# Patient Record
Sex: FEMALE | Race: WHITE | NOT HISPANIC OR LATINO | Employment: FULL TIME | URBAN - NONMETROPOLITAN AREA
[De-identification: names, ages, dates, MRNs, and addresses within clinical notes are randomized per-mention and may not be internally consistent; named-entity substitution may affect disease eponyms.]

---

## 2017-05-13 ENCOUNTER — APPOINTMENT (OUTPATIENT)
Dept: GENERAL RADIOLOGY | Facility: HOSPITAL | Age: 42
End: 2017-05-13

## 2017-05-13 ENCOUNTER — HOSPITAL ENCOUNTER (EMERGENCY)
Facility: HOSPITAL | Age: 42
Discharge: HOME | End: 2017-05-13
Attending: EMERGENCY MEDICINE | Admitting: EMERGENCY MEDICINE

## 2017-05-13 VITALS
HEIGHT: 66 IN | HEART RATE: 88 BPM | SYSTOLIC BLOOD PRESSURE: 129 MMHG | BODY MASS INDEX: 43.39 KG/M2 | RESPIRATION RATE: 16 BRPM | TEMPERATURE: 98.1 F | OXYGEN SATURATION: 99 % | DIASTOLIC BLOOD PRESSURE: 99 MMHG | WEIGHT: 270 LBS

## 2017-05-13 DIAGNOSIS — S16.1XXA CERVICAL STRAIN, INITIAL ENCOUNTER: Primary | ICD-10-CM

## 2017-05-13 DIAGNOSIS — S39.012A LUMBAR STRAIN, INITIAL ENCOUNTER: ICD-10-CM
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
## 2017-05-13 LAB — B-HCG UR QL: NEGATIVE

## 2017-05-13 PROCEDURE — 96372 THER/PROPH/DIAG INJ SC/IM: CPT

## 2017-05-13 PROCEDURE — 72050 X-RAY EXAM NECK SPINE 4/5VWS: CPT | Performed by: RADIOLOGY

## 2017-05-13 PROCEDURE — 72110 X-RAY EXAM L-2 SPINE 4/>VWS: CPT

## 2017-05-13 PROCEDURE — 99283 EMERGENCY DEPT VISIT LOW MDM: CPT

## 2017-05-13 PROCEDURE — 72110 X-RAY EXAM L-2 SPINE 4/>VWS: CPT | Performed by: RADIOLOGY

## 2017-05-13 PROCEDURE — 25010000002 HYDROMORPHONE PER 4 MG: Performed by: EMERGENCY MEDICINE

## 2017-05-13 PROCEDURE — 72050 X-RAY EXAM NECK SPINE 4/5VWS: CPT

## 2017-05-13 PROCEDURE — 81025 URINE PREGNANCY TEST: CPT | Performed by: PHYSICIAN ASSISTANT

## 2017-05-13 PROCEDURE — 25010000002 METHYLPREDNISOLONE PER 125 MG: Performed by: EMERGENCY MEDICINE

## 2017-05-13 RX ORDER — HYDROCODONE BITARTRATE AND ACETAMINOPHEN 7.5; 325 MG/1; MG/1
1 TABLET ORAL ONCE
Status: COMPLETED | OUTPATIENT
Start: 2017-05-13 | End: 2017-05-13

## 2017-05-13 RX ORDER — CYCLOBENZAPRINE HCL 10 MG
10 TABLET ORAL 3 TIMES DAILY PRN
Qty: 20 TABLET | Refills: 0 | OUTPATIENT
Start: 2017-05-13 | End: 2022-09-01

## 2017-05-13 RX ORDER — CYCLOBENZAPRINE HCL 10 MG
10 TABLET ORAL ONCE
Status: COMPLETED | OUTPATIENT
Start: 2017-05-13 | End: 2017-05-13

## 2017-05-13 RX ORDER — METHYLPREDNISOLONE SODIUM SUCCINATE 125 MG/2ML
125 INJECTION, POWDER, LYOPHILIZED, FOR SOLUTION INTRAMUSCULAR; INTRAVENOUS ONCE
Status: COMPLETED | OUTPATIENT
Start: 2017-05-13 | End: 2017-05-13

## 2017-05-13 RX ORDER — ONDANSETRON 4 MG/1
4 TABLET, ORALLY DISINTEGRATING ORAL ONCE
Status: COMPLETED | OUTPATIENT
Start: 2017-05-13 | End: 2017-05-13

## 2017-05-13 RX ADMIN — CYCLOBENZAPRINE HYDROCHLORIDE 10 MG: 10 TABLET, FILM COATED ORAL at 10:42

## 2017-05-13 RX ADMIN — HYDROCODONE BITARTRATE AND ACETAMINOPHEN 1 TABLET: 7.5; 325 TABLET ORAL at 10:42

## 2017-05-13 RX ADMIN — HYDROMORPHONE HYDROCHLORIDE 1 MG: 1 INJECTION, SOLUTION INTRAMUSCULAR; INTRAVENOUS; SUBCUTANEOUS at 12:07

## 2017-05-13 RX ADMIN — METHYLPREDNISOLONE SODIUM SUCCINATE 125 MG: 125 INJECTION, POWDER, FOR SOLUTION INTRAMUSCULAR; INTRAVENOUS at 10:41

## 2017-05-13 RX ADMIN — ONDANSETRON 4 MG: 4 TABLET, ORALLY DISINTEGRATING ORAL at 12:07

## 2017-06-19 ENCOUNTER — APPOINTMENT (OUTPATIENT)
Dept: GENERAL RADIOLOGY | Facility: HOSPITAL | Age: 42
End: 2017-06-19

## 2017-06-19 ENCOUNTER — HOSPITAL ENCOUNTER (EMERGENCY)
Facility: HOSPITAL | Age: 42
Discharge: HOME | End: 2017-06-19
Attending: EMERGENCY MEDICINE | Admitting: EMERGENCY MEDICINE

## 2017-06-19 VITALS
TEMPERATURE: 98.9 F | RESPIRATION RATE: 16 BRPM | DIASTOLIC BLOOD PRESSURE: 79 MMHG | OXYGEN SATURATION: 96 % | BODY MASS INDEX: 41.78 KG/M2 | SYSTOLIC BLOOD PRESSURE: 139 MMHG | HEIGHT: 66 IN | HEART RATE: 95 BPM | WEIGHT: 260 LBS

## 2017-06-19 DIAGNOSIS — S50.01XA CONTUSION OF RIGHT ELBOW, INITIAL ENCOUNTER: Primary | ICD-10-CM

## 2017-06-19 DIAGNOSIS — S30.1XXA HIP POINTER, INITIAL ENCOUNTER: ICD-10-CM

## 2017-06-19 LAB
B-HCG UR QL: NEGATIVE
BACTERIA UR QL AUTO: ABNORMAL /HPF
BILIRUB UR QL STRIP: NEGATIVE
CLARITY UR: ABNORMAL
COLOR UR: YELLOW
GLUCOSE UR STRIP-MCNC: NEGATIVE MG/DL
HGB UR QL STRIP.AUTO: ABNORMAL
HYALINE CASTS UR QL AUTO: ABNORMAL /LPF
KETONES UR QL STRIP: NEGATIVE
LEUKOCYTE ESTERASE UR QL STRIP.AUTO: NEGATIVE
NITRITE UR QL STRIP: NEGATIVE
PH UR STRIP.AUTO: 8 [PH] (ref 5–8)
PROT UR QL STRIP: ABNORMAL
RBC # UR: ABNORMAL /HPF
REF LAB TEST METHOD: ABNORMAL
SP GR UR STRIP: 1.01 (ref 1–1.03)
SQUAMOUS #/AREA URNS HPF: ABNORMAL /HPF
UROBILINOGEN UR QL STRIP: ABNORMAL
WBC UR QL AUTO: ABNORMAL /HPF

## 2017-06-19 PROCEDURE — 25010000002 KETOROLAC TROMETHAMINE PER 15 MG: Performed by: PHYSICIAN ASSISTANT

## 2017-06-19 PROCEDURE — 73502 X-RAY EXAM HIP UNI 2-3 VIEWS: CPT | Performed by: RADIOLOGY

## 2017-06-19 PROCEDURE — 81025 URINE PREGNANCY TEST: CPT | Performed by: EMERGENCY MEDICINE

## 2017-06-19 PROCEDURE — 96372 THER/PROPH/DIAG INJ SC/IM: CPT

## 2017-06-19 PROCEDURE — 73502 X-RAY EXAM HIP UNI 2-3 VIEWS: CPT

## 2017-06-19 PROCEDURE — 73080 X-RAY EXAM OF ELBOW: CPT | Performed by: RADIOLOGY

## 2017-06-19 PROCEDURE — 81001 URINALYSIS AUTO W/SCOPE: CPT | Performed by: EMERGENCY MEDICINE

## 2017-06-19 PROCEDURE — 99284 EMERGENCY DEPT VISIT MOD MDM: CPT

## 2017-06-19 PROCEDURE — 73080 X-RAY EXAM OF ELBOW: CPT

## 2017-06-19 RX ORDER — HYDROCODONE BITARTRATE AND ACETAMINOPHEN 7.5; 325 MG/1; MG/1
1 TABLET ORAL ONCE
Status: COMPLETED | OUTPATIENT
Start: 2017-06-19 | End: 2017-06-19

## 2017-06-19 RX ORDER — IBUPROFEN 800 MG/1
800 TABLET ORAL EVERY 8 HOURS PRN
Qty: 30 TABLET | Refills: 0 | Status: SHIPPED | OUTPATIENT
Start: 2017-06-19 | End: 2017-06-19 | Stop reason: HOSPADM

## 2017-06-19 RX ORDER — KETOROLAC TROMETHAMINE 30 MG/ML
60 INJECTION, SOLUTION INTRAMUSCULAR; INTRAVENOUS ONCE
Status: COMPLETED | OUTPATIENT
Start: 2017-06-19 | End: 2017-06-19

## 2017-06-19 RX ORDER — LISINOPRIL 2.5 MG/1
2.5 TABLET ORAL DAILY
COMMUNITY

## 2017-06-19 RX ADMIN — HYDROCODONE BITARTRATE AND ACETAMINOPHEN 1 TABLET: 7.5; 325 TABLET ORAL at 19:01

## 2017-06-19 RX ADMIN — KETOROLAC TROMETHAMINE 60 MG: 60 INJECTION, SOLUTION INTRAMUSCULAR at 20:30

## 2017-06-20 NOTE — ED PROVIDER NOTES
Subjective   Patient is a 41 y.o. female presenting with upper extremity pain and lower extremity pain.   History provided by:  Patient  Upper Extremity Issue   Location:  Elbow  Elbow location:  R elbow  Injury: yes    Time since incident:  1 hour  Mechanism of injury: fall    Fall:     Fall occurred:  Down stairs    Impact surface:  Hard floor    Entrapped after fall: no    Pain details:     Quality:  Aching and throbbing    Radiates to:  Does not radiate    Severity:  Moderate    Onset quality:  Sudden    Timing:  Constant    Progression:  Worsening  Dislocation: no    Relieved by:  Nothing  Ineffective treatments:  None tried  Associated symptoms: decreased range of motion and swelling    Associated symptoms: no fever    Lower Extremity Issue   Location:  Hip  Time since incident:  1 hour  Injury: yes    Mechanism of injury: fall    Fall:     Fall occurred:  Down stairs    Impact surface:  Hard floor    Entrapped after fall: no    Hip location:  R hip  Pain details:     Quality:  Aching and throbbing    Severity:  Moderate    Onset quality:  Sudden    Timing:  Constant    Progression:  Worsening  Relieved by:  Nothing  Ineffective treatments:  None tried  Associated symptoms: decreased ROM and swelling    Associated symptoms: no fever        Review of Systems   Constitutional: Negative.  Negative for fever.   HENT: Negative.    Respiratory: Negative.    Cardiovascular: Negative.  Negative for chest pain.   Gastrointestinal: Negative.  Negative for abdominal pain.   Endocrine: Negative.    Genitourinary: Negative.  Negative for dysuria.   Skin: Negative.    Neurological: Negative.    Psychiatric/Behavioral: Negative.    All other systems reviewed and are negative.      Past Medical History:   Diagnosis Date   • Hypertension        Allergies   Allergen Reactions   • Morphine And Related        History reviewed. No pertinent surgical history.    Family History   Problem Relation Age of Onset   • Family history  unknown: Yes       Social History     Social History   • Marital status: Single     Spouse name: N/A   • Number of children: N/A   • Years of education: N/A     Social History Main Topics   • Smoking status: Never Smoker   • Smokeless tobacco: None   • Alcohol use No   • Drug use: No   • Sexual activity: Defer     Other Topics Concern   • None     Social History Narrative   • None           Objective   Physical Exam   Constitutional: She is oriented to person, place, and time. She appears well-developed and well-nourished. No distress.   HENT:   Head: Normocephalic and atraumatic.   Nose: Nose normal.   Eyes: Conjunctivae and EOM are normal. Pupils are equal, round, and reactive to light.   Neck: Normal range of motion. Neck supple. No JVD present. No tracheal deviation present.   Cardiovascular: Normal rate, regular rhythm and normal heart sounds.    No murmur heard.  Pulmonary/Chest: Effort normal and breath sounds normal. No respiratory distress. She has no wheezes.   Abdominal: Soft. Bowel sounds are normal. There is no tenderness.   Musculoskeletal: She exhibits edema and tenderness. She exhibits no deformity.   Not able to fully extend right upper extremity.  Swelling noted.  Abrasions noted to RUE.  Tenderness to palpation of lateral right hip.    Neurological: She is alert and oriented to person, place, and time. No cranial nerve deficit.   Skin: Skin is warm and dry. No rash noted. She is not diaphoretic. No erythema. No pallor.   Abrasions noted to RUE.   Psychiatric: She has a normal mood and affect. Her behavior is normal. Thought content normal.   Nursing note and vitals reviewed.      Procedures         ED Course  ED Course   Comment By Time   XR elbow reviewed by Dr. Juárez:  Negative for acute bony abnormality.  Significant hematoma noted. TATUM Cooper 06/19 2050   XR right hip reviewed by Dr. Juárez:   No apparent bony abnormality. TATUM Cooper 06/19 2051                  [facility]  Number  of Diagnoses or Management Options  Contusion of right elbow, initial encounter: new and requires workup  Hip pointer, initial encounter: new and requires workup     Amount and/or Complexity of Data Reviewed  Clinical lab tests: ordered and reviewed  Tests in the radiology section of CPT®: ordered and reviewed    Risk of Complications, Morbidity, and/or Mortality  Presenting problems: low  Diagnostic procedures: low  Management options: low    Patient Progress  Patient progress: stable      Final diagnoses:   Contusion of right elbow, initial encounter   Hip pointer, initial encounter            TATUM Cooper  06/19/17 [id number]

## 2022-02-16 ENCOUNTER — APPOINTMENT (OUTPATIENT)
Dept: MAMMOGRAPHY | Facility: HOSPITAL | Age: 47
End: 2022-02-16

## 2022-08-30 ENCOUNTER — HOSPITAL ENCOUNTER (OUTPATIENT)
Dept: ULTRASOUND IMAGING | Facility: HOSPITAL | Age: 47
Discharge: HOME | End: 2022-08-30

## 2022-08-30 ENCOUNTER — HOSPITAL ENCOUNTER (OUTPATIENT)
Dept: MAMMOGRAPHY | Facility: HOSPITAL | Age: 47
Discharge: HOME | End: 2022-08-30

## 2022-08-30 DIAGNOSIS — N63.0 BREAST NODULE: ICD-10-CM

## 2022-08-30 PROCEDURE — 76642 ULTRASOUND BREAST LIMITED: CPT

## 2022-08-30 PROCEDURE — 77066 DX MAMMO INCL CAD BI: CPT

## 2022-08-30 PROCEDURE — G0279 TOMOSYNTHESIS, MAMMO: HCPCS

## 2022-08-30 PROCEDURE — 76642 ULTRASOUND BREAST LIMITED: CPT | Performed by: RADIOLOGY

## 2022-08-30 PROCEDURE — 77062 BREAST TOMOSYNTHESIS BI: CPT | Performed by: RADIOLOGY

## 2022-08-30 PROCEDURE — 77066 DX MAMMO INCL CAD BI: CPT | Performed by: RADIOLOGY

## 2022-09-07 ENCOUNTER — APPOINTMENT (OUTPATIENT)
Dept: ULTRASOUND IMAGING | Facility: HOSPITAL | Age: 47
End: 2022-09-07

## 2022-09-07 ENCOUNTER — APPOINTMENT (OUTPATIENT)
Dept: MAMMOGRAPHY | Facility: HOSPITAL | Age: 47
End: 2022-09-07

## 2022-09-15 ENCOUNTER — APPOINTMENT (OUTPATIENT)
Dept: MAMMOGRAPHY | Facility: HOSPITAL | Age: 47
End: 2022-09-15

## 2022-12-20 ENCOUNTER — HOSPITAL ENCOUNTER (OUTPATIENT)
Dept: ULTRASOUND IMAGING | Facility: HOSPITAL | Age: 47
Discharge: HOME | End: 2022-12-20
Admitting: RADIOLOGY

## 2022-12-20 DIAGNOSIS — R92.8 ABNORMAL MAMMOGRAM: ICD-10-CM

## 2022-12-20 PROCEDURE — 76642 ULTRASOUND BREAST LIMITED: CPT

## 2022-12-20 PROCEDURE — 76642 ULTRASOUND BREAST LIMITED: CPT | Performed by: RADIOLOGY

## 2024-10-17 ENCOUNTER — OFFICE VISIT (OUTPATIENT)
Dept: FAMILY MEDICINE CLINIC | Age: 49
End: 2024-10-17
Payer: COMMERCIAL

## 2024-10-17 VITALS
RESPIRATION RATE: 18 BRPM | HEART RATE: 83 BPM | WEIGHT: 281 LBS | HEIGHT: 66 IN | DIASTOLIC BLOOD PRESSURE: 84 MMHG | SYSTOLIC BLOOD PRESSURE: 150 MMHG | BODY MASS INDEX: 45.16 KG/M2

## 2024-10-17 DIAGNOSIS — E03.8 OTHER SPECIFIED HYPOTHYROIDISM: ICD-10-CM

## 2024-10-17 DIAGNOSIS — E66.01 MORBID OBESITY WITH BODY MASS INDEX (BMI) OF 45.0 TO 49.9 IN ADULT: Primary | ICD-10-CM

## 2024-10-17 DIAGNOSIS — I10 PRIMARY HYPERTENSION: ICD-10-CM

## 2024-10-17 DIAGNOSIS — R53.83 FATIGUE, UNSPECIFIED TYPE: ICD-10-CM

## 2024-10-17 DIAGNOSIS — E55.9 VITAMIN D DEFICIENCY: ICD-10-CM

## 2024-10-17 PROCEDURE — 84480 ASSAY TRIIODOTHYRONINE (T3): CPT | Performed by: NURSE PRACTITIONER

## 2024-10-17 PROCEDURE — 84439 ASSAY OF FREE THYROXINE: CPT | Performed by: NURSE PRACTITIONER

## 2024-10-17 PROCEDURE — 83036 HEMOGLOBIN GLYCOSYLATED A1C: CPT | Performed by: NURSE PRACTITIONER

## 2024-10-17 PROCEDURE — 80050 GENERAL HEALTH PANEL: CPT | Performed by: NURSE PRACTITIONER

## 2024-10-17 PROCEDURE — 99204 OFFICE O/P NEW MOD 45 MIN: CPT | Performed by: NURSE PRACTITIONER

## 2024-10-17 PROCEDURE — 86140 C-REACTIVE PROTEIN: CPT | Performed by: NURSE PRACTITIONER

## 2024-10-17 PROCEDURE — 82306 VITAMIN D 25 HYDROXY: CPT | Performed by: NURSE PRACTITIONER

## 2024-10-17 RX ORDER — CELECOXIB 100 MG/1
100 CAPSULE ORAL DAILY
COMMUNITY

## 2024-10-17 RX ORDER — ERGOCALCIFEROL 1.25 MG/1
50000 CAPSULE, LIQUID FILLED ORAL WEEKLY
COMMUNITY

## 2024-10-17 RX ORDER — VALSARTAN 160 MG/1
160 TABLET ORAL DAILY
COMMUNITY

## 2024-10-17 RX ORDER — LEVOTHYROXINE SODIUM 50 UG/1
50 TABLET ORAL DAILY
COMMUNITY

## 2024-10-17 RX ORDER — METHOTREXATE 2.5 MG/1
2.5 TABLET ORAL WEEKLY
COMMUNITY

## 2024-10-17 NOTE — PROGRESS NOTES
Chief Complaint  Obesity    Subjective            Obesity  Associated symptoms include arthralgias.     Barbara Phillips is a 49 y.o. female who presents to [facility] today with interest in the weight loss program with a BMI of Body mass index is 45.35 kg/m².. Patient is a candidate for this program due to BMI of Obese Class III extreme obesity: > or equal to 40kg/m2. Patient denies a personal history of pancreatitis. Patient denies a personal history of gastroparesis. Patient also denies a personal history of gallbladder disease.  Patient denies personal and  family history of medullary thyroid cancer and multiple endocrine neoplasia syndrome Type II. Previous diet and exercise plans attempted include Weight Watchers and also taking Adipex. Pt states she lost only a few pounds. She does have a history of weight gain over the past 20 years of 50-80 lbs. She states that she has difficulty adding exercise to her daily routine due to joint pain and recently started on Methorexate for rheumatoid arthritis. Pt did try Ozempic for approximately 6 months and lost 30 lbs before it was no longer covered. She states that she did tolerate the medication well during that time. Patient states that she has since regained the weight that she lost.     Review of Systems   Constitutional:         Weight gain   HENT: Negative.     Eyes: Negative.    Respiratory: Negative.     Cardiovascular: Negative.    Gastrointestinal: Negative.    Endocrine: Negative.    Genitourinary: Negative.    Musculoskeletal:  Positive for arthralgias.   Skin: Negative.    Allergic/Immunologic: Negative.    Neurological: Negative.    Hematological: Negative.    Psychiatric/Behavioral: Negative.          Past Medical History:   Diagnosis Date    Hypertension     Hypothyroidism     Rheumatoid arthritis      Past Surgical History:   Procedure Laterality Date    APPENDECTOMY       SECTION      CHOLECYSTECTOMY       "TONSILLECTOMY        Family History   Problem Relation Age of Onset    Breast cancer Neg Hx       Social History     Socioeconomic History    Marital status: Single   Substance and Sexual Activity    Alcohol use: No    Sexual activity: Yes      Allergies   Allergen Reactions    Morphine And Codeine       Current Outpatient Medications on File Prior to Visit   Medication Sig Dispense Refill    celecoxib (CeleBREX) 100 MG capsule Take 1 capsule by mouth Daily.      levothyroxine (SYNTHROID, LEVOTHROID) 50 MCG tablet Take 1 tablet by mouth Daily.      methotrexate 2.5 MG tablet Take 1 tablet by mouth 1 (One) Time Per Week.      valsartan (DIOVAN) 160 MG tablet Take 1 tablet by mouth Daily.      vitamin D (ERGOCALCIFEROL) 1.25 MG (23918 UT) capsule capsule Take 1 capsule by mouth 1 (One) Time Per Week.      [DISCONTINUED] lisinopril (PRINIVIL,ZESTRIL) 2.5 MG tablet Take 2.5 mg by mouth Daily. (Patient not taking: Reported on 10/17/2024)       No current facility-administered medications on file prior to visit.        The following portions of the patient's history were reviewed and updated as appropriate: allergies, current medications, past family history, past social history, past medical history, past surgical history and problem list.     Objective   Vital Signs:  Vitals:    10/17/24 1326   BP: 150/84   Pulse: 83   Resp: 18   Weight: 127 kg (281 lb)   Height: 167.6 cm (66\")      Estimated body mass index is 45.35 kg/m² as calculated from the following:    Height as of this encounter: 167.6 cm (66\").    Weight as of this encounter: 127 kg (281 lb).       Class 3 Severe Obesity (BMI >=40). Obesity-related health conditions include the following: hypertension, dyslipidemias, and osteoarthritis. Obesity is newly identified. BMI is is above average; BMI management plan is completed. We discussed portion control, increasing exercise, and pharmacologic options including Wegovy and Zepbound .      Physical " Exam  Constitutional:       Appearance: She is obese.   HENT:      Head: Normocephalic.   Eyes:      Pupils: Pupils are equal, round, and reactive to light.   Cardiovascular:      Rate and Rhythm: Normal rate and regular rhythm.   Pulmonary:      Effort: Pulmonary effort is normal.      Breath sounds: Normal breath sounds.   Abdominal:      Palpations: Abdomen is soft.   Musculoskeletal:         General: Normal range of motion.      Cervical back: Normal range of motion.   Neurological:      Mental Status: She is alert.   Psychiatric:         Mood and Affect: Mood normal.          No visits with results within 3 Month(s) from this visit.   Latest known visit with results is:   Admission on 06/19/2017, Discharged on 06/19/2017   Component Date Value Ref Range Status    Color, UA 06/19/2017 Yellow  Yellow, Straw Final    Appearance, UA 06/19/2017 Turbid (A)  Clear Final    pH, UA 06/19/2017 8.0  5.0 - 8.0 Final    Specific Gravity, UA 06/19/2017 1.014  1.005 - 1.030 Final    Glucose, UA 06/19/2017 Negative  Negative Final    Ketones, UA 06/19/2017 Negative  Negative Final    Bilirubin, UA 06/19/2017 Negative  Negative Final    Blood, UA 06/19/2017 Trace (A)  Negative Final    Protein, UA 06/19/2017 100 mg/dL (2+) (A)  Negative Final    Leuk Esterase, UA 06/19/2017 Negative  Negative Final    Nitrite, UA 06/19/2017 Negative  Negative Final    Urobilinogen, UA 06/19/2017 0.2 E.U./dL  0.2 - 1.0 E.U./dL Final    HCG, Urine QL 06/19/2017 Negative  Negative Final    RBC, UA 06/19/2017 3-5 (A)  None Seen /HPF Final    WBC, UA 06/19/2017 0-2 (A)  None Seen /HPF Final    Bacteria, UA 06/19/2017 None Seen  None Seen /HPF Final    Squamous Epithelial Cells, UA 06/19/2017 0-2  None Seen, 0-2 /HPF Final    Hyaline Casts, UA 06/19/2017 None Seen  None Seen /LPF Final    Methodology 06/19/2017 Automated Microscopy   Final        Result Review :           Assessment and Plan     Diagnoses and all orders for this visit:    1. Morbid  obesity with body mass index (BMI) of 45.0 to 49.9 in adult (Primary)  -     CBC (No Diff); Future  -     Comprehensive Metabolic Panel; Future  -     C-reactive Protein  -     Hemoglobin A1c  -     T3  -     T4, Free  -     TSH  -     Vitamin D,25-Hydroxy; Future    2. Fatigue, unspecified type  -     CBC (No Diff); Future  -     T3  -     T4, Free  -     TSH    3. Vitamin D deficiency  -     Vitamin D,25-Hydroxy; Future    4. Primary hypertension  -     CBC (No Diff); Future  -     Comprehensive Metabolic Panel; Future  -     C-reactive Protein  -     T3  -     T4, Free  -     TSH    5. Other specified hypothyroidism  -     T3  -     T4, Free  -     TSH           ICD-10-CM ICD-9-CM   1. Morbid obesity with body mass index (BMI) of 45.0 to 49.9 in adult  E66.01 278.01    Z68.42 V85.42   2. Fatigue, unspecified type  R53.83 780.79   3. Vitamin D deficiency  E55.9 268.9   4. Primary hypertension  I10 401.9   5. Other specified hypothyroidism  E03.8 244.8                Patient Education:     Patient is a candidate for the program with a BMI of Body mass index is 45.35 kg/m².. Obesity related health conditions include: BMI is above average. BMI management plan is completed. We reviewed portion control, increasing exercise and pharmacologic options. Details regarding the process of the program discussed with patient. Patient voiced understanding. Outpatient labs ordered for baseline  Patient had no further questions or concerns.     I explained that obesity and an elevated BMI can be a disease of body weight dysregulation influenced by factors including environment, genetics and hormones and intiated the discussion of semaglutides in appetite dysregulation and metabolic adaptation. We discussed possible side effects and complications of GLP1s including but not limited to nausea, vomiting, abdominal pain, bloating, constipation, diarrhea, slowed digestion, heartburn and gallstones or gallbladder disease. Reviewed that  side effects are mild to moderate and dose dependent and typically subside in 4-6 weeks. Will await lab results and if appropriate, refer to [facility] pharmacists.    Health Plan to Include:    Eat small portions  Stop eating before full   Avoid fried/greasy foods  Stay well hydrated        Follow Up   Return in about 3 months (around 1/17/2025) for Recheck.  Patient was given instructions and counseling regarding her condition or for health maintenance advice. Please see specific information pulled into the AVS if appropriate.         This document has been electronically signed by MINDI Cain  October 17, 2024 14:12 EDT

## 2024-10-18 LAB
25(OH)D3 SERPL-MCNC: 18.3 NG/ML (ref 30–100)
ALBUMIN SERPL-MCNC: 4.4 G/DL (ref 3.5–5.2)
ALBUMIN/GLOB SERPL: 1.2 G/DL
ALP SERPL-CCNC: 100 U/L (ref 39–117)
ALT SERPL W P-5'-P-CCNC: 5 U/L (ref 1–33)
ANION GAP SERPL CALCULATED.3IONS-SCNC: 12.2 MMOL/L (ref 5–15)
AST SERPL-CCNC: 14 U/L (ref 1–32)
BILIRUB SERPL-MCNC: 0.3 MG/DL (ref 0–1.2)
BUN SERPL-MCNC: 10 MG/DL (ref 6–20)
BUN/CREAT SERPL: 15.9 (ref 7–25)
CALCIUM SPEC-SCNC: 9.4 MG/DL (ref 8.6–10.5)
CHLORIDE SERPL-SCNC: 104 MMOL/L (ref 98–107)
CO2 SERPL-SCNC: 23.8 MMOL/L (ref 22–29)
CREAT SERPL-MCNC: 0.63 MG/DL (ref 0.57–1)
CRP SERPL-MCNC: 1.72 MG/DL (ref 0–0.5)
DEPRECATED RDW RBC AUTO: 45.9 FL (ref 37–54)
EGFRCR SERPLBLD CKD-EPI 2021: 108.9 ML/MIN/1.73
ERYTHROCYTE [DISTWIDTH] IN BLOOD BY AUTOMATED COUNT: 14.2 % (ref 12.3–15.4)
GLOBULIN UR ELPH-MCNC: 3.6 GM/DL
GLUCOSE SERPL-MCNC: 97 MG/DL (ref 65–99)
HBA1C MFR BLD: 6 % (ref 4.8–5.6)
HCT VFR BLD AUTO: 38.9 % (ref 34–46.6)
HGB BLD-MCNC: 12.8 G/DL (ref 12–15.9)
MCH RBC QN AUTO: 29.5 PG (ref 26.6–33)
MCHC RBC AUTO-ENTMCNC: 32.9 G/DL (ref 31.5–35.7)
MCV RBC AUTO: 89.6 FL (ref 79–97)
PLATELET # BLD AUTO: 475 10*3/MM3 (ref 140–450)
PMV BLD AUTO: 9.4 FL (ref 6–12)
POTASSIUM SERPL-SCNC: 3.8 MMOL/L (ref 3.5–5.2)
PROT SERPL-MCNC: 8 G/DL (ref 6–8.5)
RBC # BLD AUTO: 4.34 10*6/MM3 (ref 3.77–5.28)
SODIUM SERPL-SCNC: 140 MMOL/L (ref 136–145)
T3 SERPL-MCNC: 122 NG/DL (ref 80–200)
T4 FREE SERPL-MCNC: 0.87 NG/DL (ref 0.92–1.68)
TSH SERPL DL<=0.05 MIU/L-ACNC: 6.37 UIU/ML (ref 0.27–4.2)
WBC NRBC COR # BLD AUTO: 10.85 10*3/MM3 (ref 3.4–10.8)

## 2024-10-22 ENCOUNTER — TELEPHONE (OUTPATIENT)
Dept: FAMILY MEDICINE CLINIC | Age: 49
End: 2024-10-22
Payer: COMMERCIAL

## 2024-10-22 DIAGNOSIS — E66.01 MORBID OBESITY WITH BODY MASS INDEX (BMI) OF 45.0 TO 49.9 IN ADULT: Primary | ICD-10-CM

## 2024-10-22 DIAGNOSIS — E55.9 VITAMIN D DEFICIENCY: ICD-10-CM

## 2024-10-22 DIAGNOSIS — R73.03 PREDIABETES: ICD-10-CM

## 2024-10-22 DIAGNOSIS — E03.8 OTHER SPECIFIED HYPOTHYROIDISM: Primary | ICD-10-CM

## 2024-10-22 RX ORDER — LEVOTHYROXINE SODIUM 75 UG/1
75 TABLET ORAL
Qty: 30 TABLET | Refills: 2 | Status: SHIPPED | OUTPATIENT
Start: 2024-10-22 | End: 2024-11-21

## 2024-10-22 NOTE — TELEPHONE ENCOUNTER
Called patient and reviewed lab results. Pt will take Vitamin D OTC and aware that we will increase Levothyroxine to 75mcg q am. Order sent to [facility]. Will do follow up labs in 3 months if not repeated by PCP prior. Referral sent to [facility] for weight mgmt as well.

## 2024-10-29 ENCOUNTER — TELEPHONE (OUTPATIENT)
Dept: PHARMACY | Facility: HOSPITAL | Age: 49
End: 2024-10-29

## 2024-10-29 NOTE — TELEPHONE ENCOUNTER
Referral received for weight management. Patient must enroll with [facility] weight  before she can start Wegovy. I gave patient the weight  number to register and she is aware she will need to call us back to schedule appointment.

## 2025-06-09 ENCOUNTER — HOSPITAL ENCOUNTER (INPATIENT)
Facility: HOSPITAL | Age: 50
LOS: 2 days | Discharge: HOME | DRG: 065 | End: 2025-06-13
Attending: EMERGENCY MEDICINE | Admitting: STUDENT IN AN ORGANIZED HEALTH CARE EDUCATION/TRAINING PROGRAM
Payer: COMMERCIAL

## 2025-06-09 ENCOUNTER — APPOINTMENT (OUTPATIENT)
Dept: MRI IMAGING | Facility: HOSPITAL | Age: 50
DRG: 065 | End: 2025-06-09
Payer: COMMERCIAL

## 2025-06-09 ENCOUNTER — APPOINTMENT (OUTPATIENT)
Dept: GENERAL RADIOLOGY | Facility: HOSPITAL | Age: 50
DRG: 065 | End: 2025-06-09
Payer: COMMERCIAL

## 2025-06-09 ENCOUNTER — APPOINTMENT (OUTPATIENT)
Dept: CT IMAGING | Facility: HOSPITAL | Age: 50
DRG: 065 | End: 2025-06-09
Payer: COMMERCIAL

## 2025-06-09 DIAGNOSIS — I16.0 HYPERTENSIVE URGENCY: Primary | ICD-10-CM

## 2025-06-09 DIAGNOSIS — R44.3 HALLUCINATION: ICD-10-CM

## 2025-06-09 DIAGNOSIS — R25.1 TREMORS OF NERVOUS SYSTEM: ICD-10-CM

## 2025-06-09 DIAGNOSIS — R11.2 NAUSEA AND VOMITING, UNSPECIFIED VOMITING TYPE: ICD-10-CM

## 2025-06-09 PROBLEM — I10 HTN (HYPERTENSION): Status: ACTIVE | Noted: 2025-06-09

## 2025-06-09 PROBLEM — I16.9 HYPERTENSIVE CRISIS: Status: ACTIVE | Noted: 2025-06-09

## 2025-06-09 PROBLEM — M06.9 RHEUMATOID ARTHRITIS: Status: ACTIVE | Noted: 2025-06-09

## 2025-06-09 LAB
ALBUMIN SERPL-MCNC: 4.7 G/DL (ref 3.5–5.2)
ALBUMIN/GLOB SERPL: 1.2 G/DL
ALP SERPL-CCNC: 112 U/L (ref 39–117)
ALT SERPL W P-5'-P-CCNC: 11 U/L (ref 1–33)
AMPHET+METHAMPHET UR QL: NEGATIVE
AMPHETAMINES UR QL: NEGATIVE
ANION GAP SERPL CALCULATED.3IONS-SCNC: 20 MMOL/L (ref 5–15)
AST SERPL-CCNC: 23 U/L (ref 1–32)
B-HCG UR QL: NEGATIVE
BACTERIA UR QL AUTO: NORMAL /HPF
BARBITURATES UR QL SCN: NEGATIVE
BASOPHILS # BLD AUTO: 0.05 10*3/MM3 (ref 0–0.2)
BASOPHILS NFR BLD AUTO: 0.5 % (ref 0–1.5)
BENZODIAZ UR QL SCN: NEGATIVE
BILIRUB SERPL-MCNC: 0.4 MG/DL (ref 0–1.2)
BILIRUB UR QL STRIP: NEGATIVE
BUN SERPL-MCNC: 13.6 MG/DL (ref 6–20)
BUN/CREAT SERPL: 14 (ref 7–25)
BUPRENORPHINE SERPL-MCNC: NEGATIVE NG/ML
CA-I SERPL ISE-MCNC: 1.11 MMOL/L (ref 1.15–1.3)
CALCIUM SPEC-SCNC: 10 MG/DL (ref 8.6–10.5)
CANNABINOIDS SERPL QL: NEGATIVE
CHLORIDE SERPL-SCNC: 103 MMOL/L (ref 98–107)
CK SERPL-CCNC: 123 U/L (ref 20–180)
CLARITY UR: CLEAR
CO2 SERPL-SCNC: 18 MMOL/L (ref 22–29)
COCAINE UR QL: NEGATIVE
COLOR UR: YELLOW
CREAT SERPL-MCNC: 0.97 MG/DL (ref 0.57–1)
CRP SERPL-MCNC: 1.5 MG/DL (ref 0–0.5)
D-LACTATE SERPL-SCNC: 1.8 MMOL/L (ref 0.5–2)
D-LACTATE SERPL-SCNC: 3.2 MMOL/L (ref 0.5–2)
DEPRECATED RDW RBC AUTO: 39.8 FL (ref 37–54)
EGFRCR SERPLBLD CKD-EPI 2021: 71.8 ML/MIN/1.73
EOSINOPHIL # BLD AUTO: 0.27 10*3/MM3 (ref 0–0.4)
EOSINOPHIL NFR BLD AUTO: 2.6 % (ref 0.3–6.2)
ERYTHROCYTE [DISTWIDTH] IN BLOOD BY AUTOMATED COUNT: 13 % (ref 12.3–15.4)
ERYTHROCYTE [SEDIMENTATION RATE] IN BLOOD: 53 MM/HR (ref 0–20)
FENTANYL UR-MCNC: NEGATIVE NG/ML
GEN 5 1HR TROPONIN T REFLEX: 25 NG/L
GLOBULIN UR ELPH-MCNC: 3.8 GM/DL
GLUCOSE SERPL-MCNC: 153 MG/DL (ref 65–99)
GLUCOSE UR STRIP-MCNC: NEGATIVE MG/DL
HCG INTACT+B SERPL-ACNC: 1.37 MIU/ML
HCT VFR BLD AUTO: 41.1 % (ref 34–46.6)
HGB BLD-MCNC: 14.2 G/DL (ref 12–15.9)
HGB UR QL STRIP.AUTO: NEGATIVE
HOLD SPECIMEN: NORMAL
HYALINE CASTS UR QL AUTO: NORMAL /LPF
IMM GRANULOCYTES # BLD AUTO: 0.03 10*3/MM3 (ref 0–0.05)
IMM GRANULOCYTES NFR BLD AUTO: 0.3 % (ref 0–0.5)
KETONES UR QL STRIP: NEGATIVE
LEUKOCYTE ESTERASE UR QL STRIP.AUTO: NEGATIVE
LIPASE SERPL-CCNC: 13 U/L (ref 13–60)
LYMPHOCYTES # BLD AUTO: 2.13 10*3/MM3 (ref 0.7–3.1)
LYMPHOCYTES NFR BLD AUTO: 20.5 % (ref 19.6–45.3)
MAGNESIUM SERPL-MCNC: 1.4 MG/DL (ref 1.6–2.6)
MCH RBC QN AUTO: 29.6 PG (ref 26.6–33)
MCHC RBC AUTO-ENTMCNC: 34.5 G/DL (ref 31.5–35.7)
MCV RBC AUTO: 85.6 FL (ref 79–97)
METHADONE UR QL SCN: NEGATIVE
MONOCYTES # BLD AUTO: 0.71 10*3/MM3 (ref 0.1–0.9)
MONOCYTES NFR BLD AUTO: 6.8 % (ref 5–12)
NEUTROPHILS NFR BLD AUTO: 69.3 % (ref 42.7–76)
NEUTROPHILS NFR BLD AUTO: 7.21 10*3/MM3 (ref 1.7–7)
NITRITE UR QL STRIP: NEGATIVE
NRBC BLD AUTO-RTO: 0 /100 WBC (ref 0–0.2)
NT-PROBNP SERPL-MCNC: 1104 PG/ML (ref 0–450)
OPIATES UR QL: NEGATIVE
OXYCODONE UR QL SCN: NEGATIVE
PCP UR QL SCN: NEGATIVE
PH UR STRIP.AUTO: 5.5 [PH] (ref 5–8)
PLATELET # BLD AUTO: 497 10*3/MM3 (ref 140–450)
PMV BLD AUTO: 8.8 FL (ref 6–12)
POTASSIUM SERPL-SCNC: 4.5 MMOL/L (ref 3.5–5.2)
PROCALCITONIN SERPL-MCNC: 0.08 NG/ML (ref 0–0.25)
PROT SERPL-MCNC: 8.5 G/DL (ref 6–8.5)
PROT UR QL STRIP: ABNORMAL
RBC # BLD AUTO: 4.8 10*6/MM3 (ref 3.77–5.28)
RBC # UR STRIP: NORMAL /HPF
REF LAB TEST METHOD: NORMAL
SODIUM SERPL-SCNC: 141 MMOL/L (ref 136–145)
SP GR UR STRIP: 1.01 (ref 1–1.03)
SQUAMOUS #/AREA URNS HPF: NORMAL /HPF
T4 FREE SERPL-MCNC: 0.94 NG/DL (ref 0.92–1.68)
TRICYCLICS UR QL SCN: NEGATIVE
TROPONIN T % DELTA: -14
TROPONIN T NUMERIC DELTA: -4 NG/L
TROPONIN T SERPL HS-MCNC: 29 NG/L
TSH SERPL DL<=0.05 MIU/L-ACNC: 9.89 UIU/ML (ref 0.27–4.2)
UROBILINOGEN UR QL STRIP: ABNORMAL
WBC # UR STRIP: NORMAL /HPF
WBC NRBC COR # BLD AUTO: 10.4 10*3/MM3 (ref 3.4–10.8)
WHOLE BLOOD HOLD COAG: NORMAL
WHOLE BLOOD HOLD SPECIMEN: NORMAL

## 2025-06-09 PROCEDURE — 25010000002 PROMETHAZINE PER 50 MG: Performed by: INTERNAL MEDICINE

## 2025-06-09 PROCEDURE — 83880 ASSAY OF NATRIURETIC PEPTIDE: CPT | Performed by: NURSE PRACTITIONER

## 2025-06-09 PROCEDURE — 83735 ASSAY OF MAGNESIUM: CPT | Performed by: NURSE PRACTITIONER

## 2025-06-09 PROCEDURE — 85652 RBC SED RATE AUTOMATED: CPT | Performed by: INTERNAL MEDICINE

## 2025-06-09 PROCEDURE — 87040 BLOOD CULTURE FOR BACTERIA: CPT | Performed by: EMERGENCY MEDICINE

## 2025-06-09 PROCEDURE — 81025 URINE PREGNANCY TEST: CPT | Performed by: EMERGENCY MEDICINE

## 2025-06-09 PROCEDURE — 99285 EMERGENCY DEPT VISIT HI MDM: CPT

## 2025-06-09 PROCEDURE — G0378 HOSPITAL OBSERVATION PER HR: HCPCS

## 2025-06-09 PROCEDURE — 82550 ASSAY OF CK (CPK): CPT | Performed by: NURSE PRACTITIONER

## 2025-06-09 PROCEDURE — 99222 1ST HOSP IP/OBS MODERATE 55: CPT | Performed by: INTERNAL MEDICINE

## 2025-06-09 PROCEDURE — 25010000002 NICARDIPINE 2.5 MG/ML SOLUTION 10 ML VIAL: Performed by: NURSE PRACTITIONER

## 2025-06-09 PROCEDURE — 36415 COLL VENOUS BLD VENIPUNCTURE: CPT

## 2025-06-09 PROCEDURE — 70450 CT HEAD/BRAIN W/O DYE: CPT

## 2025-06-09 PROCEDURE — 25010000002 ONDANSETRON PER 1 MG: Performed by: INTERNAL MEDICINE

## 2025-06-09 PROCEDURE — 25010000002 PROCHLORPERAZINE 10 MG/2ML SOLUTION: Performed by: INTERNAL MEDICINE

## 2025-06-09 PROCEDURE — 93005 ELECTROCARDIOGRAM TRACING: CPT | Performed by: NURSE PRACTITIONER

## 2025-06-09 PROCEDURE — 25510000001 IOPAMIDOL PER 1 ML: Performed by: EMERGENCY MEDICINE

## 2025-06-09 PROCEDURE — 82330 ASSAY OF CALCIUM: CPT | Performed by: NURSE PRACTITIONER

## 2025-06-09 PROCEDURE — 83690 ASSAY OF LIPASE: CPT | Performed by: EMERGENCY MEDICINE

## 2025-06-09 PROCEDURE — 25010000002 HYDROMORPHONE PER 4 MG: Performed by: INTERNAL MEDICINE

## 2025-06-09 PROCEDURE — 80050 GENERAL HEALTH PANEL: CPT | Performed by: EMERGENCY MEDICINE

## 2025-06-09 PROCEDURE — 83605 ASSAY OF LACTIC ACID: CPT | Performed by: EMERGENCY MEDICINE

## 2025-06-09 PROCEDURE — 84484 ASSAY OF TROPONIN QUANT: CPT | Performed by: NURSE PRACTITIONER

## 2025-06-09 PROCEDURE — 84439 ASSAY OF FREE THYROXINE: CPT | Performed by: NURSE PRACTITIONER

## 2025-06-09 PROCEDURE — 25010000002 MAGNESIUM SULFATE 2 GM/50ML SOLUTION: Performed by: NURSE PRACTITIONER

## 2025-06-09 PROCEDURE — 80307 DRUG TEST PRSMV CHEM ANLYZR: CPT | Performed by: INTERNAL MEDICINE

## 2025-06-09 PROCEDURE — 25010000002 ONDANSETRON PER 1 MG: Performed by: NURSE PRACTITIONER

## 2025-06-09 PROCEDURE — 81001 URINALYSIS AUTO W/SCOPE: CPT | Performed by: EMERGENCY MEDICINE

## 2025-06-09 PROCEDURE — 74177 CT ABD & PELVIS W/CONTRAST: CPT

## 2025-06-09 PROCEDURE — 25810000003 SEPSIS FLUID NS 0.9 % SOLUTION: Performed by: EMERGENCY MEDICINE

## 2025-06-09 PROCEDURE — 25810000003 SODIUM CHLORIDE 0.9 % SOLUTION: Performed by: INTERNAL MEDICINE

## 2025-06-09 PROCEDURE — 25810000003 SODIUM CHLORIDE 0.9 % SOLUTION: Performed by: NURSE PRACTITIONER

## 2025-06-09 PROCEDURE — 84702 CHORIONIC GONADOTROPIN TEST: CPT | Performed by: EMERGENCY MEDICINE

## 2025-06-09 PROCEDURE — 25810000003 SODIUM CHLORIDE 0.9 % SOLUTION 250 ML FLEX CONT: Performed by: NURSE PRACTITIONER

## 2025-06-09 PROCEDURE — 71275 CT ANGIOGRAPHY CHEST: CPT

## 2025-06-09 PROCEDURE — 86140 C-REACTIVE PROTEIN: CPT | Performed by: INTERNAL MEDICINE

## 2025-06-09 PROCEDURE — 84145 PROCALCITONIN (PCT): CPT | Performed by: EMERGENCY MEDICINE

## 2025-06-09 PROCEDURE — 25010000002 PROMETHAZINE PER 50 MG: Performed by: NURSE PRACTITIONER

## 2025-06-09 PROCEDURE — 70551 MRI BRAIN STEM W/O DYE: CPT

## 2025-06-09 PROCEDURE — 71045 X-RAY EXAM CHEST 1 VIEW: CPT

## 2025-06-09 RX ORDER — ONDANSETRON 2 MG/ML
4 INJECTION INTRAMUSCULAR; INTRAVENOUS EVERY 6 HOURS PRN
Status: DISCONTINUED | OUTPATIENT
Start: 2025-06-09 | End: 2025-06-11

## 2025-06-09 RX ORDER — IOPAMIDOL 755 MG/ML
100 INJECTION, SOLUTION INTRAVASCULAR
Status: COMPLETED | OUTPATIENT
Start: 2025-06-09 | End: 2025-06-09

## 2025-06-09 RX ORDER — ACETAMINOPHEN 500 MG
1000 TABLET ORAL ONCE
Status: COMPLETED | OUTPATIENT
Start: 2025-06-09 | End: 2025-06-09

## 2025-06-09 RX ORDER — SODIUM CHLORIDE 0.9 % (FLUSH) 0.9 %
10 SYRINGE (ML) INJECTION AS NEEDED
Status: DISCONTINUED | OUTPATIENT
Start: 2025-06-09 | End: 2025-06-13 | Stop reason: HOSPADM

## 2025-06-09 RX ORDER — VALSARTAN 160 MG/1
160 TABLET ORAL
Status: DISCONTINUED | OUTPATIENT
Start: 2025-06-09 | End: 2025-06-09

## 2025-06-09 RX ORDER — VALSARTAN 160 MG/1
160 TABLET ORAL DAILY
Status: DISCONTINUED | OUTPATIENT
Start: 2025-06-10 | End: 2025-06-11

## 2025-06-09 RX ORDER — LEVOTHYROXINE SODIUM 25 UG/1
75 TABLET ORAL
Status: DISCONTINUED | OUTPATIENT
Start: 2025-06-10 | End: 2025-06-13 | Stop reason: HOSPADM

## 2025-06-09 RX ORDER — SODIUM CHLORIDE 9 MG/ML
10 INJECTION, SOLUTION INTRAMUSCULAR; INTRAVENOUS; SUBCUTANEOUS AS NEEDED
Status: DISCONTINUED | OUTPATIENT
Start: 2025-06-09 | End: 2025-06-13 | Stop reason: HOSPADM

## 2025-06-09 RX ORDER — TEMAZEPAM 15 MG/1
15 CAPSULE ORAL NIGHTLY PRN
Status: DISCONTINUED | OUTPATIENT
Start: 2025-06-09 | End: 2025-06-13 | Stop reason: HOSPADM

## 2025-06-09 RX ORDER — PROCHLORPERAZINE EDISYLATE 5 MG/ML
5 INJECTION INTRAMUSCULAR; INTRAVENOUS EVERY 6 HOURS PRN
Status: DISCONTINUED | OUTPATIENT
Start: 2025-06-09 | End: 2025-06-13 | Stop reason: HOSPADM

## 2025-06-09 RX ORDER — ONDANSETRON 2 MG/ML
4 INJECTION INTRAMUSCULAR; INTRAVENOUS ONCE
Status: COMPLETED | OUTPATIENT
Start: 2025-06-09 | End: 2025-06-09

## 2025-06-09 RX ORDER — BISACODYL 5 MG/1
5 TABLET, DELAYED RELEASE ORAL DAILY PRN
Status: DISCONTINUED | OUTPATIENT
Start: 2025-06-09 | End: 2025-06-13 | Stop reason: HOSPADM

## 2025-06-09 RX ORDER — LEVOTHYROXINE SODIUM 75 UG/1
75 TABLET ORAL ONCE
Status: COMPLETED | OUTPATIENT
Start: 2025-06-09 | End: 2025-06-09

## 2025-06-09 RX ORDER — ACETAMINOPHEN 325 MG/1
650 TABLET ORAL EVERY 4 HOURS PRN
Status: DISCONTINUED | OUTPATIENT
Start: 2025-06-09 | End: 2025-06-11

## 2025-06-09 RX ORDER — ACETAMINOPHEN 650 MG/1
650 SUPPOSITORY RECTAL EVERY 4 HOURS PRN
Status: DISCONTINUED | OUTPATIENT
Start: 2025-06-09 | End: 2025-06-11

## 2025-06-09 RX ORDER — SODIUM CHLORIDE 0.9 % (FLUSH) 0.9 %
10 SYRINGE (ML) INJECTION AS NEEDED
Status: DISCONTINUED | OUTPATIENT
Start: 2025-06-09 | End: 2025-06-09

## 2025-06-09 RX ORDER — ACETAMINOPHEN 160 MG/5ML
650 SOLUTION ORAL EVERY 4 HOURS PRN
Status: DISCONTINUED | OUTPATIENT
Start: 2025-06-09 | End: 2025-06-11

## 2025-06-09 RX ORDER — AMOXICILLIN 250 MG
2 CAPSULE ORAL 2 TIMES DAILY PRN
Status: DISCONTINUED | OUTPATIENT
Start: 2025-06-09 | End: 2025-06-13 | Stop reason: HOSPADM

## 2025-06-09 RX ORDER — POLYETHYLENE GLYCOL 3350 17 G/17G
17 POWDER, FOR SOLUTION ORAL DAILY PRN
Status: DISCONTINUED | OUTPATIENT
Start: 2025-06-09 | End: 2025-06-13 | Stop reason: HOSPADM

## 2025-06-09 RX ORDER — SODIUM CHLORIDE 9 MG/ML
40 INJECTION, SOLUTION INTRAVENOUS AS NEEDED
Status: DISCONTINUED | OUTPATIENT
Start: 2025-06-09 | End: 2025-06-13 | Stop reason: HOSPADM

## 2025-06-09 RX ORDER — HYDROMORPHONE HYDROCHLORIDE 1 MG/ML
0.5 INJECTION, SOLUTION INTRAMUSCULAR; INTRAVENOUS; SUBCUTANEOUS ONCE
Status: COMPLETED | OUTPATIENT
Start: 2025-06-09 | End: 2025-06-09

## 2025-06-09 RX ORDER — SODIUM CHLORIDE 9 MG/ML
100 INJECTION, SOLUTION INTRAVENOUS CONTINUOUS
Status: DISCONTINUED | OUTPATIENT
Start: 2025-06-09 | End: 2025-06-11

## 2025-06-09 RX ORDER — BISACODYL 10 MG
10 SUPPOSITORY, RECTAL RECTAL DAILY PRN
Status: DISCONTINUED | OUTPATIENT
Start: 2025-06-09 | End: 2025-06-11

## 2025-06-09 RX ORDER — MAGNESIUM SULFATE HEPTAHYDRATE 40 MG/ML
2 INJECTION, SOLUTION INTRAVENOUS ONCE
Status: COMPLETED | OUTPATIENT
Start: 2025-06-09 | End: 2025-06-09

## 2025-06-09 RX ORDER — SODIUM CHLORIDE 0.9 % (FLUSH) 0.9 %
10 SYRINGE (ML) INJECTION EVERY 12 HOURS SCHEDULED
Status: DISCONTINUED | OUTPATIENT
Start: 2025-06-09 | End: 2025-06-13 | Stop reason: HOSPADM

## 2025-06-09 RX ORDER — LABETALOL HYDROCHLORIDE 5 MG/ML
20 INJECTION, SOLUTION INTRAVENOUS ONCE
Status: COMPLETED | OUTPATIENT
Start: 2025-06-09 | End: 2025-06-09

## 2025-06-09 RX ADMIN — ONDANSETRON 4 MG: 2 INJECTION INTRAMUSCULAR; INTRAVENOUS at 08:21

## 2025-06-09 RX ADMIN — PROMETHAZINE HYDROCHLORIDE 12.5 MG: 25 INJECTION, SOLUTION INTRAMUSCULAR; INTRAVENOUS at 13:06

## 2025-06-09 RX ADMIN — ACETAMINOPHEN 650 MG: 325 TABLET ORAL at 21:45

## 2025-06-09 RX ADMIN — NICARDIPINE HYDROCHLORIDE 5 MG/HR: 25 INJECTION, SOLUTION INTRAVENOUS at 12:44

## 2025-06-09 RX ADMIN — VALSARTAN 160 MG: 160 TABLET, FILM COATED ORAL at 11:34

## 2025-06-09 RX ADMIN — MAGNESIUM SULFATE HEPTAHYDRATE 2 G: 40 INJECTION, SOLUTION INTRAVENOUS at 09:10

## 2025-06-09 RX ADMIN — PROCHLORPERAZINE EDISYLATE 5 MG: 5 INJECTION INTRAMUSCULAR; INTRAVENOUS at 15:05

## 2025-06-09 RX ADMIN — Medication 20 MG: at 08:21

## 2025-06-09 RX ADMIN — NICARDIPINE HYDROCHLORIDE 10 MG/HR: 25 INJECTION, SOLUTION INTRAVENOUS at 21:59

## 2025-06-09 RX ADMIN — HYDROMORPHONE HYDROCHLORIDE 0.5 MG: 1 INJECTION, SOLUTION INTRAMUSCULAR; INTRAVENOUS; SUBCUTANEOUS at 15:05

## 2025-06-09 RX ADMIN — Medication 10 ML: at 21:46

## 2025-06-09 RX ADMIN — ONDANSETRON 4 MG: 2 INJECTION INTRAMUSCULAR; INTRAVENOUS at 19:47

## 2025-06-09 RX ADMIN — SODIUM CHLORIDE 1000 ML: 9 INJECTION, SOLUTION INTRAVENOUS at 07:50

## 2025-06-09 RX ADMIN — LEVOTHYROXINE SODIUM 75 MCG: 0.07 TABLET ORAL at 13:03

## 2025-06-09 RX ADMIN — Medication 5 MG: at 21:45

## 2025-06-09 RX ADMIN — NICARDIPINE HYDROCHLORIDE 5 MG/HR: 25 INJECTION, SOLUTION INTRAVENOUS at 18:52

## 2025-06-09 RX ADMIN — NICARDIPINE HYDROCHLORIDE 10 MG/HR: 25 INJECTION, SOLUTION INTRAVENOUS at 16:32

## 2025-06-09 RX ADMIN — ACETAMINOPHEN 1000 MG: 500 TABLET ORAL at 11:34

## 2025-06-09 RX ADMIN — SODIUM CHLORIDE 100 ML/HR: 9 INJECTION, SOLUTION INTRAVENOUS at 15:08

## 2025-06-09 RX ADMIN — IOPAMIDOL 85 ML: 755 INJECTION, SOLUTION INTRAVENOUS at 11:10

## 2025-06-09 RX ADMIN — SODIUM CHLORIDE 2604 ML: 9 INJECTION, SOLUTION INTRAVENOUS at 08:24

## 2025-06-09 RX ADMIN — PROMETHAZINE HYDROCHLORIDE 12.5 MG: 25 INJECTION, SOLUTION INTRAMUSCULAR; INTRAVENOUS at 15:23

## 2025-06-09 NOTE — ED NOTES
Barbara Phillips    Nursing Report ED to Floor:  Mental status: A&O x 4   Ambulatory status: up with assist x 1  Oxygen Therapy:  room air   Cardiac Rhythm: sinus tachycardia  Admitted from: ED  Safety Concerns:  none   Precautions: none   Social Issues: none  ED Room #:  22    ED Nurse Phone Extension - [phone] or may call [phone].      HPI:   Chief Complaint   Patient presents with    Vomiting       Past Medical History:  Past Medical History:   Diagnosis Date    Hypertension     Hypothyroidism     Rheumatoid arthritis         Past Surgical History:  Past Surgical History:   Procedure Laterality Date    APPENDECTOMY       SECTION      CHOLECYSTECTOMY      TONSILLECTOMY          Admitting Doctor:   Tutu Gay DO    Consulting Provider(s):  Consults       No orders found from 2025 to 6/10/2025.             Admitting Diagnosis:   There were no encounter diagnoses.    Most Recent Vitals:   Vitals:    25 1000 25 1100 25 1200 25 1230   BP: (!) 183/92  (!) 215/106    BP Location:       Patient Position:       Pulse: 90 92 96 95   Resp:       Temp:       TempSrc:       SpO2: 95% 96% 96% 97%   Weight:       Height:           Active LDAs/IV Access:   Lines, Drains & Airways       Active LDAs       Name Placement date Placement time Site Days    Peripheral IV 25 0750 20 G Posterior;Right Hand 25  0750  Hand  less than 1    Peripheral IV 25 1043 20 G Right Antecubital 25  1043  Antecubital  less than 1                    Labs (abnormal labs have a star):   Labs Reviewed   COMPREHENSIVE METABOLIC PANEL - Abnormal; Notable for the following components:       Result Value    Glucose 153 (*)     CO2 18.0 (*)     Anion Gap 20.0 (*)     All other components within normal limits    Narrative:     GFR Categories in Chronic Kidney Disease (CKD)              GFR Category          GFR (mL/min/1.73)    Interpretation  G1                    90 or greater        Normal or high  (1)  G2                    60-89                Mild decrease (1)  G3a                   45-59                Mild to moderate decrease  G3b                   30-44                Moderate to severe decrease  G4                    15-29                Severe decrease  G5                    14 or less           Kidney failure    (1)In the absence of evidence of kidney disease, neither GFR category G1 or G2 fulfill the criteria for CKD.    eGFR calculation 2021 CKD-EPI creatinine equation, which does not include race as a factor   URINALYSIS W/ MICROSCOPIC IF INDICATED (NO CULTURE) - Abnormal; Notable for the following components:    Protein, UA 30 mg/dL (1+) (*)     All other components within normal limits   CBC WITH AUTO DIFFERENTIAL - Abnormal; Notable for the following components:    Platelets 497 (*)     Neutrophils, Absolute 7.21 (*)     All other components within normal limits   TSH RFX ON ABNORMAL TO FREE T4 - Abnormal; Notable for the following components:    TSH 9.890 (*)     All other components within normal limits   BNP (IN-HOUSE) - Abnormal; Notable for the following components:    proBNP 1,104.0 (*)     All other components within normal limits    Narrative:     This assay is used as an aid in the diagnosis of individuals suspected of having heart failure. It can be used as an aid in the diagnosis of acute decompensated heart failure (ADHF) in patients presenting with signs and symptoms of ADHF to the emergency department (ED). In addition, NT-proBNP of <300 pg/mL indicates ADHF is not likely.    Age Range Result Interpretation  NT-proBNP Concentration (pg/mL:      <50             Positive            >450                   Gray                 300-450                    Negative             <300    50-75           Positive            >900                  Gray                300-900                  Negative            <300      >75             Positive            >1800                  Merritt                 300-1800                  Negative            <300   CALCIUM, IONIZED - Abnormal; Notable for the following components:    Ionized Calcium 1.11 (*)     All other components within normal limits   TROPONIN - Abnormal; Notable for the following components:    HS Troponin T 29 (*)     All other components within normal limits    Narrative:     High Sensitive Troponin T Reference Range:  <14.0 ng/L- Negative Female for AMI  <22.0 ng/L- Negative Male for AMI  >=14 - Abnormal Female indicating possible myocardial injury.  >=22 - Abnormal Male indicating possible myocardial injury.   Clinicians would have to utilize clinical acumen, EKG, Troponin, and serial changes to determine if it is an Acute Myocardial Infarction or myocardial injury due to an underlying chronic condition.        MAGNESIUM - Abnormal; Notable for the following components:    Magnesium 1.4 (*)     All other components within normal limits   LACTIC ACID, PLASMA - Abnormal; Notable for the following components:    Lactate 3.2 (*)     All other components within normal limits   HIGH SENSITIVITIY TROPONIN T 1HR - Abnormal; Notable for the following components:    HS Troponin T 25 (*)     All other components within normal limits    Narrative:     High Sensitive Troponin T Reference Range:  <14.0 ng/L- Negative Female for AMI  <22.0 ng/L- Negative Male for AMI  >=14 - Abnormal Female indicating possible myocardial injury.  >=22 - Abnormal Male indicating possible myocardial injury.   Clinicians would have to utilize clinical acumen, EKG, Troponin, and serial changes to determine if it is an Acute Myocardial Infarction or myocardial injury due to an underlying chronic condition.        LIPASE - Normal   CK - Normal   PROCALCITONIN - Normal    Narrative:     As a Marker for Sepsis (Non-Neonates):    1. <0.5 ng/mL represents a low risk of severe sepsis and/or septic shock.  2. >2 ng/mL represents a high risk of severe sepsis and/or septic shock.    As a  "Marker for Lower Respiratory Tract Infections that require antibiotic therapy:    PCT on Admission    Antibiotic Therapy       6-12 Hrs later    >0.5                Strongly Recommended  >0.25 - <0.5        Recommended   0.1 - 0.25          Discouraged              Remeasure/reassess PCT  <0.1                Strongly Discouraged     Remeasure/reassess PCT    As 28 day mortality risk marker: \"Change in Procalcitonin Result\" (>80% or <=80%) if Day 0 (or Day 1) and Day 4 values are available. Refer to http://www.[email or web address] [email or web address]pct[place]cal[email or web address]    Change in PCT <=80%  A decrease of PCT levels below or equal to 80% defines a positive change in PCT test result representing a higher risk for 28-day all-cause mortality of patients diagnosed with severe sepsis for septic shock.    Change in PCT >80%  A decrease of PCT levels of more than 80% defines a negative change in PCT result representing a lower risk for 28-day all-cause mortality of patients diagnosed with severe sepsis or septic shock.      T4, FREE - Normal   PREGNANCY, URINE - Normal   LACTIC ACID, REFLEX - Normal   BLOOD CULTURE    Narrative:     Aerobic Bottle Only    Less than seven (7) mL's of blood was collected.  Insufficient quantity may yield false negative results.   BLOOD CULTURE   RAINBOW DRAW    Narrative:     The following orders were created for panel order [place] Draw.  Procedure                               Abnormality         Status                     ---------                               -----------         ------                     Green Top (Gel)[[id number]]                                  Final result               Lavender Top[[id number]]                                     Final result               Gold Top - [facility][[id number]]                                   Final result               M[place]itt Top[[id number]]                                         Final result               Light Blue Top[[id number]]                                   Final result   "               Please view results for these tests on the individual orders.   HCG, QUANTITATIVE, PREGNANCY    Narrative:     HCG Ranges by Gestational Age    Females - non-pregnant premenopausal   </= 1mIU/mL HCG  Females - postmenopausal               </= 7mIU/mL HCG    3 Weeks         5.8 -    71.2 mIU/mL  4 Weeks         9.5 -     750 mIU/mL  5 Weeks         217 -   7,138 mIU/mL  6 Weeks         158 -  31,795 mIU/mL  7 Weeks       3,697 - 163,563 mIU/mL  8 Weeks      32,065 - 149,571 mIU/mL  9 Weeks      63,803 - 151,410 mIU/mL  10 Weeks     46,509 - 186,977 mIU/mL  12 Weeks     27,832 - 210,612 mIU/mL  14 Weeks     13,950 -  62,530 mIU/mL  15 Weeks     12,039 -  70,971 mIU/mL  16 Weeks      9,040 -  56,451 mIU/mL  17 Weeks      8,175 -  55,868 mIU/mL  18 Weeks      8,099 -  58,176 mIU/mL   URINALYSIS, MICROSCOPIC ONLY   CBC AND DIFFERENTIAL    Narrative:     The following orders were created for panel order CBC & Differential.  Procedure                               Abnormality         Status                     ---------                               -----------         ------                     CBC Auto Differential[[id number]]        Abnormal            Final result                 Please view results for these tests on the individual orders.   GREEN TOP   LAVENDER TOP   GOLD TOP - SST   GRAY TOP   LIGHT BLUE TOP       Meds Given in ED:   Medications   Sodium Chloride (PF) 0.9 % 10 mL (has no administration in time range)   sodium chloride 0.9 % flush 10 mL (has no administration in time range)   valsartan (DIOVAN) tablet 160 mg (160 mg Oral Given 6/9/25 1134)   promethazine (PHENERGAN) 12.5 mg in sodium chloride 0.9 % 50 mL (has no administration in time range)   levothyroxine (SYNTHROID, LEVOTHROID) tablet 75 mcg (has no administration in time range)   niCARdipine (CARDENE) 25mg in 250mL NS infusion (5 mg/hr Intravenous New Bag 6/9/25 1244)   ondansetron (ZOFRAN) injection 4 mg (4 mg Intravenous Given  6/9/25 0821)   labetalol (NORMODYNE,TRANDATE) injection 20 mg (20 mg Intravenous Given 6/9/25 0821)   sepsis fluid NS 0.9 % bolus 2,604 mL (2,604 mL Intravenous New Bag 6/9/25 0824)   magnesium sulfate 2g/50 mL (PREMIX) infusion (0 g Intravenous Stopped 6/9/25 1133)   acetaminophen (TYLENOL) tablet 1,000 mg (1,000 mg Oral Given 6/9/25 1134)   iopamidol (ISOVUE-370) 76 % injection 100 mL (85 mL Intravenous Given 6/9/25 1110)     niCARdipine, 5-15 mg/hr, Last Rate: 5 mg/hr (06/09/25 1244)         Last NIH score:                                                          Dysphagia screening results:  Patient Factors Component (Dysphagia:Stroke or Rule-out)  Best Eye Response: 4-->(E4) spontaneous (06/09/25 0753)  Best Motor Response: 6-->(M6) obeys commands (06/09/25 0753)  Best Verbal Response: 5-->(V5) oriented (06/09/25 0753)  [place] Coma Scale Score: 15 (06/09/25 0753)     [place] Coma Scale:  No data recorded     CIWA:        Restraint Type:            Isolation Status:  No active isolations

## 2025-06-09 NOTE — H&P
[facility] Medicine Services  HISTORY AND PHYSICAL    Patient Name: Barbara Phillips  : 1975  MRN: [id number]  Primary Care Physician: Matilda Almendarez MD  Date of admission: 2025      Subjective   Subjective     Chief Complaint:  Nausea, vomiting    HPI:  Barbara Phillips is a 49 y.o. female w/ HTN, hypothyroidism, rheumatoid/psoriatic arthritis on MTX, morbid obesity, who is planned for TKA in the near future and last took her MTX 25 but held 25 dose in preparation for surgery. Approx 4 days ago she has developed visual hallucinations, seeing people that aren't there, or sometimes spiders on her hands. She states the the [beige] curtain on the wall has a bunch of different colors for her. Last night she acutely developed intractable N/V and has been up all night with the same. Today she has a severe headache. On arrival her BP was 237/139 mmHg, the ED started cardene gtt and requested admission for BP control.      Personal History     Past Medical History:   Diagnosis Date    Hypertension     Hypothyroidism     Rheumatoid arthritis            Past Surgical History:   Procedure Laterality Date    APPENDECTOMY       SECTION      CHOLECYSTECTOMY      TONSILLECTOMY         Family History: family history is not on file.     Social History:  reports that she does not drink alcohol.  Social History     Social History Narrative    Not on file       Medications:  Available home medication information reviewed.  celecoxib, levothyroxine, methotrexate, valsartan, and vitamin D    Allergies   Allergen Reactions    Morphine And Codeine        Objective   Objective     Vital Signs:   Temp:  [98.4 °F (36.9 °C)] 98.4 °F (36.9 °C)  Heart Rate:  [] 116  Resp:  [22] 22  BP: (183-237)/() 198/97       Physical Exam   Constitutional: Awake, alert, uncomfortable appearing female sitting up on ED stretcher  Respiratory: Clear to auscultation bilaterally, respiratory  effort normal   Cardiovascular: Regular tachycardia, palpable radial pulse  Gastrointestinal: Positive bowel sounds, soft, nontender, nondistended  Psychiatric: Appropriate affect, cooperative  Neurologic: Speech clear and fluent, moving all extremities spontaneously, no facial droop    Result Review:  I have personally reviewed the results from the time of this admission to 6/9/2025 14:01 EDT and agree with these findings:  [x]  Laboratory list / accordion  []  Microbiology  [x]  Radiology  []  EKG/Telemetry   []  Cardiology/Vascular   []  Pathology  []  Old records  []  Other:  Most notable findings include: Reviewed labs and imaging      LAB RESULTS:      Lab 06/09/25  1044 06/09/25  0747   WBC  --  10.40   HEMOGLOBIN  --  14.2   HEMATOCRIT  --  41.1   PLATELETS  --  497*   NEUTROS ABS  --  7.21*   IMMATURE GRANS (ABS)  --  0.03   LYMPHS ABS  --  2.13   MONOS ABS  --  0.71   EOS ABS  --  0.27   MCV  --  85.6   PROCALCITONIN  --  0.08   LACTATE 1.8 3.2*         Lab 06/09/25  0747   SODIUM 141   POTASSIUM 4.5   CHLORIDE 103   CO2 18.0*   ANION GAP 20.0*   BUN 13.6   CREATININE 0.97   EGFR 71.8   GLUCOSE 153*   CALCIUM 10.0   IONIZED CALCIUM 1.11*   MAGNESIUM 1.4*   TSH 9.890*         Lab 06/09/25  0747   TOTAL PROTEIN 8.5   ALBUMIN 4.7   GLOBULIN 3.8   ALT (SGPT) 11   AST (SGOT) 23   BILIRUBIN 0.4   ALK PHOS 112   LIPASE 13         Lab 06/09/25  0917 06/09/25  0747   PROBNP  --  1,104.0*   HSTROP T 25* 29*                 UA          6/9/2025    07:38   Urinalysis   Squamous Epithelial Cells, UA 0-2    Specific Gravity, UA 1.013    Ketones, UA Negative    Blood, UA Negative    Leukocytes, UA Negative    Nitrite, UA Negative    RBC, UA 0-2    WBC, UA 0-2    Bacteria, UA None Seen        Microbiology Results (last 10 days)       Procedure Component Value - Date/Time    Blood Culture - Blood, Hand, Left [[id number]] Collected: 06/09/25 0900    Lab Status: Preliminary result Specimen: Blood from Hand, Left Updated:  06/09/25 1000    Narrative:      Aerobic Bottle Only    Less than seven (7) mL's of blood was collected.  Insufficient quantity may yield false negative results.            CT Angiogram Chest  Result Date: 6/9/2025  CT ANGIOGRAM CHEST, CT ABDOMEN PELVIS W CONTRAST Date of Exam: 6/9/2025 11:00 AM EDT Indication: Hallucinations, tachycardia, ill. Comparison: None available. Technique: CTA of the chest was performed after the uneventful intravenous administration of 85 mL Isovue-370. Reconstructed coronal and sagittal images were also obtained. In addition, a 3-D volume rendered image was created for interpretation. Automated exposure control and iterative reconstruction methods were used. FINDINGS: Thoracic inlet: Unremarkable. Pulmonary arteries: Suboptimal contrast bolus timing limit evaluation of the small peripheral pulmonary arteries. No large central pulmonary embolism is seen. Great vessels: The thoracic aorta and proximal arch vessels appear unremarkable. Mediastinum/Ivy: No pathologically enlarged mediastinal lymph nodes are seen. The esophagus is unremarkable. Lung parenchyma: The lungs appear adequately aerated. No acute consolidation is seen. No suspicious pulmonary nodules are seen. Trachea and airways: The trachea and central airways appear unremarkable. Pleural space: No significant pleural effusion or pneumothorax is seen. Heart and pericardium: The heart and pericardium appear unremarkable. Liver: The liver is prominent in size and decreased in attenuation. No focal liver lesion is seen. No biliary dilation is seen. Gallbladder: Surgically absent. Pancreas: Unremarkable. Spleen: Unremarkable. Adrenal glands: Unremarkable. Genitourinary tract: Peripelvic cysts noted within the left kidney. Kidneys are otherwise unremarkable. No hydronephrosis is seen. The visualized portions of the ureters and urinary bladder appear unremarkable. 5 cm mass associated with the right uterus,  likely a fibroid.  Gastrointestinal tract: A few scattered colonic diverticula are seen. The hollow viscera appear otherwise unremarkable. There is no evidence of bowel obstruction. Appendix: The appendix is not identified. 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 No free air or free fluid is identified. No pathologically enlarged lymph nodes are seen. The abdominal aorta and IVC appear unremarkable. Bones and soft tissues: No acute or suspicious osseous or soft tissue lesion is identified.     Impression: 1.Suboptimal contrast bolus timing limits evaluation of the small peripheral pulmonary arteries. No large central pulmonary embolism is seen. 2.No acute abnormality is identified within the chest, abdomen, or pelvis. 3.Enlarged, fatty liver. 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 5.Probable 5 cm fibroid within the right uterus. 6.Additional findings as detailed above. Electronically Signed: Dilshad North MD  6/9/2025 11:36 AM EDT  Workstation ID: [id number]    CT Abdomen Pelvis With Contrast  Result Date: 6/9/2025  CT ANGIOGRAM CHEST, CT ABDOMEN PELVIS W CONTRAST Date of Exam: 6/9/2025 11:00 AM EDT Indication: Hallucinations, tachycardia, ill. Comparison: None available. Technique: CTA of the chest was performed after the uneventful intravenous administration of 85 mL Isovue-370. Reconstructed coronal and sagittal images were also obtained. In addition, a 3-D volume rendered image was created for interpretation. Automated exposure control and iterative reconstruction methods were used. FINDINGS: Thoracic inlet: Unremarkable. Pulmonary arteries: Suboptimal contrast bolus timing limit evaluation of the small peripheral pulmonary arteries. No large central pulmonary embolism is seen. Great vessels: The thoracic aorta and proximal arch vessels appear unremarkable. Mediastinum/Ivy: No  pathologically enlarged mediastinal lymph nodes are seen. The esophagus is unremarkable. Lung parenchyma: The lungs appear adequately aerated. No acute consolidation is seen. No suspicious pulmonary nodules are seen. Trachea and airways: The trachea and central airways appear unremarkable. Pleural space: No significant pleural effusion or pneumothorax is seen. Heart and pericardium: The heart and pericardium appear unremarkable. Liver: The liver is prominent in size and decreased in attenuation. No focal liver lesion is seen. No biliary dilation is seen. Gallbladder: Surgically absent. Pancreas: Unremarkable. Spleen: Unremarkable. Adrenal glands: Unremarkable. Genitourinary tract: Peripelvic cysts noted within the left kidney. Kidneys are otherwise unremarkable. No hydronephrosis is seen. The visualized portions of the ureters and urinary bladder appear unremarkable. 5 cm mass associated with the right uterus,  likely a fibroid. Gastrointestinal tract: A few scattered colonic diverticula are seen. The hollow viscera appear otherwise unremarkable. There is no evidence of bowel obstruction. Appendix: The appendix is not identified. 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 No free air or free fluid is identified. No pathologically enlarged lymph nodes are seen. The abdominal aorta and IVC appear unremarkable. Bones and soft tissues: No acute or suspicious osseous or soft tissue lesion is identified.     Impression: 1.Suboptimal contrast bolus timing limits evaluation of the small peripheral pulmonary arteries. No large central pulmonary embolism is seen. 2.No acute abnormality is identified within the chest, abdomen, or pelvis. 3.Enlarged, fatty liver. 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 5.Probable 5 cm fibroid within the right uterus. 6.Additional findings as  detailed above. Electronically Signed: Dilshad North MD  6/9/2025 11:36 AM EDT  Workstation ID: [id number]    XR Chest 1 View  Result Date: 6/9/2025  XR CHEST 1 VW Date of Exam: 6/9/2025 9:19 AM EDT Indication: elevated BNP Comparison: None available. Findings: Cardiomediastinal silhouette is unremarkable.  No airspace disease, pneumothorax, nor pleural effusion. No acute osseous abnormality identified.     Impression: Impression: No acute cardiopulmonary process. Electronically Signed: Dale Combs MD  6/9/2025 9:41 AM EDT  Workstation ID: [id number]    CT Head Without Contrast  Result Date: 6/9/2025  CT HEAD WO CONTRAST Date of Exam: 6/9/2025 8:40 AM EDT Indication: ams. Comparison: None available. Technique: Axial CT images were obtained of the head without contrast administration.  Automated exposure control and iterative construction methods were used. Findings: No acute intracranial hemorrhage or extra-axial collection is identified. The ventricles appear normal in caliber, with no evidence of mass effect or midline shift. The basal cisterns appear patent. The gray-white differentiation appears preserved. The calvarium appear intact. There is moderate frothy paranasal sinus mucosal disease. The mastoid air cells are well-aerated.     Impression: Impression: 1.No acute intracranial process identified. 2.Moderate frothy paranasal sinus mucosal disease. Correlate for acute sinusitis. Electronically Signed: Sohail Reyes MD  6/9/2025 9:06 AM EDT  Workstation ID: [id number]          Assessment & Plan   Assessment & Plan       Intractable vomiting with nausea    HTN (hypertension)    Rheumatoid arthritis    Hypertensive crisis    Summary: This is a 49-year-old female w/ HTN, hypothyroid, rheumatoid/psoriatic arthritis, morbid obesity, holding her MTX for planned TKA and developed visual hallucinations ~4 days pta, then the night before arrival developed intractable N/V followed by a headache, on arrival BP was  237/139 mmHg, , her Mag was 1.4, CT head was w/o acute process, CT a/p was w/o acute process and noted 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    Assessment/Plan    Intractable N/V  Headache  Visual hallucinations  Hypertensive crisis  - preliminary initial labs/imaging w/o evidence for infectious process  - cont cardene gtt, cont home losartan  - check inflammatory markers (CRP/ESR)  - check MRI to r/o PRES  - prn antiemetics  - IVF support  - single dose IV hydromorphone for severe headache (tylenol ineffective, can't have NSAIDS prior to planned TKA, and N/V preclude PO pain med)    Hypothyroidism - cont home levothyroxine  RA (reports rheumatoid/psoriatic arthritis) - typically takes MTX on Fridays, last dose 5/30/25 in plan for surgery  Morbid Obesity      VTE Prophylaxis:  Mechanical VTE prophylaxis orders are signed & held.            CODE STATUS:    Code Status and Medical Interventions: CPR (Attempt to Resuscitate); Full Support   Ordered at: 06/09/25 1400     Code Status (Patient has no pulse and is not breathing):    CPR (Attempt to Resuscitate)     Medical Interventions (Patient has pulse or is breathing):    Full Support       Expected Discharge   Expected discharge date/ time has not been documented.     Tutu Gay, DO  06/09/25

## 2025-06-09 NOTE — ED PROVIDER NOTES
EMERGENCY DEPARTMENT ENCOUNTER    Pt Name: Barbara Phillips  MRN: [id number]  Pt :   1975  Room Number:  S520/1  Date of encounter:  2025  PCP: Matilda Almendarez MD  ED Provider: MINDI Romo    Historian: Patient      HPI:  Chief Complaint: Vomiting, shakiness, legs pain        Context: Barbara Phillips is a 49 y.o. female who presents to the ED c/o vomiting, shaking X, legs pain.  Patient reports history of rheumatoid arthritis, currently preparing to a left TKA with Dr. Hernández next week.  Was taken of her maintenance medications(Celebrex, levothyroxine, methotrexate) on Hilary 3, she continues with valsartan for blood pressure control.  States started vomiting yesterday, had multiple episodes of emesis through the night.  No abdominal pain, fever, diarrhea.  Reports bilateral lower extremity cramping.  Furthermore the past few nights she had what she describes as hallucination -seeing somebody who was not there when awakes at night.  Denies psychiatric history, history of smoking, alcohol or drug use.      PAST MEDICAL HISTORY  Past Medical History:   Diagnosis Date    Hypertension     Hypothyroidism     Rheumatoid arthritis          PAST SURGICAL HISTORY  Past Surgical History:   Procedure Laterality Date    APPENDECTOMY       SECTION      CHOLECYSTECTOMY      TONSILLECTOMY           FAMILY HISTORY  Family History   Problem Relation Age of Onset    Breast cancer Neg Hx          SOCIAL HISTORY  Social History     Socioeconomic History    Marital status: Single   Tobacco Use    Smoking status: Never     Passive exposure: Never    Smokeless tobacco: Never   Vaping Use    Vaping status: Never Used   Substance and Sexual Activity    Alcohol use: No    Drug use: Never    Sexual activity: Yes         ALLERGIES  Morphine and codeine        REVIEW OF SYSTEMS  Review of Systems       All systems reviewed and negative except for those discussed in HPI.       PHYSICAL EXAM    I have reviewed the  triage vital signs and nursing notes.    ED Triage Vitals   Temp Pulse Resp BP SpO2   -- -- -- -- --      Temp src Heart Rate Source Patient Position BP Location FiO2 (%)   -- -- -- -- --       Physical Exam  GENERAL:   Appears in no acute distress.  Obese  HENT: Nares patent.  EYES: No scleral icterus.  CV: Regular rhythm, tachycardia  RESPIRATORY: Normal effort.  No audible wheezes, rales or rhonchi.  ABDOMEN: Soft, nontender  MUSCULOSKELETAL: No deformities.   NEURO: Alert, moves all extremities, follows commands.  Tremulous, no focal deficits  SKIN: Warm, dry, no rash visualized.  Flushed face      LAB RESULTS  Recent Results (from the past 24 hours)   Urinalysis With Microscopic If Indicated (No Culture) - Urine, Clean Catch    Collection Time: 06/09/25  7:38 AM    Specimen: Urine, Clean Catch   Result Value Ref Range    Color, UA Yellow Yellow, Straw    Appearance, UA Clear Clear    pH, UA 5.5 5.0 - 8.0    Specific Gravity, UA 1.013 1.001 - 1.030    Glucose, UA Negative Negative    Ketones, UA Negative Negative    Bilirubin, UA Negative Negative    Blood, UA Negative Negative    Protein, UA 30 mg/dL (1+) (A) Negative    Leuk Esterase, UA Negative Negative    Nitrite, UA Negative Negative    Urobilinogen, UA 0.2 E.U./dL 0.2 - 1.0 E.U./dL   Urinalysis, Microscopic Only - Urine, Clean Catch    Collection Time: 06/09/25  7:38 AM    Specimen: Urine, Clean Catch   Result Value Ref Range    RBC, UA 0-2 None Seen, 0-2 /HPF    WBC, UA 0-2 None Seen, 0-2 /HPF    Bacteria, UA None Seen None Seen, Trace /HPF    Squamous Epithelial Cells, UA 0-2 None Seen, 0-2 /HPF    Hyaline Casts, UA 0-6 0 - 6 /LPF    Methodology Automated Microscopy    Pregnancy, Urine - Urine, Clean Catch    Collection Time: 06/09/25  7:38 AM    Specimen: Urine, Clean Catch   Result Value Ref Range    HCG, Urine QL Negative Negative   Urine Drug Screen - Urine, Clean Catch    Collection Time: 06/09/25  7:38 AM    Specimen: Urine, Clean Catch   Result  Value Ref Range    THC, Screen, Urine Negative Negative    Phencyclidine (PCP), Urine Negative Negative    Cocaine Screen, Urine Negative Negative    Methamphetamine, Ur Negative Negative    Opiate Screen Negative Negative    Amphetamine Screen, Urine Negative Negative    Benzodiazepine Screen, Urine Negative Negative    Tricyclic Antidepressants Screen Negative Negative    Methadone Screen, Urine Negative Negative    Barbiturates Screen, Urine Negative Negative    Oxycodone Screen, Urine Negative Negative    Buprenorphine, Screen, Urine Negative Negative   Fentanyl, Urine - Urine, Clean Catch    Collection Time: 06/09/25  7:38 AM    Specimen: Urine, Clean Catch   Result Value Ref Range    Fentanyl, Urine Negative Negative   ECG 12 Lead Tachycardia    Collection Time: 06/09/25  7:42 AM   Result Value Ref Range    QT Interval 304 ms    QTC Interval 435 ms   Comprehensive Metabolic Panel    Collection Time: 06/09/25  7:47 AM    Specimen: Blood   Result Value Ref Range    Glucose 153 (H) 65 - 99 mg/dL    BUN 13.6 6.0 - 20.0 mg/dL    Creatinine 0.97 0.57 - 1.00 mg/dL    Sodium 141 136 - 145 mmol/L    Potassium 4.5 3.5 - 5.2 mmol/L    Chloride 103 98 - 107 mmol/L    CO2 18.0 (L) 22.0 - 29.0 mmol/L    Calcium 10.0 8.6 - 10.5 mg/dL    Total Protein 8.5 6.0 - 8.5 g/dL    Albumin 4.7 3.5 - 5.2 g/dL    ALT (SGPT) 11 1 - 33 U/L    AST (SGOT) 23 1 - 32 U/L    Alkaline Phosphatase 112 39 - 117 U/L    Total Bilirubin 0.4 0.0 - 1.2 mg/dL    Globulin 3.8 gm/dL    A/G Ratio 1.2 g/dL    BUN/Creatinine Ratio 14.0 7.0 - 25.0    Anion Gap 20.0 (H) 5.0 - 15.0 mmol/L    eGFR 71.8 >60.0 mL/min/1.73   Lipase    Collection Time: 06/09/25  7:47 AM    Specimen: Blood   Result Value Ref Range    Lipase 13 13 - 60 U/L   hCG, Quantitative, Pregnancy    Collection Time: 06/09/25  7:47 AM    Specimen: Blood   Result Value Ref Range    HCG Quantitative 1.37 mIU/mL   Green Top (Gel)    Collection Time: 06/09/25  7:47 AM   Result Value Ref Range     Extra Tube Hold for add-ons.    Lavender Top    Collection Time: 06/09/25  7:47 AM   Result Value Ref Range    Extra Tube hold for add-on    Gold Top - SST    Collection Time: 06/09/25  7:47 AM   Result Value Ref Range    Extra Tube Hold for add-ons.    Gray Top    Collection Time: 06/09/25  7:47 AM   Result Value Ref Range    Extra Tube Hold for add-ons.    Light Blue Top    Collection Time: 06/09/25  7:47 AM   Result Value Ref Range    Extra Tube Hold for add-ons.    CBC Auto Differential    Collection Time: 06/09/25  7:47 AM    Specimen: Blood   Result Value Ref Range    WBC 10.40 3.40 - 10.80 10*3/mm3    RBC 4.80 3.77 - 5.28 10*6/mm3    Hemoglobin 14.2 12.0 - 15.9 g/dL    Hematocrit 41.1 34.0 - 46.6 %    MCV 85.6 79.0 - 97.0 fL    MCH 29.6 26.6 - 33.0 pg    MCHC 34.5 31.5 - 35.7 g/dL    RDW 13.0 12.3 - 15.4 %    RDW-SD 39.8 37.0 - 54.0 fl    MPV 8.8 6.0 - 12.0 fL    Platelets 497 (H) 140 - 450 10*3/mm3    Neutrophil % 69.3 42.7 - 76.0 %    Lymphocyte % 20.5 19.6 - 45.3 %    Monocyte % 6.8 5.0 - 12.0 %    Eosinophil % 2.6 0.3 - 6.2 %    Basophil % 0.5 0.0 - 1.5 %    Immature Grans % 0.3 0.0 - 0.5 %    Neutrophils, Absolute 7.21 (H) 1.70 - 7.00 10*3/mm3    Lymphocytes, Absolute 2.13 0.70 - 3.10 10*3/mm3    Monocytes, Absolute 0.71 0.10 - 0.90 10*3/mm3    Eosinophils, Absolute 0.27 0.00 - 0.40 10*3/mm3    Basophils, Absolute 0.05 0.00 - 0.20 10*3/mm3    Immature Grans, Absolute 0.03 0.00 - 0.05 10*3/mm3    nRBC 0.0 0.0 - 0.2 /100 WBC   TSH Rfx On Abnormal To Free T4    Collection Time: 06/09/25  7:47 AM    Specimen: Blood   Result Value Ref Range    TSH 9.890 (H) 0.270 - 4.200 uIU/mL   CK    Collection Time: 06/09/25  7:47 AM    Specimen: Blood   Result Value Ref Range    Creatine Kinase 123 20 - 180 U/L   BNP    Collection Time: 06/09/25  7:47 AM    Specimen: Blood   Result Value Ref Range    proBNP 1,104.0 (H) 0.0 - 450.0 pg/mL   Calcium, Ionized    Collection Time: 06/09/25  7:47 AM    Specimen: Blood   Result  Value Ref Range    Ionized Calcium 1.11 (L) 1.15 - 1.30 mmol/L   High Sensitivity Troponin T    Collection Time: 06/09/25  7:47 AM    Specimen: Blood   Result Value Ref Range    HS Troponin T 29 (H) <14 ng/L   Magnesium    Collection Time: 06/09/25  7:47 AM    Specimen: Blood   Result Value Ref Range    Magnesium 1.4 (L) 1.6 - 2.6 mg/dL   Lactic Acid, Plasma    Collection Time: 06/09/25  7:47 AM    Specimen: Blood   Result Value Ref Range    Lactate 3.2 (C) 0.5 - 2.0 mmol/L   Procalcitonin    Collection Time: 06/09/25  7:47 AM    Specimen: Blood   Result Value Ref Range    Procalcitonin 0.08 0.00 - 0.25 ng/mL   T4, Free    Collection Time: 06/09/25  7:47 AM    Specimen: Blood   Result Value Ref Range    Free T4 0.94 0.92 - 1.68 ng/dL   Sedimentation Rate    Collection Time: 06/09/25  7:47 AM    Specimen: Blood   Result Value Ref Range    Sed Rate 53 (H) 0 - 20 mm/hr   High Sensitivity Troponin T 1Hr    Collection Time: 06/09/25  9:17 AM    Specimen: Arm, Right; Blood   Result Value Ref Range    HS Troponin T 25 (H) <14 ng/L    Troponin T Numeric Delta -4 ng/L    Troponin T % Delta -14 Abnormal if >/= 20%   C-reactive Protein    Collection Time: 06/09/25  9:17 AM    Specimen: Arm, Right; Blood   Result Value Ref Range    C-Reactive Protein 1.50 (H) 0.00 - 0.50 mg/dL   STAT Lactic Acid, Reflex    Collection Time: 06/09/25 10:44 AM    Specimen: Blood   Result Value Ref Range    Lactate 1.8 0.5 - 2.0 mmol/L       If labs were ordered, I independently reviewed the results and considered them in treating the patient.        RADIOLOGY  CT Angiogram Chest  Result Date: 6/9/2025  CT ANGIOGRAM CHEST, CT ABDOMEN PELVIS W CONTRAST Date of Exam: 6/9/2025 11:00 AM EDT Indication: Hallucinations, tachycardia, ill. Comparison: None available. Technique: CTA of the chest was performed after the uneventful intravenous administration of 85 mL Isovue-370. Reconstructed coronal and sagittal images were also obtained. In addition, a 3-D  volume rendered image was created for interpretation. Automated exposure control and iterative reconstruction methods were used. FINDINGS: Thoracic inlet: Unremarkable. Pulmonary arteries: Suboptimal contrast bolus timing limit evaluation of the small peripheral pulmonary arteries. No large central pulmonary embolism is seen. Great vessels: The thoracic aorta and proximal arch vessels appear unremarkable. Mediastinum/Ivy: No pathologically enlarged mediastinal lymph nodes are seen. The esophagus is unremarkable. Lung parenchyma: The lungs appear adequately aerated. No acute consolidation is seen. No suspicious pulmonary nodules are seen. Trachea and airways: The trachea and central airways appear unremarkable. Pleural space: No significant pleural effusion or pneumothorax is seen. Heart and pericardium: The heart and pericardium appear unremarkable. Liver: The liver is prominent in size and decreased in attenuation. No focal liver lesion is seen. No biliary dilation is seen. Gallbladder: Surgically absent. Pancreas: Unremarkable. Spleen: Unremarkable. Adrenal glands: Unremarkable. Genitourinary tract: Peripelvic cysts noted within the left kidney. Kidneys are otherwise unremarkable. No hydronephrosis is seen. The visualized portions of the ureters and urinary bladder appear unremarkable. 5 cm mass associated with the right uterus,  likely a fibroid. Gastrointestinal tract: A few scattered colonic diverticula are seen. The hollow viscera appear otherwise unremarkable. There is no evidence of bowel obstruction. Appendix: The appendix is not identified. 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 No free air or free fluid is identified. No pathologically enlarged lymph nodes are seen. The abdominal aorta and IVC appear unremarkable. Bones and soft tissues: No acute or suspicious osseous or soft tissue lesion is identified.     1.Suboptimal  contrast bolus timing limits evaluation of the small peripheral pulmonary arteries. No large central pulmonary embolism is seen. 2.No acute abnormality is identified within the chest, abdomen, or pelvis. 3.Enlarged, fatty liver. 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 5.Probable 5 cm fibroid within the right uterus. 6.Additional findings as detailed above. Electronically Signed: Dilshad North MD  6/9/2025 11:36 AM EDT  Workstation ID: [id number]    CT Abdomen Pelvis With Contrast  Result Date: 6/9/2025  CT ANGIOGRAM CHEST, CT ABDOMEN PELVIS W CONTRAST Date of Exam: 6/9/2025 11:00 AM EDT Indication: Hallucinations, tachycardia, ill. Comparison: None available. Technique: CTA of the chest was performed after the uneventful intravenous administration of 85 mL Isovue-370. Reconstructed coronal and sagittal images were also obtained. In addition, a 3-D volume rendered image was created for interpretation. Automated exposure control and iterative reconstruction methods were used. FINDINGS: Thoracic inlet: Unremarkable. Pulmonary arteries: Suboptimal contrast bolus timing limit evaluation of the small peripheral pulmonary arteries. No large central pulmonary embolism is seen. Great vessels: The thoracic aorta and proximal arch vessels appear unremarkable. Mediastinum/Ivy: No pathologically enlarged mediastinal lymph nodes are seen. The esophagus is unremarkable. Lung parenchyma: The lungs appear adequately aerated. No acute consolidation is seen. No suspicious pulmonary nodules are seen. Trachea and airways: The trachea and central airways appear unremarkable. Pleural space: No significant pleural effusion or pneumothorax is seen. Heart and pericardium: The heart and pericardium appear unremarkable. Liver: The liver is prominent in size and decreased in attenuation. No focal liver lesion is seen. No biliary dilation is seen. Gallbladder: Surgically  absent. Pancreas: Unremarkable. Spleen: Unremarkable. Adrenal glands: Unremarkable. Genitourinary tract: Peripelvic cysts noted within the left kidney. Kidneys are otherwise unremarkable. No hydronephrosis is seen. The visualized portions of the ureters and urinary bladder appear unremarkable. 5 cm mass associated with the right uterus,  likely a fibroid. Gastrointestinal tract: A few scattered colonic diverticula are seen. The hollow viscera appear otherwise unremarkable. There is no evidence of bowel obstruction. Appendix: The appendix is not identified. 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 No free air or free fluid is identified. No pathologically enlarged lymph nodes are seen. The abdominal aorta and IVC appear unremarkable. Bones and soft tissues: No acute or suspicious osseous or soft tissue lesion is identified.     1.Suboptimal contrast bolus timing limits evaluation of the small peripheral pulmonary arteries. No large central pulmonary embolism is seen. 2.No acute abnormality is identified within the chest, abdomen, or pelvis. 3.Enlarged, fatty liver. 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 5.Probable 5 cm fibroid within the right uterus. 6.Additional findings as detailed above. Electronically Signed: Dilshad North MD  6/9/2025 11:36 AM EDT  Workstation ID: [id number]    XR Chest 1 View  Result Date: 6/9/2025  XR CHEST 1 VW Date of Exam: 6/9/2025 9:19 AM EDT Indication: elevated BNP Comparison: None available. Findings: Cardiomediastinal silhouette is unremarkable.  No airspace disease, pneumothorax, nor pleural effusion. No acute osseous abnormality identified.     Impression: No acute cardiopulmonary process. Electronically Signed: Dale Combs MD  6/9/2025 9:41 AM EDT  Workstation ID: [id number]    CT Head Without Contrast  Result Date: 6/9/2025  CT  HEAD WO CONTRAST Date of Exam: 6/9/2025 8:40 AM EDT Indication: ams. Comparison: None available. Technique: Axial CT images were obtained of the head without contrast administration.  Automated exposure control and iterative construction methods were used. Findings: No acute intracranial hemorrhage or extra-axial collection is identified. The ventricles appear normal in caliber, with no evidence of mass effect or midline shift. The basal cisterns appear patent. The gray-white differentiation appears preserved. The calvarium appear intact. There is moderate frothy paranasal sinus mucosal disease. The mastoid air cells are well-aerated.     Impression: 1.No acute intracranial process identified. 2.Moderate frothy paranasal sinus mucosal disease. Correlate for acute sinusitis. Electronically Signed: Sohail Reyes MD  6/9/2025 9:06 AM EDT  Workstation ID: [id number]      I ordered and independently reviewed the above noted radiographic studies.      I viewed images of CT of the brain which showed no acute or cranial process per my independent interpretation.    See radiologist's dictation for official interpretation.        PROCEDURES    Procedures    ECG 12 Lead Tachycardia   Preliminary Result   Test Reason : Tachycardia   Blood Pressure :   */*   mmHG   Vent. Rate : 123 BPM     Atrial Rate : 123 BPM      P-R Int : 156 ms          QRS Dur :  92 ms       QT Int : 304 ms       P-R-T Axes :  33   1  36 degrees     QTcB Int : 435 ms      Sinus tachycardia   Minimal voltage criteria for LVH, may be normal variant   Cannot rule out Inferior infarct , age undetermined   Possible Anterior infarct , age undetermined   Abnormal ECG   No previous ECGs available      Referred By: EDMD           Confirmed By:           MEDICATIONS GIVEN IN ER    Medications   Sodium Chloride (PF) 0.9 % 10 mL (has no administration in time range)   niCARdipine (CARDENE) 25mg in 250mL NS infusion (15 mg/hr Intravenous Rate/Dose Change 6/9/25 2734)    levothyroxine (SYNTHROID, LEVOTHROID) tablet 75 mcg (has no administration in time range)   valsartan (DIOVAN) tablet 160 mg (has no administration in time range)   sodium chloride 0.9 % flush 10 mL (has no administration in time range)   sodium chloride 0.9 % flush 10 mL (has no administration in time range)   sodium chloride 0.9 % infusion 40 mL (has no administration in time range)   Potassium Replacement - Follow Nurse / BPA Driven Protocol (has no administration in time range)   Magnesium Standard Dose Replacement - Follow Nurse / BPA Driven Protocol (has no administration in time range)   Phosphorus Replacement - Follow Nurse / BPA Driven Protocol (has no administration in time range)   Calcium Replacement - Follow Nurse / BPA Driven Protocol (has no administration in time range)   acetaminophen (TYLENOL) tablet 650 mg (has no administration in time range)     Or   acetaminophen (TYLENOL) 160 MG/5ML oral solution 650 mg (has no administration in time range)     Or   acetaminophen (TYLENOL) suppository 650 mg (has no administration in time range)   sennosides-docusate (PERICOLACE) 8.6-50 MG per tablet 2 tablet (has no administration in time range)     And   polyethylene glycol (MIRALAX) packet 17 g (has no administration in time range)     And   bisacodyl (DULCOLAX) EC tablet 5 mg (has no administration in time range)     And   bisacodyl (DULCOLAX) suppository 10 mg (has no administration in time range)   ondansetron (ZOFRAN) injection 4 mg (has no administration in time range)   prochlorperazine (COMPAZINE) injection 5 mg (5 mg Intravenous Given 6/9/25 1505)   promethazine (PHENERGAN) 12.5 mg in sodium chloride 0.9 % 50 mL (has no administration in time range)   sodium chloride 0.9 % infusion (100 mL/hr Intravenous New Bag 6/9/25 1508)   melatonin tablet 5 mg (has no administration in time range)   temazepam (RESTORIL) capsule 15 mg (has no administration in time range)   ondansetron (ZOFRAN) injection 4 mg  (4 mg Intravenous Given 6/9/25 0821)   labetalol (NORMODYNE,TRANDATE) injection 20 mg (20 mg Intravenous Given 6/9/25 0821)   sepsis fluid NS 0.9 % bolus 2,604 mL (2,604 mL Intravenous New Bag 6/9/25 0824)   magnesium sulfate 2g/50 mL (PREMIX) infusion (0 g Intravenous Stopped 6/9/25 1133)   acetaminophen (TYLENOL) tablet 1,000 mg (1,000 mg Oral Given 6/9/25 1134)   iopamidol (ISOVUE-370) 76 % injection 100 mL (85 mL Intravenous Given 6/9/25 1110)   levothyroxine (SYNTHROID, LEVOTHROID) tablet 75 mcg (75 mcg Oral Given 6/9/25 1303)   promethazine (PHENERGAN) 12.5 mg in sodium chloride 0.9 % 50 mL (0 mg Intravenous Stopped 6/9/25 1358)   HYDROmorphone (DILAUDID) injection 0.5 mg (0.5 mg Intravenous Given 6/9/25 1505)         MEDICAL DECISION MAKING, PROGRESS, and CONSULTS    All labs, if obtained, have been independently reviewed by me.  All radiology studies, if obtained, have been reviewed by me and the radiologist dictating the report.  All EKG's, if obtained, have been independently viewed and interpreted by me/my attending physician.      Discussion below represents my analysis of pertinent findings related to patient's condition, differential diagnosis, treatment plan and final disposition.  Patient is a pleasant 49-year-old female with history of hypertension, hypothyroid, RA presented for evaluation of nausea, vomiting, headache, hallucinations.  On physical exam she was nontoxic, anxious appearing, BP  237/139 .  Lab work was significant for normal white count.  Discharge 10.4, stable hemoglobin.  14 point hematocrit 41.1, magnesium 1.4, proBNP 1104, mildly elevated troponin 29, trending down to 25, TSH 9.890, free T40.94, initially elevated lactate 3.2 that trended down with fluids to 1.8.  Patient received labetalol IV, valsartan 160 mg without significant improvement of blood pressure.  Started on Cardene drip.  CT of the brain, chest and abdomen showed no acute process.  Hospital service is  consulted for admission.                     Differential diagnosis:    Hypertensive emergency, hypertensive urgency, medication reaction, thyroid storm, PE, gastritis, gastroenteritis, electrolyte disbalance, sepsis, SIRS, delirium      Additional sources:    - Discussed/ obtained information from independent historians: Daughter    - External (non-ED) record review: [facility] orthopedic preoperative packet    - Chronic or social conditions impacting care: Obesity, rheumatoid arthritis, recent medication    - Shared decision making: Patient      Orders placed during this visit:  Orders Placed This Encounter   Procedures    Blood Culture - Blood,    Blood Culture - Blood,    CT Head Without Contrast    XR Chest 1 View    CT Angiogram Chest    CT Abdomen Pelvis With Contrast    MRI Brain Without Contrast    [place] Draw    Comprehensive Metabolic Panel    Lipase    Urinalysis With Microscopic If Indicated (No Culture) - Urine, Clean Catch    hCG, Quantitative, Pregnancy    CBC Auto Differential    TSH Rfx On Abnormal To Free T4    CK    BNP    Calcium, Ionized    High Sensitivity Troponin T    Magnesium    Urinalysis, Microscopic Only - Urine, Clean Catch    Lactic Acid, Plasma    Procalcitonin    High Sensitivity Troponin T 1Hr    T4, Free    Pregnancy, Urine - Urine, Clean Catch    STAT Lactic Acid, Reflex    Urine Drug Screen - Urine, Clean Catch    Fentanyl, Urine - Urine, Clean Catch    Sedimentation Rate    C-reactive Protein    Basic Metabolic Panel    Magnesium    Diet: Liquid; Clear Liquid; Fluid Consistency: Thin (IDDSI 0)    Undress & Gown    Vital Signs Recheck    Manual Blood Pressure - Notify provider of result    Vital Signs    Intake & Output    Weigh Patient    Oral Care    Saline Lock & Maintain IV Access    Place Sequential Compression Device    Maintain Sequential Compression Device    Up in Chair    Notify Provider (With Default Parameters)    Code Status and Medical Interventions: CPR (Attempt  to Resuscitate); Full Support    ECG 12 Lead Tachycardia    Insert Peripheral IV    Insert Peripheral IV    Insert Peripheral IV    Initiate Observation Status    ED Bed Request    CBC & Differential    Green Top (Gel)    Lavender Top    Gold Top - SST    Gray Top    Light Blue Top    CBC & Differential         Additional orders considered but not ordered:  UDS, ETOH    ED Course:    Consultants:      ED Course as of 06/09/25 1517   Mon Jun 09, 2025   0800 Reviewed medication list with patient and her daughter, she has note that she CAN take levothyroxine on the day of surgery.  Patient told me that when they discussed medication change with a provider it was very fast, she  misunderstood  [IR]   0844 WBC: 10.40 [IR]   0844 Hemoglobin: 14.2 [IR]   0844 Hematocrit: 41.1 [IR]   0844 Magnesium(!): 1.4  Magnesium replacement ordered [IR]   0944 Lactate(!!): 3.2 [IR]   1049 The patient, she is resting comfortably, she told me that she does have a headache and it is worsening.  She still feels nauseous without vomiting [IR]   1300 Manual blood pressure 210/123 [IR]      ED Course User Index  [IR] Nancy Clancy APRN              Shared Decision Making:  After my consideration of clinical presentation and any laboratory/radiology studies obtained, I discussed the findings with the patient/patient representative who is in agreement with the treatment plan and the final disposition.   Risks and benefits of discharge and/or observation/admission were discussed.       AS OF 15:17 EDT VITALS:    BP - 135/70  HR - 114  TEMP - 98.5 °F (36.9 °C) (Oral)  O2 SATS - 95%      PAN reviewed by Tutu Gay DO, Thacker, Dirk B, MD           DIAGNOSIS  Final diagnoses:   Hypertensive urgency   Hallucination   Nausea and vomiting, unspecified vomiting type   Tremors of nervous system         DISPOSITION  admit      Please note that portions of this document were completed with voice recognition software.     Nancy Clancy,  MINDI   06/09/25   15:17 Nancy Ramsay, APRN  06/09/25 151

## 2025-06-09 NOTE — PLAN OF CARE
Problem: Adult Inpatient Plan of Care  Goal: Plan of Care Review  Outcome: Progressing  Flowsheets (Taken 6/9/2025 1812)  Outcome Evaluation: Elevated BP, Cardene drip cont. Patient SOA with activity, mild edema in LE. Per patient this is improved from normal. Complaints of HA when admitted, see MAR for interventions. Some complaints of nausea, see MAR for interventions. Patient resting in bed with call light in reach.  Goal: Patient-Specific Goal (Individualized)  Outcome: Progressing  Goal: Absence of Hospital-Acquired Illness or Injury  Outcome: Progressing  Intervention: Identify and Manage Fall Risk  Recent Flowsheet Documentation  Taken 6/9/2025 1800 by Shilpi De Jesus RN  Safety Promotion/Fall Prevention:   activity supervised   clutter free environment maintained   assistive device/personal items within reach   fall prevention program maintained   toileting scheduled   safety round/check completed   room organization consistent   nonskid shoes/slippers when out of bed  Taken 6/9/2025 1600 by Shilpi De Jesus RN  Safety Promotion/Fall Prevention:   activity supervised   clutter free environment maintained   assistive device/personal items within reach   fall prevention program maintained   toileting scheduled   safety round/check completed   room organization consistent   nonskid shoes/slippers when out of bed  Taken 6/9/2025 1415 by Shilpi De Jesus, RN  Safety Promotion/Fall Prevention:   activity supervised   assistive device/personal items within reach   clutter free environment maintained   fall prevention program maintained   toileting scheduled   safety round/check completed   room organization consistent   nonskid shoes/slippers when out of bed  Intervention: Prevent Skin Injury  Recent Flowsheet Documentation  Taken 6/9/2025 1800 by Shilpi De Jesus RN  Body Position: position changed independently  Skin Protection: transparent dressing maintained  Taken 6/9/2025 1600 by Shilpi De Jesus, JING  Body  Position: position changed independently  Skin Protection: transparent dressing maintained  Taken 6/9/2025 1415 by Shilpi De Jesus RN  Body Position: position changed independently  Skin Protection: transparent dressing maintained  Intervention: Prevent and Manage VTE (Venous Thromboembolism) Risk  Recent Flowsheet Documentation  Taken 6/9/2025 1415 by Shilpi De Jesus RN  VTE Prevention/Management: patient refused intervention  Taken 6/9/2025 1409 by Shilpi De Jesus RN  VTE Prevention/Management: patient refused intervention  Intervention: Prevent Infection  Recent Flowsheet Documentation  Taken 6/9/2025 1800 by Shilpi De Jesus RN  Infection Prevention:   environmental surveillance performed   hand hygiene promoted   rest/sleep promoted   single patient room provided  Taken 6/9/2025 1600 by Shilpi De Jesus RN  Infection Prevention:   environmental surveillance performed   hand hygiene promoted   rest/sleep promoted   single patient room provided  Taken 6/9/2025 1415 by Shilpi De Jesus RN  Infection Prevention:   environmental surveillance performed   hand hygiene promoted   rest/sleep promoted   single patient room provided  Goal: Optimal Comfort and Wellbeing  Outcome: Progressing  Intervention: Monitor Pain and Promote Comfort  Recent Flowsheet Documentation  Taken 6/9/2025 1505 by Shilpi De Jesus RN  Pain Management Interventions: pain medication given  Intervention: Provide Person-Centered Care  Recent Flowsheet Documentation  Taken 6/9/2025 1415 by Shilpi De Jesus RN  Trust Relationship/Rapport:   care explained   choices provided   questions answered   questions encouraged   reassurance provided   thoughts/feelings acknowledged  Goal: Readiness for Transition of Care  Outcome: Progressing  Intervention: Mutually Develop Transition Plan  Recent Flowsheet Documentation  Taken 6/9/2025 1412 by Shilpi De Jesus RN  Transportation Anticipated: car, drives self  Patient/Family Anticipated Services at  Transition: none  Patient/Family Anticipates Transition to: home with family  Taken 6/9/2025 1411 by Shilpi De Jesus RN  Equipment Currently Used at Home: none     Problem: Comorbidity Management  Goal: Blood Pressure in Desired Range  Outcome: Progressing  Intervention: Maintain Blood Pressure Management  Recent Flowsheet Documentation  Taken 6/9/2025 1800 by Shilpi De Jesus RN  Medication Review/Management: medications reviewed  Taken 6/9/2025 1600 by Shilpi De Jesus RN  Medication Review/Management: medications reviewed  Taken 6/9/2025 1415 by Shilpi De Jesus RN  Medication Review/Management: medications reviewed     Problem: Sepsis/Septic Shock  Goal: Optimal Coping  Outcome: Progressing  Intervention: Support Patient and Family Response  Recent Flowsheet Documentation  Taken 6/9/2025 1415 by Shilpi De Jesus RN  Family/Support System Care: support provided  Goal: Absence of Bleeding  Outcome: Progressing  Goal: Blood Glucose Level Within Target Range  Outcome: Progressing  Goal: Absence of Infection Signs and Symptoms  Outcome: Progressing  Intervention: Initiate Sepsis Management  Recent Flowsheet Documentation  Taken 6/9/2025 1800 by Shilpi De Jesus RN  Infection Prevention:   environmental surveillance performed   hand hygiene promoted   rest/sleep promoted   single patient room provided  Taken 6/9/2025 1600 by Shilpi De Jesus RN  Infection Prevention:   environmental surveillance performed   hand hygiene promoted   rest/sleep promoted   single patient room provided  Taken 6/9/2025 1415 by Shilpi De Jesus RN  Infection Prevention:   environmental surveillance performed   hand hygiene promoted   rest/sleep promoted   single patient room provided  Intervention: Promote Recovery  Recent Flowsheet Documentation  Taken 6/9/2025 1800 by Shilpi De Jesus RN  Activity Management: activity encouraged  Taken 6/9/2025 1600 by Shilpi De Jesus RN  Activity Management: activity encouraged  Taken 6/9/2025 1415 by  Shilpi De Jesus, RN  Activity Management: activity encouraged  Goal: Optimal Nutrition Delivery  Outcome: Progressing   Goal Outcome Evaluation:              Outcome Evaluation: Elevated BP, Cardene drip cont. Patient SOA with activity, mild edema in LE. Per patient this is improved from normal. Complaints of HA when admitted, see MAR for interventions. Some complaints of nausea, see MAR for interventions. Patient resting in bed with call light in reach.

## 2025-06-09 NOTE — Clinical Note
Level of Care: Telemetry [5]   Diagnosis: HTN (hypertension) [988413]   Admitting Physician: MARYLU DOHERTY [[id number]]   Is patient appropriate for Inpatient Observation Unit?: No [0]

## 2025-06-10 ENCOUNTER — APPOINTMENT (OUTPATIENT)
Dept: MRI IMAGING | Facility: HOSPITAL | Age: 50
DRG: 065 | End: 2025-06-10
Payer: COMMERCIAL

## 2025-06-10 LAB
ANION GAP SERPL CALCULATED.3IONS-SCNC: 12 MMOL/L (ref 5–15)
BASOPHILS # BLD AUTO: 0.07 10*3/MM3 (ref 0–0.2)
BASOPHILS NFR BLD AUTO: 0.7 % (ref 0–1.5)
BUN SERPL-MCNC: 6.2 MG/DL (ref 6–20)
BUN/CREAT SERPL: 9 (ref 7–25)
CALCIUM SPEC-SCNC: 8.3 MG/DL (ref 8.6–10.5)
CHLORIDE SERPL-SCNC: 107 MMOL/L (ref 98–107)
CO2 SERPL-SCNC: 23 MMOL/L (ref 22–29)
CREAT SERPL-MCNC: 0.69 MG/DL (ref 0.57–1)
DEPRECATED RDW RBC AUTO: 44.2 FL (ref 37–54)
EGFRCR SERPLBLD CKD-EPI 2021: 106.5 ML/MIN/1.73
EOSINOPHIL # BLD AUTO: 0.57 10*3/MM3 (ref 0–0.4)
EOSINOPHIL NFR BLD AUTO: 6 % (ref 0.3–6.2)
ERYTHROCYTE [DISTWIDTH] IN BLOOD BY AUTOMATED COUNT: 13.3 % (ref 12.3–15.4)
GLUCOSE SERPL-MCNC: 116 MG/DL (ref 65–99)
HCT VFR BLD AUTO: 37.8 % (ref 34–46.6)
HGB BLD-MCNC: 12.3 G/DL (ref 12–15.9)
IMM GRANULOCYTES # BLD AUTO: 0.02 10*3/MM3 (ref 0–0.05)
IMM GRANULOCYTES NFR BLD AUTO: 0.2 % (ref 0–0.5)
LYMPHOCYTES # BLD AUTO: 2.88 10*3/MM3 (ref 0.7–3.1)
LYMPHOCYTES NFR BLD AUTO: 30.4 % (ref 19.6–45.3)
MAGNESIUM SERPL-MCNC: 1.7 MG/DL (ref 1.6–2.6)
MCH RBC QN AUTO: 29.8 PG (ref 26.6–33)
MCHC RBC AUTO-ENTMCNC: 32.5 G/DL (ref 31.5–35.7)
MCV RBC AUTO: 91.5 FL (ref 79–97)
MONOCYTES # BLD AUTO: 0.93 10*3/MM3 (ref 0.1–0.9)
MONOCYTES NFR BLD AUTO: 9.8 % (ref 5–12)
NEUTROPHILS NFR BLD AUTO: 5 10*3/MM3 (ref 1.7–7)
NEUTROPHILS NFR BLD AUTO: 52.9 % (ref 42.7–76)
NRBC BLD AUTO-RTO: 0 /100 WBC (ref 0–0.2)
PLATELET # BLD AUTO: 403 10*3/MM3 (ref 140–450)
PMV BLD AUTO: 8.9 FL (ref 6–12)
POTASSIUM SERPL-SCNC: 3.8 MMOL/L (ref 3.5–5.2)
QT INTERVAL: 304 MS
QTC INTERVAL: 435 MS
RBC # BLD AUTO: 4.13 10*6/MM3 (ref 3.77–5.28)
SODIUM SERPL-SCNC: 142 MMOL/L (ref 136–145)
WBC NRBC COR # BLD AUTO: 9.47 10*3/MM3 (ref 3.4–10.8)

## 2025-06-10 PROCEDURE — G0378 HOSPITAL OBSERVATION PER HR: HCPCS

## 2025-06-10 PROCEDURE — 25010000002 NICARDIPINE 2.5 MG/ML SOLUTION 10 ML VIAL: Performed by: NURSE PRACTITIONER

## 2025-06-10 PROCEDURE — 25010000002 ONDANSETRON PER 1 MG: Performed by: INTERNAL MEDICINE

## 2025-06-10 PROCEDURE — 25810000003 SODIUM CHLORIDE 0.9 % SOLUTION 1,000 ML FLEX CONT: Performed by: NURSE PRACTITIONER

## 2025-06-10 PROCEDURE — 72156 MRI NECK SPINE W/O & W/DYE: CPT

## 2025-06-10 PROCEDURE — 99232 SBSQ HOSP IP/OBS MODERATE 35: CPT | Performed by: INTERNAL MEDICINE

## 2025-06-10 PROCEDURE — 85025 COMPLETE CBC W/AUTO DIFF WBC: CPT | Performed by: INTERNAL MEDICINE

## 2025-06-10 PROCEDURE — 25810000003 SODIUM CHLORIDE 0.9 % SOLUTION 250 ML FLEX CONT: Performed by: NURSE PRACTITIONER

## 2025-06-10 PROCEDURE — 99223 1ST HOSP IP/OBS HIGH 75: CPT | Performed by: STUDENT IN AN ORGANIZED HEALTH CARE EDUCATION/TRAINING PROGRAM

## 2025-06-10 PROCEDURE — 80048 BASIC METABOLIC PNL TOTAL CA: CPT | Performed by: INTERNAL MEDICINE

## 2025-06-10 PROCEDURE — 25810000003 SODIUM CHLORIDE 0.9 % SOLUTION: Performed by: INTERNAL MEDICINE

## 2025-06-10 PROCEDURE — 83735 ASSAY OF MAGNESIUM: CPT | Performed by: INTERNAL MEDICINE

## 2025-06-10 PROCEDURE — 70553 MRI BRAIN STEM W/O & W/DYE: CPT

## 2025-06-10 RX ORDER — HYDROCODONE BITARTRATE AND ACETAMINOPHEN 5; 325 MG/1; MG/1
1 TABLET ORAL EVERY 12 HOURS PRN
Refills: 0 | Status: DISCONTINUED | OUTPATIENT
Start: 2025-06-10 | End: 2025-06-13 | Stop reason: HOSPADM

## 2025-06-10 RX ORDER — CARVEDILOL 6.25 MG/1
6.25 TABLET ORAL 2 TIMES DAILY WITH MEALS
Status: DISCONTINUED | OUTPATIENT
Start: 2025-06-10 | End: 2025-06-11

## 2025-06-10 RX ADMIN — HYDROCODONE BITARTRATE AND ACETAMINOPHEN 1 TABLET: 5; 325 TABLET ORAL at 09:19

## 2025-06-10 RX ADMIN — SODIUM CHLORIDE 100 ML/HR: 9 INJECTION, SOLUTION INTRAVENOUS at 21:44

## 2025-06-10 RX ADMIN — VALSARTAN 160 MG: 160 TABLET, FILM COATED ORAL at 09:19

## 2025-06-10 RX ADMIN — NICARDIPINE HYDROCHLORIDE 10 MG/HR: 25 INJECTION, SOLUTION INTRAVENOUS at 21:50

## 2025-06-10 RX ADMIN — NICARDIPINE HYDROCHLORIDE 12.5 MG/HR: 25 INJECTION, SOLUTION INTRAVENOUS at 12:33

## 2025-06-10 RX ADMIN — ACETAMINOPHEN 650 MG: 325 TABLET ORAL at 15:41

## 2025-06-10 RX ADMIN — Medication 5 MG: at 21:01

## 2025-06-10 RX ADMIN — NICARDIPINE HYDROCHLORIDE 10 MG/HR: 25 INJECTION, SOLUTION INTRAVENOUS at 01:34

## 2025-06-10 RX ADMIN — NICARDIPINE HYDROCHLORIDE 10 MG/HR: 25 INJECTION, SOLUTION INTRAVENOUS at 22:57

## 2025-06-10 RX ADMIN — ONDANSETRON 4 MG: 2 INJECTION INTRAMUSCULAR; INTRAVENOUS at 02:12

## 2025-06-10 RX ADMIN — ACETAMINOPHEN 650 MG: 325 TABLET ORAL at 05:18

## 2025-06-10 RX ADMIN — NICARDIPINE HYDROCHLORIDE 12.5 MG/HR: 25 INJECTION, SOLUTION INTRAVENOUS at 20:48

## 2025-06-10 RX ADMIN — NICARDIPINE HYDROCHLORIDE 10 MG/HR: 25 INJECTION, SOLUTION INTRAVENOUS at 14:08

## 2025-06-10 RX ADMIN — SODIUM CHLORIDE 100 ML/HR: 9 INJECTION, SOLUTION INTRAVENOUS at 02:16

## 2025-06-10 RX ADMIN — NICARDIPINE HYDROCHLORIDE 12.5 MG/HR: 25 INJECTION, SOLUTION INTRAVENOUS at 06:28

## 2025-06-10 RX ADMIN — ACETAMINOPHEN 650 MG: 325 TABLET ORAL at 20:47

## 2025-06-10 RX ADMIN — NICARDIPINE HYDROCHLORIDE 10 MG/HR: 25 INJECTION, SOLUTION INTRAVENOUS at 17:08

## 2025-06-10 RX ADMIN — CARVEDILOL 6.25 MG: 6.25 TABLET, FILM COATED ORAL at 17:08

## 2025-06-10 RX ADMIN — NICARDIPINE HYDROCHLORIDE 10 MG/HR: 25 INJECTION, SOLUTION INTRAVENOUS at 09:20

## 2025-06-10 RX ADMIN — Medication 10 ML: at 20:52

## 2025-06-10 RX ADMIN — LEVOTHYROXINE SODIUM 75 MCG: 0.03 TABLET ORAL at 06:26

## 2025-06-10 RX ADMIN — NICARDIPINE HYDROCHLORIDE 10 MG/HR: 25 INJECTION, SOLUTION INTRAVENOUS at 04:20

## 2025-06-10 NOTE — PLAN OF CARE
Goal Outcome Evaluation:      BP reamain elevated during shift. Cardene drip cont. Patient had complaints of Lower extremity pain and headache. See MAR for interventions. Other VSS. No complaints of nausea during shift. Patient resting in bed with call light in reach.

## 2025-06-10 NOTE — CONSULTS
[facility] Neurology  Consult Note    Patient Name: Barbara Phillips  : 1975  MRN: [id number]  Primary Care Physician:  Matilda Almendarez MD  Referring Physician: No ref. provider found  Date of admission: 2025    Subjective     Reason for Consult: MRI concerning for demyelinating process versus vasculitis    Barbara Phillips is a 49 y.o. female with history of rheumatoid arthritis, hypertension, hypothyroidism who presented to [facility] ED on  with vomiting, shaking and leg pain.    Patient developed visual hallucinations 4 days prior to admission.  She was seeing colors, people that were not there, bugs crawling on her hands.  The night before coming to the ER she developed intractable nausea and vomiting followed by headache.  BP on arrival was 237/139.  CT head was unremarkable.     Nausea and vomiting have resolved.  Blood pressure is now controlled.    Neurology was asked to see the patient after MRI was obtained to rule out PRES.  MRI showed findings concerning for possible demyelination or vasculitis.  Patient has never had any neurologic symptoms, including vision loss, painful eye movements, unilateral numbness or weakness.    Review Of Systems   Constitutional:   Cardiovascular: Negative for chest pain or palpitations.  Respiratory: Negative for shortness of breath or cough.  Gastrointestinal: Positive for nausea and vomiting.  Genitourinary: Negative for bladder incontinence.  Musculoskeletal: Negative for aches and pains in the muscles or joints.  Dermatological: Negative for skin breakdown.   Neurological: Negative for headache, pain, weakness or vision changes.     Personal History     Past Medical History:   Diagnosis Date    Hypertension     Hypothyroidism     Rheumatoid arthritis        Past Surgical History:   Procedure Laterality Date    APPENDECTOMY       SECTION      CHOLECYSTECTOMY      TONSILLECTOMY         Family History: family history is not on file. Otherwise pertinent  FHx was reviewed and not pertinent to current issue.    Social History:  reports that she has never smoked. She has never been exposed to tobacco smoke. She has never used smokeless tobacco. She reports that she does not drink alcohol and does not use drugs.    Home Medications:   celecoxib, levothyroxine, methotrexate, valsartan, and vitamin D    Current Medications:     Current Facility-Administered Medications:     acetaminophen (TYLENOL) tablet 650 mg, 650 mg, Oral, Q4H PRN, 650 mg at 06/10/25 0518 **OR** acetaminophen (TYLENOL) 160 MG/5ML oral solution 650 mg, 650 mg, Oral, Q4H PRN **OR** acetaminophen (TYLENOL) suppository 650 mg, 650 mg, Rectal, Q4H PRN, Tutu Gay DO    sennosides-docusate (PERICOLACE) 8.6-50 MG per tablet 2 tablet, 2 tablet, Oral, BID PRN **AND** polyethylene glycol (MIRALAX) packet 17 g, 17 g, Oral, Daily PRN **AND** bisacodyl (DULCOLAX) EC tablet 5 mg, 5 mg, Oral, Daily PRN **AND** bisacodyl (DULCOLAX) suppository 10 mg, 10 mg, Rectal, Daily PRN, Tutu Gay DO    Calcium Replacement - Follow Nurse / BPA Driven Protocol, , Not Applicable, PRN, Tutu Gay DO    HYDROcodone-acetaminophen (NORCO) 5-325 MG per tablet 1 tablet, 1 tablet, Oral, Q12H PRN, Rocío Mehta MD, 1 tablet at 06/10/25 0919    levothyroxine (SYNTHROID, LEVOTHROID) tablet 75 mcg, 75 mcg, Oral, Q AM, Tutu Gay DO, 75 mcg at 06/10/25 0626    Magnesium Standard Dose Replacement - Follow Nurse / BPA Driven Protocol, , Not Applicable, PRN, Tutu Gay DO    melatonin tablet 5 mg, 5 mg, Oral, Nightly PRN, Tutu Gay DO, 5 mg at 06/09/25 2145    niCARdipine (CARDENE) 25mg in 250mL NS infusion, 5-15 mg/hr, Intravenous, Titrated, Stow, Nancy V, APRN, Last Rate: 50 mL/hr at 06/10/25 0957, 5 mg/hr at 06/10/25 0957    ondansetron (ZOFRAN) injection 4 mg, 4 mg, Intravenous, Q6H PRN, Tutu Gay DO, 4 mg at 06/10/25 0212    Phosphorus Replacement -  Follow Nurse / BPA Driven Protocol, , Not Applicable, PRN, Tutu Gay, DO    Potassium Replacement - Follow Nurse / BPA Driven Protocol, , Not Applicable, PRN, Tutu Gay, DO    prochlorperazine (COMPAZINE) injection 5 mg, 5 mg, Intravenous, Q6H PRN, Tutu Gay, DO, 5 mg at 06/09/25 1505    promethazine (PHENERGAN) 12.5 mg in sodium chloride 0.9 % 50 mL, 12.5 mg, Intravenous, Q6H PRN, Tutu Gay, DO, 12.5 mg at 06/09/25 1523    Sodium Chloride (PF) 0.9 % 10 mL, 10 mL, Intravenous, PRN, Dale Nick MD    sodium chloride 0.9 % flush 10 mL, 10 mL, Intravenous, Q12H, Tutu Gay, DO, 10 mL at 06/09/25 2146    sodium chloride 0.9 % flush 10 mL, 10 mL, Intravenous, PRN, Tutu Gay,     sodium chloride 0.9 % infusion 40 mL, 40 mL, Intravenous, PRN, Tutu Gay, DO    sodium chloride 0.9 % infusion, 100 mL/hr, Intravenous, Continuous, Tutu Gay, , Last Rate: 100 mL/hr at 06/10/25 0522, 100 mL/hr at 06/10/25 0522    temazepam (RESTORIL) capsule 15 mg, 15 mg, Oral, Nightly PRN, Tutu Gay,     valsartan (DIOVAN) tablet 160 mg, 160 mg, Oral, Daily, Tutu Gay, DO, 160 mg at 06/10/25 0919     Allergies:  Allergies   Allergen Reactions    Morphine And Codeine        Objective     Vitals:  Temp:  [98.3 °F (36.8 °C)-98.5 °F (36.9 °C)] 98.3 °F (36.8 °C)  Heart Rate:  [] 114  Resp:  [16-18] 18  BP: (117-215)/() 128/65  Flow (L/min) (Oxygen Therapy):  [2] 2    Neurologic Exam   Mental status/Cognition: Awake and alert  Speech/language: fluent; follows commands    Cranial nerves: PERRL. EOMI. Face appears symmetric. No dysarthria.  Shoulder shrug symmetric.  Tongue protrudes midline    Motor: No drift in any extremities, able to raise all to antigravity.    Sensation: intact to light touch throughout    Reflexes: 2+ at bilateral biceps, brachioradialis, patellas, achilles. Toes down.     Coordination/Complex Motor:  "Finger-to-nose intact bilaterally without dysmetria        Laboratory Results:   Lab Results   Component Value Date    GLUCOSE 153 (H) 06/09/2025    CALCIUM 10.0 06/09/2025     06/09/2025    K 4.5 06/09/2025    CO2 18.0 (L) 06/09/2025     06/09/2025    BUN 13.6 06/09/2025    CREATININE 0.97 06/09/2025    BCR 14.0 06/09/2025    ANIONGAP 20.0 (H) 06/09/2025     Lab Results   Component Value Date    WBC 9.47 06/10/2025    HGB 12.3 06/10/2025    HCT 37.8 06/10/2025    MCV 91.5 06/10/2025     06/10/2025     No results found for: \"CHOL\"  No results found for: \"HDL\"  No results found for: \"LDL\"  No results found for: \"TRIG\"  Lab Results   Component Value Date    HGBA1C 6.00 (H) 10/17/2024     No results found for: \"ILXSPMEU03\"  No results found for: \"FOLATE\"    Blood culture NGTD    TSH 9.89  Free T4 0.94    CRP 1.5  Lactate 1.8 down from 3.2  Pro-Kvng 0.08  Sed rate 53    UA noninfectious    Images/Procedures   CT head 6/9/2025  No acute findings    MRI brain without contrast 6/9/2025  White matter changes likely demyelination disease with differential considerations including sequelae of prior inflammation or infection, vasculitis, chronic small vessel ischemic change    Assessment / Plan   Active Hospital Problems:  Concern for demyelination versus vasculitis versus chronic small vessel disease    Brief Patient Summary:  Barbara Phillips is a 49 y.o. female with history of rheumatoid arthritis, hypertension, hypothyroidism who presented to [facility] ED on 6/9 with vomiting, shaking and leg pain.  Neurology was consulted after MRI brain was obtained to rule out PRES that showed possible demyelination.  Patient denies prior or current neurologic symptoms.  Her exam is intact, no signs of hyperreflexia or upgoing toes, strength is equal bilaterally.    Her MRI does have features suggestive of demyelination, likely mixed with some small vessel disease.  However it was done without contrast which is needed to confirm " active demyelination.  She is not having any current neurologic symptoms, but would recommend repeating with contrast to look for any areas of active demyelination.  Ultimately she will need a lumbar puncture to look for oligoclonal bands to confirm the diagnosis of multiple sclerosis, but she would rather defer this to the outpatient setting.    Her visual hallucinations are likely due to hypertensive encephalopathy.     Plan:   - MRI brain with contrast  - MRI cervical spine with and without contrast  - Recommend CSF evaluation for MS outpatient  - Follow-up outpatient neurology, [facility] OBREGON in 1 to 2 months    I have discussed the above with the patient, family, hospitalist  Time spent with patient: 60 minutes in face-to-face evaluation and management of the patient.       Vidal Weldon, DO

## 2025-06-10 NOTE — PLAN OF CARE
Problem: Adult Inpatient Plan of Care  Goal: Absence of Hospital-Acquired Illness or Injury  Intervention: Prevent Skin Injury  Recent Flowsheet Documentation  Taken 6/10/2025 0600 by Dago Walls RN  Body Position: position changed independently  Skin Protection:   transparent dressing maintained   silicone foam dressing in place   incontinence pads utilized  Taken 6/10/2025 0400 by Dago Walls RN  Body Position: position changed independently  Skin Protection: transparent dressing maintained  Taken 6/10/2025 0200 by Dago Walls RN  Body Position: position changed independently  Skin Protection: transparent dressing maintained     Problem: Adult Inpatient Plan of Care  Goal: Optimal Comfort and Wellbeing  Intervention: Provide Person-Centered Care  Recent Flowsheet Documentation  Taken 6/10/2025 0600 by Dago Walls RN  Trust Relationship/Rapport:   care explained   choices provided   thoughts/feelings acknowledged   reassurance provided  Taken 6/10/2025 0400 by Dago Walls RN  Trust Relationship/Rapport:   care explained   choices provided   thoughts/feelings acknowledged   reassurance provided  Taken 6/10/2025 0200 by Dago Walls RN  Trust Relationship/Rapport:   care explained   choices provided   thoughts/feelings acknowledged   reassurance provided     Problem: Comorbidity Management  Goal: Blood Pressure in Desired Range  Intervention: Maintain Blood Pressure Management  Recent Flowsheet Documentation  Taken 6/10/2025 0600 by Dago Walls RN  Medication Review/Management: medications reviewed  Taken 6/10/2025 0400 by Dago Walls RN  Medication Review/Management: medications reviewed  Taken 6/10/2025 0200 by Dago Walls RN  Medication Review/Management: medications reviewed   Goal Outcome Evaluation:  Problem: Sepsis/Septic Shock  Goal: Absence of Infection Signs and Symptoms  Intervention: Promote Recovery  Recent Flowsheet Documentation  Taken  6/10/2025 0600 by Dago Walls, RN  Activity Management: activity encouraged  Taken 6/10/2025 0400 by Dago Walls, RN  Activity Management: activity encouraged  Sleep/Rest Enhancement:   room darkened   relaxation techniques promoted  Taken 6/10/2025 0200 by Dago Walls, RN  Activity Management: activity encouraged  Sleep/Rest Enhancement:   room darkened   relaxation techniques promoted      -Assumed care of pt around 0130.   -Cardene titrated to maintain SBP <180.   -Tylenol given x1 for BLE pain.   -2L NC while pt sleeping.

## 2025-06-10 NOTE — PROGRESS NOTES
[facility] Medicine Services  PROGRESS NOTE    Patient Name: Barbara Phillips  : 1975  MRN: [id number]    Date of Admission: 2025  Primary Care Physician: Matilda Almendarez MD    Subjective   Subjective     CC:  Hallucinations     HPI:  Doing better this am, headache is better. BP is better      Objective   Objective     Vital Signs:   Temp:  [98.3 °F (36.8 °C)-98.5 °F (36.9 °C)] 98.5 °F (36.9 °C)  Heart Rate:  [] 109  Resp:  [16-22] 16  BP: (135-237)/() 160/75  Flow (L/min) (Oxygen Therapy):  [2] 2     Physical Exam:  Constitutional: No acute distress, awake, alert  HENT: NCAT, mucous membranes moist  Respiratory: Clear to auscultation bilaterally, respiratory effort normal   Cardiovascular: RRR, no murmurs, rubs, or gallops  Gastrointestinal: Positive bowel sounds, soft, nontender, nondistended  Musculoskeletal: No bilateral ankle edema  Psychiatric: Appropriate affect, cooperative  Neurologic: Oriented x 3, strength symmetric in all extremities, Cranial Nerves grossly intact to confrontation, speech clear  Skin: No rashes     Results Reviewed:  LAB RESULTS:      Lab 25  1044 25  0917 25  0747   WBC  --   --  10.40   HEMOGLOBIN  --   --  14.2   HEMATOCRIT  --   --  41.1   PLATELETS  --   --  497*   NEUTROS ABS  --   --  7.21*   IMMATURE GRANS (ABS)  --   --  0.03   LYMPHS ABS  --   --  2.13   MONOS ABS  --   --  0.71   EOS ABS  --   --  0.27   MCV  --   --  85.6   SED RATE  --   --  53*   CRP  --  1.50*  --    PROCALCITONIN  --   --  0.08   LACTATE 1.8  --  3.2*   HSTROP T  --  25* 29*         Lab 25  0747   SODIUM 141   POTASSIUM 4.5   CHLORIDE 103   CO2 18.0*   ANION GAP 20.0*   BUN 13.6   CREATININE 0.97   EGFR 71.8   GLUCOSE 153*   CALCIUM 10.0   IONIZED CALCIUM 1.11*   MAGNESIUM 1.4*   TSH 9.890*         Lab 25  0747   TOTAL PROTEIN 8.5   ALBUMIN 4.7   GLOBULIN 3.8   ALT (SGPT) 11   AST (SGOT) 23   BILIRUBIN 0.4   ALK PHOS 112    LIPASE 13         Lab 06/09/25  0917 06/09/25  0747   PROBNP  --  1,104.0*   HSTROP T 25* 29*                 Brief Urine Lab Results  (Last result in the past 365 days)        Color   Clarity   Blood   Leuk Est   Nitrite   Protein   CREAT   Urine HCG        06/09/25 0738               Negative       06/09/25 0738 Yellow   Clear   Negative   Negative   Negative   30 mg/dL (1+)                   Microbiology Results Abnormal       None            MRI Brain Without Contrast  Result Date: 6/9/2025  MRI BRAIN WO CONTRAST Date of Exam: 6/9/2025 8:50 PM EDT Indication: Visual hallucinations, SBP >200 mmHg, vomiting, r/o PRES.  Comparison: CT of the head performed on the same date. Technique:  Routine multiplanar/multisequence sequence images of the brain were obtained without contrast administration. Findings: No area of restricted diffusion is identified to suggest an acute intracranial infarct. Multifocal areas of nonspecific increased T2/FLAIR signal predominantly within the periventricular white matter. The ventricles and sulci are normal in size  and configuration. No intracranial mass or mass effect. No extra-axial mass or collection. The posterior fossa is normal. Sellar and suprasellar structures are normal. The major intracranial flow-voids of the [place] are patent. Orbital and periorbital soft tissues are normal. Severe mucosal thickening of the left maxillary sinus. Mucoid retention cysts or polyps within the right maxillary sinus.     Impression: Impression: White matter changes likely demyelinating disease with differential considerations including sequela prior inflammation or infection, vasculitis, or chronic small vessel/microangiopathic ischemic changes Electronically Signed: Kyle Hurt MD  6/9/2025 9:20 PM EDT  Workstation ID: [id number]    CT Angiogram Chest  Result Date: 6/9/2025  CT ANGIOGRAM CHEST, CT ABDOMEN PELVIS W CONTRAST Date of Exam: 6/9/2025 11:00 AM EDT Indication:  Hallucinations, tachycardia, ill. Comparison: None available. Technique: CTA of the chest was performed after the uneventful intravenous administration of 85 mL Isovue-370. Reconstructed coronal and sagittal images were also obtained. In addition, a 3-D volume rendered image was created for interpretation. Automated exposure control and iterative reconstruction methods were used. FINDINGS: Thoracic inlet: Unremarkable. Pulmonary arteries: Suboptimal contrast bolus timing limit evaluation of the small peripheral pulmonary arteries. No large central pulmonary embolism is seen. Great vessels: The thoracic aorta and proximal arch vessels appear unremarkable. Mediastinum/Ivy: No pathologically enlarged mediastinal lymph nodes are seen. The esophagus is unremarkable. Lung parenchyma: The lungs appear adequately aerated. No acute consolidation is seen. No suspicious pulmonary nodules are seen. Trachea and airways: The trachea and central airways appear unremarkable. Pleural space: No significant pleural effusion or pneumothorax is seen. Heart and pericardium: The heart and pericardium appear unremarkable. Liver: The liver is prominent in size and decreased in attenuation. No focal liver lesion is seen. No biliary dilation is seen. Gallbladder: Surgically absent. Pancreas: Unremarkable. Spleen: Unremarkable. Adrenal glands: Unremarkable. Genitourinary tract: Peripelvic cysts noted within the left kidney. Kidneys are otherwise unremarkable. No hydronephrosis is seen. The visualized portions of the ureters and urinary bladder appear unremarkable. 5 cm mass associated with the right uterus,  likely a fibroid. Gastrointestinal tract: A few scattered colonic diverticula are seen. The hollow viscera appear otherwise unremarkable. There is no evidence of bowel obstruction. Appendix: The appendix is not identified. Other findings: Stranding within the small bowel mesenteric fat, nonspecific, possibly related to mesenteric  panniculitis. No free air or free fluid is identified. No pathologically enlarged lymph nodes are seen. The abdominal aorta and IVC appear unremarkable. Bones and soft tissues: No acute or suspicious osseous or soft tissue lesion is identified.     Impression: 1.Suboptimal contrast bolus timing limits evaluation of the small peripheral pulmonary arteries. No large central pulmonary embolism is seen. 2.No acute abnormality is identified within the chest, abdomen, or pelvis. 3.Enlarged, fatty liver. 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 5.Probable 5 cm fibroid within the right uterus. 6.Additional findings as detailed above. Electronically Signed: Dilshad North MD  6/9/2025 11:36 AM EDT  Workstation ID: [id number]    CT Abdomen Pelvis With Contrast  Result Date: 6/9/2025  CT ANGIOGRAM CHEST, CT ABDOMEN PELVIS W CONTRAST Date of Exam: 6/9/2025 11:00 AM EDT Indication: Hallucinations, tachycardia, ill. Comparison: None available. Technique: CTA of the chest was performed after the uneventful intravenous administration of 85 mL Isovue-370. Reconstructed coronal and sagittal images were also obtained. In addition, a 3-D volume rendered image was created for interpretation. Automated exposure control and iterative reconstruction methods were used. FINDINGS: Thoracic inlet: Unremarkable. Pulmonary arteries: Suboptimal contrast bolus timing limit evaluation of the small peripheral pulmonary arteries. No large central pulmonary embolism is seen. Great vessels: The thoracic aorta and proximal arch vessels appear unremarkable. Mediastinum/Ivy: No pathologically enlarged mediastinal lymph nodes are seen. The esophagus is unremarkable. Lung parenchyma: The lungs appear adequately aerated. No acute consolidation is seen. No suspicious pulmonary nodules are seen. Trachea and airways: The trachea and central airways appear unremarkable. Pleural space: No  significant pleural effusion or pneumothorax is seen. Heart and pericardium: The heart and pericardium appear unremarkable. Liver: The liver is prominent in size and decreased in attenuation. No focal liver lesion is seen. No biliary dilation is seen. Gallbladder: Surgically absent. Pancreas: Unremarkable. Spleen: Unremarkable. Adrenal glands: Unremarkable. Genitourinary tract: Peripelvic cysts noted within the left kidney. Kidneys are otherwise unremarkable. No hydronephrosis is seen. The visualized portions of the ureters and urinary bladder appear unremarkable. 5 cm mass associated with the right uterus,  likely a fibroid. Gastrointestinal tract: A few scattered colonic diverticula are seen. The hollow viscera appear otherwise unremarkable. There is no evidence of bowel obstruction. Appendix: The appendix is not identified. 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 No free air or free fluid is identified. No pathologically enlarged lymph nodes are seen. The abdominal aorta and IVC appear unremarkable. Bones and soft tissues: No acute or suspicious osseous or soft tissue lesion is identified.     Impression: 1.Suboptimal contrast bolus timing limits evaluation of the small peripheral pulmonary arteries. No large central pulmonary embolism is seen. 2.No acute abnormality is identified within the chest, abdomen, or pelvis. 3.Enlarged, fatty liver. 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 5.Probable 5 cm fibroid within the right uterus. 6.Additional findings as detailed above. Electronically Signed: Dilshad North MD  6/9/2025 11:36 AM EDT  Workstation ID: [id number]    XR Chest 1 View  Result Date: 6/9/2025  XR CHEST 1 VW Date of Exam: 6/9/2025 9:19 AM EDT Indication: elevated BNP Comparison: None available. Findings: Cardiomediastinal silhouette is unremarkable.  No  airspace disease, pneumothorax, nor pleural effusion. No acute osseous abnormality identified.     Impression: Impression: No acute cardiopulmonary process. Electronically Signed: Dale Combs MD  6/9/2025 9:41 AM EDT  Workstation ID: [id number]    CT Head Without Contrast  Result Date: 6/9/2025  CT HEAD WO CONTRAST Date of Exam: 6/9/2025 8:40 AM EDT Indication: ams. Comparison: None available. Technique: Axial CT images were obtained of the head without contrast administration.  Automated exposure control and iterative construction methods were used. Findings: No acute intracranial hemorrhage or extra-axial collection is identified. The ventricles appear normal in caliber, with no evidence of mass effect or midline shift. The basal cisterns appear patent. The gray-white differentiation appears preserved. The calvarium appear intact. There is moderate frothy paranasal sinus mucosal disease. The mastoid air cells are well-aerated.     Impression: Impression: 1.No acute intracranial process identified. 2.Moderate frothy paranasal sinus mucosal disease. Correlate for acute sinusitis. Electronically Signed: Sohail Reyes MD  6/9/2025 9:06 AM EDT  Workstation ID: [id number]          Current medications:  Scheduled Meds:levothyroxine, 75 mcg, Oral, Q AM  sodium chloride, 10 mL, Intravenous, Q12H  valsartan, 160 mg, Oral, Daily      Continuous Infusions:niCARdipine, 5-15 mg/hr, Last Rate: 12.5 mg/hr (06/10/25 0517)  sodium chloride, 100 mL/hr, Last Rate: 100 mL/hr (06/10/25 0216)      PRN Meds:.  acetaminophen **OR** acetaminophen **OR** acetaminophen    senna-docusate sodium **AND** polyethylene glycol **AND** bisacodyl **AND** bisacodyl    Calcium Replacement - Follow Nurse / BPA Driven Protocol    Magnesium Standard Dose Replacement - Follow Nurse / BPA Driven Protocol    melatonin    ondansetron    Phosphorus Replacement - Follow Nurse / BPA Driven Protocol    Potassium Replacement - Follow Nurse / BPA Driven  Protocol    prochlorperazine    promethazine    Sodium Chloride (PF)    sodium chloride    sodium chloride    temazepam    Assessment & Plan   Assessment & Plan     Active Hospital Problems    Diagnosis  POA    **Intractable vomiting with nausea [R11.2]  Yes    HTN (hypertension) [I10]  Yes    Rheumatoid arthritis [M06.9]  Yes    Hypertensive crisis [I16.9]  Yes      Resolved Hospital Problems   No resolved problems to display.        Brief Hospital Course to date:  Barbara Phillips is a 49 y.o. female w/ HTN, hypothyroid, rheumatoid/psoriatic arthritis, morbid obesity, holding her MTX for planned TKA and developed visual hallucinations ~4 days pta, then the night before arrival developed intractable N/V followed by a headache and was found to have BP of 237/139.    Plan:      Intractable N/V  Headache  Visual hallucinations  Hypertensive crisis  - preliminary initial labs/imaging w/o evidence for infectious process  - cont cardene gtt, cont home ARB  -- unable to wean gtt, add Coreg   - inflammatory markers (CRP/ESR) elevated (1.5/53, respectively)   - checked MRI to r/o PRES- no vasogenic edema noted, however, concern for demyelinating disease.  Will consult general Neuro.   - prn antiemetics  - IVF support  - single dose IV hydromorphone for severe headache (tylenol ineffective, can't have NSAIDS prior to planned TKA, and N/V preclude PO pain med)    ? Mesenteric Panniculitis  -- noted on CT A/P, however, no other s/s of active infection. Monitor off ABX for now      Hypothyroidism   - cont home levothyroxine    RA (reports rheumatoid/psoriatic arthritis)   - typically takes MTX on Fridays, last dose 5/30/25 in plan for surgery    Morbid Obesity  -- complicates all aspects of care     Total time spent: Time Spent: Time Spent: 35 minutes  Time spent includes time reviewing chart, face-to-face time, counseling patient/family/caregiver, ordering medications/tests/procedures, communicating with other health care  professionals, documenting clinical information in the electronic health record, and coordination of care.      Expected Discharge Location and Transportation: home once BP controlled on oral meds   Expected Discharge   Expected Discharge Date: 6/11/2025; Expected Discharge Time:      VTE Prophylaxis:  Mechanical VTE prophylaxis orders are present.         AM-PAC 6 Clicks Score (PT): 24 (06/10/25 0200)    CODE STATUS:   Code Status and Medical Interventions: CPR (Attempt to Resuscitate); Full Support   Ordered at: 06/09/25 1400     Code Status (Patient has no pulse and is not breathing):    CPR (Attempt to Resuscitate)     Medical Interventions (Patient has pulse or is breathing):    Full Support       Rocío Mehta MD  06/10/25

## 2025-06-11 ENCOUNTER — APPOINTMENT (OUTPATIENT)
Dept: GENERAL RADIOLOGY | Facility: HOSPITAL | Age: 50
DRG: 065 | End: 2025-06-11
Payer: COMMERCIAL

## 2025-06-11 ENCOUNTER — APPOINTMENT (OUTPATIENT)
Dept: CT IMAGING | Facility: HOSPITAL | Age: 50
DRG: 065 | End: 2025-06-11
Payer: COMMERCIAL

## 2025-06-11 PROBLEM — I16.1 HYPERTENSIVE EMERGENCY: Status: ACTIVE | Noted: 2025-06-11

## 2025-06-11 LAB
ANION GAP SERPL CALCULATED.3IONS-SCNC: 11 MMOL/L (ref 5–15)
APPEARANCE CSF: ABNORMAL
APPEARANCE CSF: ABNORMAL
BASOPHILS # BLD AUTO: 0.07 10*3/MM3 (ref 0–0.2)
BASOPHILS NFR BLD AUTO: 0.8 % (ref 0–1.5)
BUN SERPL-MCNC: 5.7 MG/DL (ref 6–20)
BUN/CREAT SERPL: 8.8 (ref 7–25)
C GATTII+NEOFOR DNA CSF QL NAA+NON-PROBE: NOT DETECTED
CALCIUM SPEC-SCNC: 8 MG/DL (ref 8.6–10.5)
CHLORIDE SERPL-SCNC: 107 MMOL/L (ref 98–107)
CHOLEST SERPL-MCNC: 276 MG/DL (ref 0–200)
CMV DNA CSF QL NAA+PROBE: NOT DETECTED
CO2 SERPL-SCNC: 22 MMOL/L (ref 22–29)
COLOR CSF: ABNORMAL
COLOR CSF: ABNORMAL
COLOR SPUN CSF: COLORLESS
COLOR SPUN CSF: COLORLESS
CREAT SERPL-MCNC: 0.65 MG/DL (ref 0.57–1)
DEPRECATED RDW RBC AUTO: 43.5 FL (ref 37–54)
E COLI K1 DNA CSF QL NAA+NON-PROBE: NOT DETECTED
EGFRCR SERPLBLD CKD-EPI 2021: 108.1 ML/MIN/1.73
EOSINOPHIL # BLD AUTO: 0.76 10*3/MM3 (ref 0–0.4)
EOSINOPHIL NFR BLD AUTO: 8.7 % (ref 0.3–6.2)
EOSINOPHIL NFR CSF MANUAL: 5 %
ERYTHROCYTE [DISTWIDTH] IN BLOOD BY AUTOMATED COUNT: 13.2 % (ref 12.3–15.4)
ERYTHROCYTE [SEDIMENTATION RATE] IN BLOOD: 26 MM/HR (ref 0–20)
EV RNA CSF QL NAA+PROBE: NOT DETECTED
GLUCOSE BLDC GLUCOMTR-MCNC: 109 MG/DL (ref 70–130)
GLUCOSE BLDC GLUCOMTR-MCNC: 115 MG/DL (ref 70–130)
GLUCOSE BLDC GLUCOMTR-MCNC: 125 MG/DL (ref 70–130)
GLUCOSE CSF-MCNC: 64 MG/DL (ref 40–70)
GLUCOSE SERPL-MCNC: 124 MG/DL (ref 65–99)
GP B STREP DNA SPEC QL NAA+PROBE: NOT DETECTED
HAEM INFLU SEROTYP DNA SPEC NAA+PROBE: NOT DETECTED
HBA1C MFR BLD: 6.3 % (ref 4.8–5.6)
HCT VFR BLD AUTO: 36.2 % (ref 34–46.6)
HDLC SERPL-MCNC: 32 MG/DL (ref 40–60)
HGB BLD-MCNC: 11.9 G/DL (ref 12–15.9)
HHV6 DNA CSF QL NAA+PROBE: NOT DETECTED
HOLD SPECIMEN: NORMAL
HSV1 DNA CSF QL NAA+PROBE: NOT DETECTED
HSV2 DNA CSF QL NAA+PROBE: NOT DETECTED
IMM GRANULOCYTES # BLD AUTO: 0.03 10*3/MM3 (ref 0–0.05)
IMM GRANULOCYTES NFR BLD AUTO: 0.3 % (ref 0–0.5)
L MONOCYTOG RRNA SPEC QL PROBE: NOT DETECTED
LDLC SERPL CALC-MCNC: 189 MG/DL (ref 0–100)
LDLC/HDLC SERPL: 5.88 {RATIO}
LYMPHOCYTES # BLD AUTO: 2.99 10*3/MM3 (ref 0.7–3.1)
LYMPHOCYTES NFR BLD AUTO: 34.2 % (ref 19.6–45.3)
LYMPHOCYTES NFR CSF MANUAL: 58 % (ref 62–100)
LYMPHOCYTES NFR CSF MANUAL: 66 % (ref 62–100)
MCH RBC QN AUTO: 29.9 PG (ref 26.6–33)
MCHC RBC AUTO-ENTMCNC: 32.9 G/DL (ref 31.5–35.7)
MCV RBC AUTO: 91 FL (ref 79–97)
MONOCYTES # BLD AUTO: 0.81 10*3/MM3 (ref 0.1–0.9)
MONOCYTES NFR BLD AUTO: 9.3 % (ref 5–12)
MONOCYTES NFR CSF MANUAL: 5 % (ref 0–36)
MONOCYTES NFR CSF MANUAL: 7 % (ref 0–36)
N MEN DNA SPEC QL NAA+PROBE: NOT DETECTED
NEUTROPHILS NFR BLD AUTO: 4.09 10*3/MM3 (ref 1.7–7)
NEUTROPHILS NFR BLD AUTO: 46.7 % (ref 42.7–76)
NEUTROPHILS NFR CSF MICRO: 24 % (ref 0–6)
NEUTROPHILS NFR CSF MICRO: 35 % (ref 0–6)
NRBC BLD AUTO-RTO: 0 /100 WBC (ref 0–0.2)
PARECHOVIRUS A RNA CSF QL NAA+NON-PROBE: NOT DETECTED
PLATELET # BLD AUTO: 429 10*3/MM3 (ref 140–450)
PMV BLD AUTO: 8.9 FL (ref 6–12)
POTASSIUM SERPL-SCNC: 3.6 MMOL/L (ref 3.5–5.2)
POTASSIUM SERPL-SCNC: 4.1 MMOL/L (ref 3.5–5.2)
PROT CSF-MCNC: 63.8 MG/DL (ref 15–45)
RBC # BLD AUTO: 3.98 10*6/MM3 (ref 3.77–5.28)
RBC # CSF MANUAL: 2000 /MM3 (ref 0–5)
RBC # CSF MANUAL: ABNORMAL /MM3 (ref 0–5)
S PNEUM DNA CSF QL NAA+NON-PROBE: NOT DETECTED
SODIUM SERPL-SCNC: 140 MMOL/L (ref 136–145)
SPECIMEN VOL CSF: 24.3 ML
SPECIMEN VOL CSF: 24.3 ML
TRIGL SERPL-MCNC: 279 MG/DL (ref 0–150)
TSH SERPL DL<=0.05 MIU/L-ACNC: 10.81 UIU/ML (ref 0.27–4.2)
TUBE # CSF: 1
TUBE # CSF: 4
VLDLC SERPL-MCNC: 55 MG/DL (ref 5–40)
VZV DNA CSF QL NAA+PROBE: NOT DETECTED
WBC # CSF MANUAL: 14 /MM3 (ref 0–5)
WBC # CSF MANUAL: 9 /MM3 (ref 0–5)
WBC NRBC COR # BLD AUTO: 8.75 10*3/MM3 (ref 3.4–10.8)

## 2025-06-11 PROCEDURE — 82042 OTHER SOURCE ALBUMIN QUAN EA: CPT | Performed by: STUDENT IN AN ORGANIZED HEALTH CARE EDUCATION/TRAINING PROGRAM

## 2025-06-11 PROCEDURE — 89051 BODY FLUID CELL COUNT: CPT | Performed by: STUDENT IN AN ORGANIZED HEALTH CARE EDUCATION/TRAINING PROGRAM

## 2025-06-11 PROCEDURE — 009U3ZX DRAINAGE OF SPINAL CANAL, PERCUTANEOUS APPROACH, DIAGNOSTIC: ICD-10-PCS | Performed by: STUDENT IN AN ORGANIZED HEALTH CARE EDUCATION/TRAINING PROGRAM

## 2025-06-11 PROCEDURE — 80048 BASIC METABOLIC PNL TOTAL CA: CPT | Performed by: INTERNAL MEDICINE

## 2025-06-11 PROCEDURE — 83873 ASSAY OF CSF PROTEIN: CPT | Performed by: STUDENT IN AN ORGANIZED HEALTH CARE EDUCATION/TRAINING PROGRAM

## 2025-06-11 PROCEDURE — 25010000002 NICARDIPINE 2.5 MG/ML SOLUTION 10 ML VIAL: Performed by: NURSE PRACTITIONER

## 2025-06-11 PROCEDURE — 99233 SBSQ HOSP IP/OBS HIGH 50: CPT | Performed by: STUDENT IN AN ORGANIZED HEALTH CARE EDUCATION/TRAINING PROGRAM

## 2025-06-11 PROCEDURE — B01B1ZZ FLUOROSCOPY OF SPINAL CORD USING LOW OSMOLAR CONTRAST: ICD-10-PCS | Performed by: STUDENT IN AN ORGANIZED HEALTH CARE EDUCATION/TRAINING PROGRAM

## 2025-06-11 PROCEDURE — 70498 CT ANGIOGRAPHY NECK: CPT

## 2025-06-11 PROCEDURE — 85652 RBC SED RATE AUTOMATED: CPT | Performed by: STUDENT IN AN ORGANIZED HEALTH CARE EDUCATION/TRAINING PROGRAM

## 2025-06-11 PROCEDURE — 87483 CNS DNA AMP PROBE TYPE 12-25: CPT | Performed by: STUDENT IN AN ORGANIZED HEALTH CARE EDUCATION/TRAINING PROGRAM

## 2025-06-11 PROCEDURE — 84132 ASSAY OF SERUM POTASSIUM: CPT | Performed by: INTERNAL MEDICINE

## 2025-06-11 PROCEDURE — 25810000003 SODIUM CHLORIDE 0.9 % SOLUTION 250 ML FLEX CONT: Performed by: NURSE PRACTITIONER

## 2025-06-11 PROCEDURE — 82784 ASSAY IGA/IGD/IGG/IGM EACH: CPT | Performed by: STUDENT IN AN ORGANIZED HEALTH CARE EDUCATION/TRAINING PROGRAM

## 2025-06-11 PROCEDURE — 99232 SBSQ HOSP IP/OBS MODERATE 35: CPT | Performed by: INTERNAL MEDICINE

## 2025-06-11 PROCEDURE — 25010000002 LIDOCAINE 1 % SOLUTION: Performed by: INTERNAL MEDICINE

## 2025-06-11 PROCEDURE — 84443 ASSAY THYROID STIM HORMONE: CPT | Performed by: STUDENT IN AN ORGANIZED HEALTH CARE EDUCATION/TRAINING PROGRAM

## 2025-06-11 PROCEDURE — 25510000001 IOPAMIDOL PER 1 ML: Performed by: INTERNAL MEDICINE

## 2025-06-11 PROCEDURE — 25010000002 PROCHLORPERAZINE 10 MG/2ML SOLUTION: Performed by: INTERNAL MEDICINE

## 2025-06-11 PROCEDURE — 70496 CT ANGIOGRAPHY HEAD: CPT

## 2025-06-11 PROCEDURE — A9577 INJ MULTIHANCE: HCPCS | Performed by: INTERNAL MEDICINE

## 2025-06-11 PROCEDURE — 25810000003 SODIUM CHLORIDE 0.9 % SOLUTION: Performed by: INTERNAL MEDICINE

## 2025-06-11 PROCEDURE — 80061 LIPID PANEL: CPT | Performed by: STUDENT IN AN ORGANIZED HEALTH CARE EDUCATION/TRAINING PROGRAM

## 2025-06-11 PROCEDURE — 82945 GLUCOSE OTHER FLUID: CPT | Performed by: STUDENT IN AN ORGANIZED HEALTH CARE EDUCATION/TRAINING PROGRAM

## 2025-06-11 PROCEDURE — 84157 ASSAY OF PROTEIN OTHER: CPT | Performed by: STUDENT IN AN ORGANIZED HEALTH CARE EDUCATION/TRAINING PROGRAM

## 2025-06-11 PROCEDURE — 25010000002 DIAZEPAM PER 5 MG: Performed by: STUDENT IN AN ORGANIZED HEALTH CARE EDUCATION/TRAINING PROGRAM

## 2025-06-11 PROCEDURE — 85025 COMPLETE CBC W/AUTO DIFF WBC: CPT | Performed by: INTERNAL MEDICINE

## 2025-06-11 PROCEDURE — 83036 HEMOGLOBIN GLYCOSYLATED A1C: CPT | Performed by: STUDENT IN AN ORGANIZED HEALTH CARE EDUCATION/TRAINING PROGRAM

## 2025-06-11 PROCEDURE — 25510000002 GADOBENATE DIMEGLUMINE 529 MG/ML SOLUTION: Performed by: INTERNAL MEDICINE

## 2025-06-11 PROCEDURE — 83916 OLIGOCLONAL BANDS: CPT | Performed by: STUDENT IN AN ORGANIZED HEALTH CARE EDUCATION/TRAINING PROGRAM

## 2025-06-11 PROCEDURE — 82948 REAGENT STRIP/BLOOD GLUCOSE: CPT

## 2025-06-11 PROCEDURE — 82040 ASSAY OF SERUM ALBUMIN: CPT | Performed by: STUDENT IN AN ORGANIZED HEALTH CARE EDUCATION/TRAINING PROGRAM

## 2025-06-11 RX ORDER — ASPIRIN 325 MG
325 TABLET ORAL DAILY
Status: DISCONTINUED | OUTPATIENT
Start: 2025-06-11 | End: 2025-06-13 | Stop reason: HOSPADM

## 2025-06-11 RX ORDER — BISACODYL 10 MG
10 SUPPOSITORY, RECTAL RECTAL DAILY PRN
Status: DISCONTINUED | OUTPATIENT
Start: 2025-06-11 | End: 2025-06-13 | Stop reason: HOSPADM

## 2025-06-11 RX ORDER — DIAZEPAM 10 MG/2ML
5 INJECTION, SOLUTION INTRAMUSCULAR; INTRAVENOUS ONCE
Status: COMPLETED | OUTPATIENT
Start: 2025-06-11 | End: 2025-06-11

## 2025-06-11 RX ORDER — ASPIRIN 300 MG/1
300 SUPPOSITORY RECTAL DAILY
Status: DISCONTINUED | OUTPATIENT
Start: 2025-06-11 | End: 2025-06-13 | Stop reason: HOSPADM

## 2025-06-11 RX ORDER — SODIUM CHLORIDE 9 MG/ML
40 INJECTION, SOLUTION INTRAVENOUS AS NEEDED
Status: DISCONTINUED | OUTPATIENT
Start: 2025-06-11 | End: 2025-06-13 | Stop reason: HOSPADM

## 2025-06-11 RX ORDER — ACETAMINOPHEN 650 MG/1
650 SUPPOSITORY RECTAL EVERY 4 HOURS PRN
Status: DISCONTINUED | OUTPATIENT
Start: 2025-06-11 | End: 2025-06-13 | Stop reason: HOSPADM

## 2025-06-11 RX ORDER — LIDOCAINE HYDROCHLORIDE 10 MG/ML
10 INJECTION, SOLUTION INFILTRATION; PERINEURAL ONCE
Status: COMPLETED | OUTPATIENT
Start: 2025-06-11 | End: 2025-06-11

## 2025-06-11 RX ORDER — CARVEDILOL 12.5 MG/1
12.5 TABLET ORAL 2 TIMES DAILY WITH MEALS
Status: DISCONTINUED | OUTPATIENT
Start: 2025-06-11 | End: 2025-06-11

## 2025-06-11 RX ORDER — HYDROCODONE BITARTRATE AND ACETAMINOPHEN 5; 325 MG/1; MG/1
1 TABLET ORAL ONCE
Refills: 0 | Status: COMPLETED | OUTPATIENT
Start: 2025-06-11 | End: 2025-06-11

## 2025-06-11 RX ORDER — ALUMINA, MAGNESIA, AND SIMETHICONE 2400; 2400; 240 MG/30ML; MG/30ML; MG/30ML
7.5 SUSPENSION ORAL EVERY 4 HOURS PRN
Status: DISCONTINUED | OUTPATIENT
Start: 2025-06-11 | End: 2025-06-13 | Stop reason: HOSPADM

## 2025-06-11 RX ORDER — ACETAMINOPHEN 325 MG/1
650 TABLET ORAL EVERY 4 HOURS PRN
Status: DISCONTINUED | OUTPATIENT
Start: 2025-06-11 | End: 2025-06-13 | Stop reason: HOSPADM

## 2025-06-11 RX ORDER — SODIUM CHLORIDE 0.9 % (FLUSH) 0.9 %
10 SYRINGE (ML) INJECTION AS NEEDED
Status: DISCONTINUED | OUTPATIENT
Start: 2025-06-11 | End: 2025-06-13 | Stop reason: HOSPADM

## 2025-06-11 RX ORDER — IOPAMIDOL 755 MG/ML
75 INJECTION, SOLUTION INTRAVASCULAR
Status: COMPLETED | OUTPATIENT
Start: 2025-06-11 | End: 2025-06-11

## 2025-06-11 RX ORDER — AMLODIPINE BESYLATE 5 MG/1
5 TABLET ORAL
Status: DISCONTINUED | OUTPATIENT
Start: 2025-06-11 | End: 2025-06-12

## 2025-06-11 RX ORDER — VALSARTAN 160 MG/1
320 TABLET ORAL DAILY
Status: DISCONTINUED | OUTPATIENT
Start: 2025-06-11 | End: 2025-06-13 | Stop reason: HOSPADM

## 2025-06-11 RX ORDER — POTASSIUM CHLORIDE 1500 MG/1
40 TABLET, EXTENDED RELEASE ORAL EVERY 4 HOURS
Status: COMPLETED | OUTPATIENT
Start: 2025-06-11 | End: 2025-06-11

## 2025-06-11 RX ORDER — SODIUM CHLORIDE 0.9 % (FLUSH) 0.9 %
10 SYRINGE (ML) INJECTION EVERY 12 HOURS SCHEDULED
Status: DISCONTINUED | OUTPATIENT
Start: 2025-06-11 | End: 2025-06-13 | Stop reason: HOSPADM

## 2025-06-11 RX ORDER — ONDANSETRON 2 MG/ML
4 INJECTION INTRAMUSCULAR; INTRAVENOUS EVERY 6 HOURS PRN
Status: DISCONTINUED | OUTPATIENT
Start: 2025-06-11 | End: 2025-06-13 | Stop reason: HOSPADM

## 2025-06-11 RX ORDER — ATORVASTATIN CALCIUM 40 MG/1
80 TABLET, FILM COATED ORAL NIGHTLY
Status: DISCONTINUED | OUTPATIENT
Start: 2025-06-11 | End: 2025-06-13 | Stop reason: HOSPADM

## 2025-06-11 RX ADMIN — ACETAMINOPHEN 650 MG: 325 TABLET ORAL at 21:07

## 2025-06-11 RX ADMIN — Medication 10 ML: at 09:25

## 2025-06-11 RX ADMIN — HYDROCODONE BITARTRATE AND ACETAMINOPHEN 1 TABLET: 5; 325 TABLET ORAL at 22:42

## 2025-06-11 RX ADMIN — ACETAMINOPHEN 650 MG: 325 TABLET ORAL at 06:24

## 2025-06-11 RX ADMIN — Medication 10 ML: at 22:43

## 2025-06-11 RX ADMIN — NICARDIPINE HYDROCHLORIDE 10 MG/HR: 25 INJECTION, SOLUTION INTRAVENOUS at 04:13

## 2025-06-11 RX ADMIN — LEVOTHYROXINE SODIUM 75 MCG: 0.03 TABLET ORAL at 06:24

## 2025-06-11 RX ADMIN — HYDROCODONE BITARTRATE AND ACETAMINOPHEN 1 TABLET: 5; 325 TABLET ORAL at 16:38

## 2025-06-11 RX ADMIN — Medication 5 MG: at 22:42

## 2025-06-11 RX ADMIN — NICARDIPINE HYDROCHLORIDE 7.5 MG/HR: 25 INJECTION, SOLUTION INTRAVENOUS at 08:28

## 2025-06-11 RX ADMIN — AMLODIPINE BESYLATE 5 MG: 5 TABLET ORAL at 09:22

## 2025-06-11 RX ADMIN — LIDOCAINE HYDROCHLORIDE 10 ML: 10 INJECTION, SOLUTION INFILTRATION; PERINEURAL at 14:48

## 2025-06-11 RX ADMIN — SODIUM CHLORIDE 100 ML/HR: 9 INJECTION, SOLUTION INTRAVENOUS at 09:25

## 2025-06-11 RX ADMIN — ASPIRIN 325 MG: 325 TABLET ORAL at 12:11

## 2025-06-11 RX ADMIN — ATORVASTATIN CALCIUM 80 MG: 40 TABLET, FILM COATED ORAL at 21:05

## 2025-06-11 RX ADMIN — PROCHLORPERAZINE EDISYLATE 5 MG: 5 INJECTION INTRAMUSCULAR; INTRAVENOUS at 22:42

## 2025-06-11 RX ADMIN — ACETAMINOPHEN 650 MG: 325 TABLET ORAL at 12:11

## 2025-06-11 RX ADMIN — IOPAMIDOL 75 ML: 755 INJECTION, SOLUTION INTRAVENOUS at 15:18

## 2025-06-11 RX ADMIN — POTASSIUM CHLORIDE 40 MEQ: 1500 TABLET, EXTENDED RELEASE ORAL at 12:11

## 2025-06-11 RX ADMIN — VALSARTAN 320 MG: 160 TABLET, FILM COATED ORAL at 09:21

## 2025-06-11 RX ADMIN — DIAZEPAM 5 MG: 10 INJECTION, SOLUTION INTRAMUSCULAR; INTRAVENOUS at 13:24

## 2025-06-11 RX ADMIN — GADOBENATE DIMEGLUMINE 20 ML: 529 INJECTION, SOLUTION INTRAVENOUS at 00:51

## 2025-06-11 RX ADMIN — HYDROCODONE BITARTRATE AND ACETAMINOPHEN 1 TABLET: 5; 325 TABLET ORAL at 01:34

## 2025-06-11 RX ADMIN — POTASSIUM CHLORIDE 40 MEQ: 1500 TABLET, EXTENDED RELEASE ORAL at 09:22

## 2025-06-11 NOTE — PROGRESS NOTES
[facility] Neurology    Progress Note    Patient Name: Barbara Phillips  : 1975  MRN: [id number]  Primary Care Physician:  Matilda Almendarez MD  Date of admission: 2025    Subjective     Reason for consult: MRI concerning for demyelinating process versus vasculitis     History of Present Illness   Was placed on a Cardene drip overnight.  Feels better today    Review of Systems   General: Negative for fever, nausea, or vomiting.   Neurological: Negative for headache, pain, or weakness.     Objective     Vitals:   Temp:  [98.1 °F (36.7 °C)-98.7 °F (37.1 °C)] 98.1 °F (36.7 °C)  Heart Rate:  [] 90  Resp:  [18] 18  BP: (116-191)/() 133/75  Flow (L/min) (Oxygen Therapy):  [2] 2    Neurologic Exam   Mental status/Cognition: Awake and alert  Speech/language: fluent    Cranial nerves: Tracks examiner. Face appears symmetric. No dysarthria.      Motor: Moves all extremities        Current Medications    Current Facility-Administered Medications:     acetaminophen (TYLENOL) tablet 650 mg, 650 mg, Oral, Q4H PRN, 650 mg at 25 0624 **OR** acetaminophen (TYLENOL) 160 MG/5ML oral solution 650 mg, 650 mg, Oral, Q4H PRN **OR** acetaminophen (TYLENOL) suppository 650 mg, 650 mg, Rectal, Q4H PRN, Tutu Gay,     amLODIPine (NORVASC) tablet 5 mg, 5 mg, Oral, Q24H, Olga Lidia Baeza II, DO    sennosides-docusate (PERICOLACE) 8.6-50 MG per tablet 2 tablet, 2 tablet, Oral, BID PRN **AND** polyethylene glycol (MIRALAX) packet 17 g, 17 g, Oral, Daily PRN **AND** bisacodyl (DULCOLAX) EC tablet 5 mg, 5 mg, Oral, Daily PRN **AND** bisacodyl (DULCOLAX) suppository 10 mg, 10 mg, Rectal, Daily PRN, Tutu Gay DO    Calcium Replacement - Follow Nurse / BPA Driven Protocol, , Not Applicable, PRN, Tutu Gay,     HYDROcodone-acetaminophen (NORCO) 5-325 MG per tablet 1 tablet, 1 tablet, Oral, Q12H PRN, Rocío Mehta MD, 1 tablet at 25 0134    levothyroxine (SYNTHROID,  LEVOTHROID) tablet 75 mcg, 75 mcg, Oral, Q AM, Tutu Gay, , 75 mcg at 06/11/25 0624    Magnesium Standard Dose Replacement - Follow Nurse / BPA Driven Protocol, , Not Applicable, PRN, Tutu Gay,     melatonin tablet 5 mg, 5 mg, Oral, Nightly PRN, Tutu Gay, , 5 mg at 06/10/25 2101    niCARdipine (CARDENE) 25mg in 250mL NS infusion, 5-15 mg/hr, Intravenous, Titrated, Aston, Nancy RYDER, APRN, Last Rate: 75 mL/hr at 06/11/25 0828, 7.5 mg/hr at 06/11/25 0828    ondansetron (ZOFRAN) injection 4 mg, 4 mg, Intravenous, Q6H PRN, Tutu aGy, , 4 mg at 06/10/25 0212    Phosphorus Replacement - Follow Nurse / BPA Driven Protocol, , Not Applicable, PRN, Tutu Gay,     potassium chloride (KLOR-CON M20) CR tablet 40 mEq, 40 mEq, Oral, Q4H, Olga Lidia Baeza II, DO    Potassium Replacement - Follow Nurse / BPA Driven Protocol, , Not Applicable, PRN, Tutu Gay,     prochlorperazine (COMPAZINE) injection 5 mg, 5 mg, Intravenous, Q6H PRN, Tutu Gay, , 5 mg at 06/09/25 1505    promethazine (PHENERGAN) 12.5 mg in sodium chloride 0.9 % 50 mL, 12.5 mg, Intravenous, Q6H PRN, Tutu Gay DO, 12.5 mg at 06/09/25 1523    Sodium Chloride (PF) 0.9 % 10 mL, 10 mL, Intravenous, PRN, Dale Nick MD    sodium chloride 0.9 % flush 10 mL, 10 mL, Intravenous, Q12H, Tutu Gay DO, 10 mL at 06/10/25 2052    sodium chloride 0.9 % flush 10 mL, 10 mL, Intravenous, PRN, Tutu Gay,     sodium chloride 0.9 % infusion 40 mL, 40 mL, Intravenous, PRN, Tutu Gay,     sodium chloride 0.9 % infusion, 100 mL/hr, Intravenous, Continuous, Tutu Gay DO, Last Rate: 100 mL/hr at 06/10/25 2144, 100 mL/hr at 06/10/25 2144    temazepam (RESTORIL) capsule 15 mg, 15 mg, Oral, Nightly PRN, Tutu Gay DO    valsartan (DIOVAN) tablet 320 mg, 320 mg, Oral, Daily, Olga Lidia Baeza II, DO    Laboratory Results:   Lab  "Results   Component Value Date    GLUCOSE 124 (H) 06/11/2025    CALCIUM 8.0 (L) 06/11/2025     06/11/2025    K 3.6 06/11/2025    CO2 22.0 06/11/2025     06/11/2025    BUN 5.7 (L) 06/11/2025    CREATININE 0.65 06/11/2025    BCR 8.8 06/11/2025    ANIONGAP 11.0 06/11/2025     Lab Results   Component Value Date    WBC 8.75 06/11/2025    HGB 11.9 (L) 06/11/2025    HCT 36.2 06/11/2025    MCV 91.0 06/11/2025     06/11/2025     No results found for: \"CHOL\"  No results found for: \"HDL\"  No results found for: \"LDL\"  No results found for: \"TRIG\"  Lab Results   Component Value Date    HGBA1C 6.00 (H) 10/17/2024     No results found for: \"FOLATE\"  No results found for: \"NDFDNFGD18\"    Blood culture NGTD     TSH 9.89  Free T4 0.94     CRP 1.5  Lactate 1.8 down from 3.2  Pro-Kvng 0.08  Sed rate 53     UA noninfectious    Images/Procedures   CT head 6/9/2025  No acute findings     MRI brain without contrast 6/9/2025  White matter changes likely demyelination disease with differential considerations including sequelae of prior inflammation or infection, vasculitis, chronic small vessel ischemic change    MRI brain with and without contrast 6/11/2025  Small focus of relative diffusion restriction in the left frontotemporal white matter with subtle enhancement.  Demyelination versus subacute infarct.  Numerous scattered FLAIR hyperintensities possibly from demyelinating disease or chronic small vessel disease    MRI cervical spine with and without contrast 6/11/2025  No evidence of demyelinating disease    Assessment / Plan   Active Hospital Problems:  Concern for demyelination versus vasculitis versus chronic small vessel disease   Suspect subacute left frontotemporal infarct    Brief Patient Summary:  Barbara Phillips is a 49 y.o. female with history of  rheumatoid arthritis, hypertension, hypothyroidism who presented to [facility] ED on 6/9 with vomiting, shaking and leg pain.  Neurology was consulted after MRI brain was " obtained to rule out PRES that showed possible demyelination.  Patient denies prior or current neurologic symptoms.  Her exam is unremarkable.    Initial MRI showed findings concerning for demyelination.  Repeated MRI with contrast which showed an area of diffusion restriction concerning for subacute infarct.  Recommend further stroke workup with CTA's, echo, A1c and lipids.  Right cervical spine showed no areas of demyelination.  Will go ahead and start her on aspirin and atorvastatin.  She is now agreeable to lumbar puncture, we will go ahead and obtain this while she is inpatient.     Plan:   - CTA brain and neck  - Echocardiogram  - Start aspirin 81 mg and atorvastatin 80 mg  - Check hemoglobin A1c, lipids  - Lumbar puncture with IR  - Okay to give a one-time dose of IV Valium 5 mg prior to lumbar puncture    I have discussed the above with the patient, family, bedside RN, hospitalist  Time spent with patient: 45 minutes in face-to-face evaluation and management of the patient.      Vidal Weldon DO, MS

## 2025-06-11 NOTE — PROGRESS NOTES
[facility] Medicine Services  PROGRESS NOTE    Patient Name: Barbara Phillips  : 1975  MRN: [id number]    Date of Admission: 2025  Primary Care Physician: Matilda Almendarez MD    Subjective   Subjective     CC: f/u AMS    HPI: Up in bed. Feels much better. No hallucinations, n/v. Placed back on IV cardene overnight.      Objective   Objective     Vital Signs:   Temp:  [98.1 °F (36.7 °C)-98.7 °F (37.1 °C)] 98.1 °F (36.7 °C)  Heart Rate:  [] 97  Resp:  [18] 18  BP: (116-191)/() 141/68  Flow (L/min) (Oxygen Therapy):  [2] 2     Physical Exam:  Constitutional: No acute distress, awake, alert  HENT: NCAT, mucous membranes moist  Respiratory: Clear to auscultation bilaterally, respiratory effort normal   Cardiovascular: RRR, no murmurs, rubs, or gallops  Gastrointestinal: Positive bowel sounds, soft, nontender, nondistended  Musculoskeletal: No bilateral ankle edema  Psychiatric: Appropriate affect, cooperative  Neurologic: Oriented x 3, strength symmetric in all extremities, Cranial Nerves grossly intact to confrontation, speech clear  Skin: No rashes     Results Reviewed:  LAB RESULTS:      Lab 25  0445 06/10/25  0447 25  1044 25  0917 25  0747   WBC 8.75 9.47  --   --  10.40   HEMOGLOBIN 11.9* 12.3  --   --  14.2   HEMATOCRIT 36.2 37.8  --   --  41.1   PLATELETS 429 403  --   --  497*   NEUTROS ABS 4.09 5.00  --   --  7.21*   IMMATURE GRANS (ABS) 0.03 0.02  --   --  0.03   LYMPHS ABS 2.99 2.88  --   --  2.13   MONOS ABS 0.81 0.93*  --   --  0.71   EOS ABS 0.76* 0.57*  --   --  0.27   MCV 91.0 91.5  --   --  85.6   SED RATE  --   --   --   --  53*   CRP  --   --   --  1.50*  --    PROCALCITONIN  --   --   --   --  0.08   LACTATE  --   --  1.8  --  3.2*   HSTROP T  --   --   --  25* 29*         Lab 25  0445 06/10/25  1157 25  0747   SODIUM 140 142 141   POTASSIUM 3.6 3.8 4.5   CHLORIDE 107 107 103   CO2 22.0 23.0 18.0*   ANION GAP  11.0 12.0 20.0*   BUN 5.7* 6.2 13.6   CREATININE 0.65 0.69 0.97   EGFR 108.1 106.5 71.8   GLUCOSE 124* 116* 153*   CALCIUM 8.0* 8.3* 10.0   IONIZED CALCIUM  --   --  1.11*   MAGNESIUM  --  1.7 1.4*   TSH  --   --  9.890*         Lab 06/09/25  0747   TOTAL PROTEIN 8.5   ALBUMIN 4.7   GLOBULIN 3.8   ALT (SGPT) 11   AST (SGOT) 23   BILIRUBIN 0.4   ALK PHOS 112   LIPASE 13         Lab 06/09/25  0917 06/09/25  0747   PROBNP  --  1,104.0*   HSTROP T 25* 29*                 Brief Urine Lab Results  (Last result in the past 365 days)        Color   Clarity   Blood   Leuk Est   Nitrite   Protein   CREAT   Urine HCG        06/09/25 0738               Negative       06/09/25 0738 Yellow   Clear   Negative   Negative   Negative   30 mg/dL (1+)                   Microbiology Results Abnormal       None            MRI Cervical Spine With & Without Contrast  Result Date: 6/11/2025  MRI CERVICAL SPINE W WO CONTRAST Date of Exam: 6/11/2025 12:03 AM EDT Indication: MS danica.  Comparison: None available. Technique:  Routine multiplanar/multisequence sequence images of the cervical spine were obtained before and after the uneventful administration of 20 mL Multihance.  Findings: Seven cervical vertebrae are identified. Alignment is anatomic. Disc spaces maintain normal height. Vertebral bodies maintain normal height.  There is no focal bone marrow edema. There is no evidence of a marrow replacing process. No acute osseous abnormalities. Spinal cord signal is normal. Paraspinal musculature is grossly preserved. There is no evidence of cervical lymphadenopathy or a neck mass. The craniocervical junction appears within normal limits. Limited view of the posterior fossa contents is unremarkable. No abnormal enhancement. C2-C3: The posterior disc is unremarkable. The uncovertebral joints appear within normal limits. Mild left facet hypertrophy. No significant neural foraminal or spinal canal stenosis. C3-C4: The posterior disc is  unremarkable. The uncovertebral joints appear within normal limits. Mild left facet hypertrophy. No significant neural foraminal or spinal canal stenosis. C4-C5: The posterior disc is unremarkable. The uncovertebral joints appear within normal limits. No significant facet arthropathy. No significant neural foraminal or spinal canal stenosis. C5-C6: The posterior disc is unremarkable. The uncovertebral joints appear within normal limits. No significant facet arthropathy. No significant neural foraminal or spinal canal stenosis. C6-C7: The posterior disc is unremarkable. The uncovertebral joints appear within normal limits. No significant facet arthropathy. No significant neural foraminal or spinal canal stenosis. C7-T1: The posterior disc is unremarkable. No significant facet arthropathy. No significant neural foraminal or spinal canal stenosis.     Impression: Impression: 1.No evidence of demyelinating disease in the cervical spine. 2.Mild left-sided facet arthritis. Electronically Signed: Tutu Phillips MD  6/11/2025 3:38 AM EDT  Workstation ID: [id number]    MRI Brain With & Without Contrast  Result Date: 6/11/2025  MRI BRAIN W WO CONTRAST Date of Exam: 6/11/2025 12:03 AM EDT Indication: follow up contrasted portion of possible demyelination seen on noncontrasted MRI. MS protocol with saggital FLAIR.  Comparison: None available. Technique:  Routine multiplanar/multisequence sequence images of the brain were obtained before and after the uneventful administration of 20 mL Multihance. Findings: There is a small ovoid focus of relative diffusion restriction again noted in the left frontoparietal white matter. It measures approximately 8 mm in diameter. There is subtle enhancement. This may relate to a focus of demyelination or subacute lacunar infarct. There are numerous scattered FLAIR hyperintensities within the cerebral white matter. This is predominantly periventricular, but there are numerous subcortical foci, as  well. There is no evidence of an acute infarct. The pattern of white matter disease could be from demyelinating disease although chronic small vessel ischemic change is certainly possible. Cerebral cortex appears intact. There is a small focus of FLAIR hyperintense signal within the left putamen. Cerebellum and brainstem appear unremarkable. Major arterial flow voids appear intact. There is severe left and mild right maxillary sinus mucosal disease. Mastoid air cells appear well aerated. Orbits are symmetric. Optic nerves are normal. The midline structures appear intact. Calvarial and superficial soft tissue signal is within normal limits.     Impression: Impression: 1.Small focus of relative diffusion restriction in the left frontoparietal white matter with subtle enhancement. This could relate to a focus of demyelination or subacute lacunar infarct. 2.Numerous scattered FLAIR hyperintensities in the cerebral white matter. This could be from demyelinating disease or chronic small vessel ischemic change. 3.Severe left and mild right maxillary sinus mucosal disease. Electronically Signed: Tutu Phillips MD  6/11/2025 3:21 AM EDT  Workstation ID: [id number]    MRI Brain Without Contrast  Result Date: 6/9/2025  MRI BRAIN WO CONTRAST Date of Exam: 6/9/2025 8:50 PM EDT Indication: Visual hallucinations, SBP >200 mmHg, vomiting, r/o PRES.  Comparison: CT of the head performed on the same date. Technique:  Routine multiplanar/multisequence sequence images of the brain were obtained without contrast administration. Findings: No area of restricted diffusion is identified to suggest an acute intracranial infarct. Multifocal areas of nonspecific increased T2/FLAIR signal predominantly within the periventricular white matter. The ventricles and sulci are normal in size  and configuration. No intracranial mass or mass effect. No extra-axial mass or collection. The posterior fossa is normal. Sellar and suprasellar structures are  normal. The major intracranial flow-voids of the [place] are patent. Orbital and periorbital soft tissues are normal. Severe mucosal thickening of the left maxillary sinus. Mucoid retention cysts or polyps within the right maxillary sinus.     Impression: Impression: White matter changes likely demyelinating disease with differential considerations including sequela prior inflammation or infection, vasculitis, or chronic small vessel/microangiopathic ischemic changes Electronically Signed: Kyle Hurt MD  6/9/2025 9:20 PM EDT  Workstation ID: [id number]    CT Angiogram Chest  Result Date: 6/9/2025  CT ANGIOGRAM CHEST, CT ABDOMEN PELVIS W CONTRAST Date of Exam: 6/9/2025 11:00 AM EDT Indication: Hallucinations, tachycardia, ill. Comparison: None available. Technique: CTA of the chest was performed after the uneventful intravenous administration of 85 mL Isovue-370. Reconstructed coronal and sagittal images were also obtained. In addition, a 3-D volume rendered image was created for interpretation. Automated exposure control and iterative reconstruction methods were used. FINDINGS: Thoracic inlet: Unremarkable. Pulmonary arteries: Suboptimal contrast bolus timing limit evaluation of the small peripheral pulmonary arteries. No large central pulmonary embolism is seen. Great vessels: The thoracic aorta and proximal arch vessels appear unremarkable. Mediastinum/Ivy: No pathologically enlarged mediastinal lymph nodes are seen. The esophagus is unremarkable. Lung parenchyma: The lungs appear adequately aerated. No acute consolidation is seen. No suspicious pulmonary nodules are seen. Trachea and airways: The trachea and central airways appear unremarkable. Pleural space: No significant pleural effusion or pneumothorax is seen. Heart and pericardium: The heart and pericardium appear unremarkable. Liver: The liver is prominent in size and decreased in attenuation. No focal liver lesion is seen. No biliary dilation is  seen. Gallbladder: Surgically absent. Pancreas: Unremarkable. Spleen: Unremarkable. Adrenal glands: Unremarkable. Genitourinary tract: Peripelvic cysts noted within the left kidney. Kidneys are otherwise unremarkable. No hydronephrosis is seen. The visualized portions of the ureters and urinary bladder appear unremarkable. 5 cm mass associated with the right uterus,  likely a fibroid. Gastrointestinal tract: A few scattered colonic diverticula are seen. The hollow viscera appear otherwise unremarkable. There is no evidence of bowel obstruction. Appendix: The appendix is not identified. 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 No free air or free fluid is identified. No pathologically enlarged lymph nodes are seen. The abdominal aorta and IVC appear unremarkable. Bones and soft tissues: No acute or suspicious osseous or soft tissue lesion is identified.     Impression: 1.Suboptimal contrast bolus timing limits evaluation of the small peripheral pulmonary arteries. No large central pulmonary embolism is seen. 2.No acute abnormality is identified within the chest, abdomen, or pelvis. 3.Enlarged, fatty liver. 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 5.Probable 5 cm fibroid within the right uterus. 6.Additional findings as detailed above. Electronically Signed: Dilshad North MD  6/9/2025 11:36 AM EDT  Workstation ID: [id number]    CT Abdomen Pelvis With Contrast  Result Date: 6/9/2025  CT ANGIOGRAM CHEST, CT ABDOMEN PELVIS W CONTRAST Date of Exam: 6/9/2025 11:00 AM EDT Indication: Hallucinations, tachycardia, ill. Comparison: None available. Technique: CTA of the chest was performed after the uneventful intravenous administration of 85 mL Isovue-370. Reconstructed coronal and sagittal images were also obtained. In addition, a 3-D volume rendered image was created for  interpretation. Automated exposure control and iterative reconstruction methods were used. FINDINGS: Thoracic inlet: Unremarkable. Pulmonary arteries: Suboptimal contrast bolus timing limit evaluation of the small peripheral pulmonary arteries. No large central pulmonary embolism is seen. Great vessels: The thoracic aorta and proximal arch vessels appear unremarkable. Mediastinum/Ivy: No pathologically enlarged mediastinal lymph nodes are seen. The esophagus is unremarkable. Lung parenchyma: The lungs appear adequately aerated. No acute consolidation is seen. No suspicious pulmonary nodules are seen. Trachea and airways: The trachea and central airways appear unremarkable. Pleural space: No significant pleural effusion or pneumothorax is seen. Heart and pericardium: The heart and pericardium appear unremarkable. Liver: The liver is prominent in size and decreased in attenuation. No focal liver lesion is seen. No biliary dilation is seen. Gallbladder: Surgically absent. Pancreas: Unremarkable. Spleen: Unremarkable. Adrenal glands: Unremarkable. Genitourinary tract: Peripelvic cysts noted within the left kidney. Kidneys are otherwise unremarkable. No hydronephrosis is seen. The visualized portions of the ureters and urinary bladder appear unremarkable. 5 cm mass associated with the right uterus,  likely a fibroid. Gastrointestinal tract: A few scattered colonic diverticula are seen. The hollow viscera appear otherwise unremarkable. There is no evidence of bowel obstruction. Appendix: The appendix is not identified. 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 No free air or free fluid is identified. No pathologically enlarged lymph nodes are seen. The abdominal aorta and IVC appear unremarkable. Bones and soft tissues: No acute or suspicious osseous or soft tissue lesion is identified.     Impression: 1.Suboptimal contrast bolus timing  limits evaluation of the small peripheral pulmonary arteries. No large central pulmonary embolism is seen. 2.No acute abnormality is identified within the chest, abdomen, or pelvis. 3.Enlarged, fatty liver. 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 5.Probable 5 cm fibroid within the right uterus. 6.Additional findings as detailed above. Electronically Signed: Dilshad North MD  6/9/2025 11:36 AM EDT  Workstation ID: [id number]          Current medications:  Scheduled Meds:amLODIPine, 5 mg, Oral, Q24H  levothyroxine, 75 mcg, Oral, Q AM  potassium chloride ER, 40 mEq, Oral, Q4H  sodium chloride, 10 mL, Intravenous, Q12H  valsartan, 320 mg, Oral, Daily      Continuous Infusions:niCARdipine, 5-15 mg/hr, Last Rate: 7.5 mg/hr (06/11/25 0828)  sodium chloride, 100 mL/hr, Last Rate: 100 mL/hr (06/11/25 0925)      PRN Meds:.  acetaminophen **OR** acetaminophen **OR** acetaminophen    senna-docusate sodium **AND** polyethylene glycol **AND** bisacodyl **AND** bisacodyl    Calcium Replacement - Follow Nurse / BPA Driven Protocol    HYDROcodone-acetaminophen    Magnesium Standard Dose Replacement - Follow Nurse / BPA Driven Protocol    melatonin    ondansetron    Phosphorus Replacement - Follow Nurse / BPA Driven Protocol    Potassium Replacement - Follow Nurse / BPA Driven Protocol    prochlorperazine    promethazine    Sodium Chloride (PF)    sodium chloride    sodium chloride    temazepam    Assessment & Plan   Assessment & Plan     Active Hospital Problems    Diagnosis  POA    **Intractable vomiting with nausea [R11.2]  Yes    HTN (hypertension) [I10]  Yes    Rheumatoid arthritis [M06.9]  Yes    Hypertensive crisis [I16.9]  Yes      Resolved Hospital Problems   No resolved problems to display.        Brief Hospital Course to date:  Barbara Phillips is a 49 y.o. female w/ HTN, hypothyroid, rheumatoid/psoriatic arthritis, morbid obesity, holding her MTX for planned  TKA and developed visual hallucinations ~4 days pta, then the night before arrival developed intractable N/V followed by a headache and was found to have BP of 237/139.     HTN Emergency  Abnl MRI likely c/w subacute CVA  Headache  Visual hallucinations  - Have d/w neurology - feels mri finding more c/w cva given risk factors. Stroke order set to include CTAs, echo.  - Back on cardene gtt - wean for bp < 170. Home meds ordered.  - Asa/statin     Mesenteric Panniculitis  -- noted on CT A/P, however, no other s/s of active infection. Continue to monitor off ABX for now      Hypothyroidism   - cont home levothyroxine     RA (reports rheumatoid/psoriatic arthritis)   - typically takes MTX on Fridays, last dose 5/30/25 in plan for surgery     Morbid Obesity  -- complicates all aspects of care    Expected Discharge Location and Transportation:   Expected Discharge   Expected Discharge Date: 6/11/2025; Expected Discharge Time:      VTE Prophylaxis:  Mechanical VTE prophylaxis orders are present.         AM-PAC 6 Clicks Score (PT): 24 (06/10/25 2000)    CODE STATUS:   Code Status and Medical Interventions: CPR (Attempt to Resuscitate); Full Support   Ordered at: 06/09/25 1400     Code Status (Patient has no pulse and is not breathing):    CPR (Attempt to Resuscitate)     Medical Interventions (Patient has pulse or is breathing):    Full Support       Olga Lidia Baeza II, DO  06/11/25

## 2025-06-11 NOTE — PAYOR COMM NOTE
"Haroldo Phillips (49 y.o. Female)       Date of Birth   1975    Social Security Number       Address   [address] [facility] [address]    Home Phone   [phone]    MRN   [id number]       [facility]    Marital Status   Single                            Admission Date   2025    Admission Type   Emergency    Admitting Provider   Olga Lidia Baeza II, DO    Attending Provider   Olga Lidia Baeza II, DO    Department, Room/Bed   [address], S520/1       Discharge Date       Discharge Disposition       Discharge Destination                                 Attending Provider: Olga Lidia Baeza II, DO    Allergies: Morphine And Codeine    Isolation: None   Infection: None   Code Status: CPR    Ht: 167.6 cm (66\")   Wt: 128 kg (282 lb)    Admission Cmt: None   Principal Problem: Intractable vomiting with nausea [R11.2]                   Active Insurance as of 2025       Primary Coverage       Payor Plan Insurance Group Employer/Plan Group    [employer] EMPLOYEE [id number]       Payor Plan Address Payor Plan Phone Number Payor Plan Fax Number Effective Dates    [address] [phone]  2015 - None Entered    [place] [address]         Subscriber Name Subscriber Birth Date Member ID       HAROLDO PHILLIPS 1975 [id number]                     Emergency Contacts        (Rel.) Home Phone Work Phone Mobile Phone    WoodThelma (Sister) [phone] -- --    THELMA PHILLIPS (Daughter) -- -- [phone]             [address]  [address] [facility] [address]  Phone:  [phone]  Fax:  [phone]        Patient:     Haroldo Phillips MRN:  [id number]   [address] [facility] [address] :  1975  SSN:    Phone: [phone] Sex:  F      INSURANCE PAYOR PLAN GROUP # SUBSCRIBER ID   Primary:    COLLEEN SCHAFFER [id number]   Admitting Diagnosis: HTN " (hypertension) [I10]  Order Date:  2025        Inpatient Admission       (Order ID: [id number])     Diagnosis:         Priority:  Routine Expected Date:   Expiration Date:        Interval:   Count:    Level of Care: Telemetry [5]  Diagnosis: Hypertensive emergency [269474]  Certification: I Certify That Inpatient Hospital Services Are Medically Necessary For Greater Than 2 Midnights     Specimen Type: ,   Specimen Source:   Specimen Taken Date:   Specimen Taken Time:                  Verbal Order Mode: Verbal with readback   Authorizing Provider: Olga Lidia Baeza II, DO  Authorizing Provider's NPI: [id number]     Order Entered By: Rene Watson RN 2025  1:13 PM     Electronically signed by:            History & Physical        Tutu Gay DO at 25 1401              [facility] Medicine Services  HISTORY AND PHYSICAL    Patient Name: Barbara Phillips  : 1975  MRN: [id number]  Primary Care Physician: Matilda Almendarez MD  Date of admission: 2025      Subjective  Subjective     Chief Complaint:  Nausea, vomiting    HPI:  Barbara Phillips is a 49 y.o. female w/ HTN, hypothyroidism, rheumatoid/psoriatic arthritis on MTX, morbid obesity, who is planned for TKA in the near future and last took her MTX 25 but held 25 dose in preparation for surgery. Approx 4 days ago she has developed visual hallucinations, seeing people that aren't there, or sometimes spiders on her hands. She states the the [beige] curtain on the wall has a bunch of different colors for her. Last night she acutely developed intractable N/V and has been up all night with the same. Today she has a severe headache. On arrival her BP was 237/139 mmHg, the ED started cardene gtt and requested admission for BP control.      Personal History     Past Medical History:   Diagnosis Date    Hypertension     Hypothyroidism     Rheumatoid arthritis            Past Surgical History:   Procedure  Laterality Date    APPENDECTOMY       SECTION      CHOLECYSTECTOMY      TONSILLECTOMY         Family History: family history is not on file.     Social History:  reports that she does not drink alcohol.  Social History     Social History Narrative    Not on file       Medications:  Available home medication information reviewed.  celecoxib, levothyroxine, methotrexate, valsartan, and vitamin D    Allergies   Allergen Reactions    Morphine And Codeine        Objective  Objective     Vital Signs:   Temp:  [98.4 °F (36.9 °C)] 98.4 °F (36.9 °C)  Heart Rate:  [] 116  Resp:  [22] 22  BP: (183-237)/() 198/97       Physical Exam   Constitutional: Awake, alert, uncomfortable appearing female sitting up on ED stretcher  Respiratory: Clear to auscultation bilaterally, respiratory effort normal   Cardiovascular: Regular tachycardia, palpable radial pulse  Gastrointestinal: Positive bowel sounds, soft, nontender, nondistended  Psychiatric: Appropriate affect, cooperative  Neurologic: Speech clear and fluent, moving all extremities spontaneously, no facial droop    Result Review:  I have personally reviewed the results from the time of this admission to 2025 14:01 EDT and agree with these findings:  [x]  Laboratory list / accordion  []  Microbiology  [x]  Radiology  []  EKG/Telemetry   []  Cardiology/Vascular   []  Pathology  []  Old records  []  Other:  Most notable findings include: Reviewed labs and imaging      LAB RESULTS:      Lab 25  1044 25  0747   WBC  --  10.40   HEMOGLOBIN  --  14.2   HEMATOCRIT  --  41.1   PLATELETS  --  497*   NEUTROS ABS  --  7.21*   IMMATURE GRANS (ABS)  --  0.03   LYMPHS ABS  --  2.13   MONOS ABS  --  0.71   EOS ABS  --  0.27   MCV  --  85.6   PROCALCITONIN  --  0.08   LACTATE 1.8 3.2*         Lab 25  0747   SODIUM 141   POTASSIUM 4.5   CHLORIDE 103   CO2 18.0*   ANION GAP 20.0*   BUN 13.6   CREATININE 0.97   EGFR 71.8   GLUCOSE 153*   CALCIUM 10.0    IONIZED CALCIUM 1.11*   MAGNESIUM 1.4*   TSH 9.890*         Lab 06/09/25  0747   TOTAL PROTEIN 8.5   ALBUMIN 4.7   GLOBULIN 3.8   ALT (SGPT) 11   AST (SGOT) 23   BILIRUBIN 0.4   ALK PHOS 112   LIPASE 13         Lab 06/09/25  0917 06/09/25  0747   PROBNP  --  1,104.0*   HSTROP T 25* 29*                 UA          6/9/2025    07:38   Urinalysis   Squamous Epithelial Cells, UA 0-2    Specific Gravity, UA 1.013    Ketones, UA Negative    Blood, UA Negative    Leukocytes, UA Negative    Nitrite, UA Negative    RBC, UA 0-2    WBC, UA 0-2    Bacteria, UA None Seen        Microbiology Results (last 10 days)       Procedure Component Value - Date/Time    Blood Culture - Blood, Hand, Left [[id number]] Collected: 06/09/25 0900    Lab Status: Preliminary result Specimen: Blood from Hand, Left Updated: 06/09/25 1000    Narrative:      Aerobic Bottle Only    Less than seven (7) mL's of blood was collected.  Insufficient quantity may yield false negative results.            CT Angiogram Chest  Result Date: 6/9/2025  CT ANGIOGRAM CHEST, CT ABDOMEN PELVIS W CONTRAST Date of Exam: 6/9/2025 11:00 AM EDT Indication: Hallucinations, tachycardia, ill. Comparison: None available. Technique: CTA of the chest was performed after the uneventful intravenous administration of 85 mL Isovue-370. Reconstructed coronal and sagittal images were also obtained. In addition, a 3-D volume rendered image was created for interpretation. Automated exposure control and iterative reconstruction methods were used. FINDINGS: Thoracic inlet: Unremarkable. Pulmonary arteries: Suboptimal contrast bolus timing limit evaluation of the small peripheral pulmonary arteries. No large central pulmonary embolism is seen. Great vessels: The thoracic aorta and proximal arch vessels appear unremarkable. Mediastinum/Ivy: No pathologically enlarged mediastinal lymph nodes are seen. The esophagus is unremarkable. Lung parenchyma: The lungs appear adequately aerated. No  acute consolidation is seen. No suspicious pulmonary nodules are seen. Trachea and airways: The trachea and central airways appear unremarkable. Pleural space: No significant pleural effusion or pneumothorax is seen. Heart and pericardium: The heart and pericardium appear unremarkable. Liver: The liver is prominent in size and decreased in attenuation. No focal liver lesion is seen. No biliary dilation is seen. Gallbladder: Surgically absent. Pancreas: Unremarkable. Spleen: Unremarkable. Adrenal glands: Unremarkable. Genitourinary tract: Peripelvic cysts noted within the left kidney. Kidneys are otherwise unremarkable. No hydronephrosis is seen. The visualized portions of the ureters and urinary bladder appear unremarkable. 5 cm mass associated with the right uterus,  likely a fibroid. Gastrointestinal tract: A few scattered colonic diverticula are seen. The hollow viscera appear otherwise unremarkable. There is no evidence of bowel obstruction. Appendix: The appendix is not identified. 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 No free air or free fluid is identified. No pathologically enlarged lymph nodes are seen. The abdominal aorta and IVC appear unremarkable. Bones and soft tissues: No acute or suspicious osseous or soft tissue lesion is identified.     Impression: 1.Suboptimal contrast bolus timing limits evaluation of the small peripheral pulmonary arteries. No large central pulmonary embolism is seen. 2.No acute abnormality is identified within the chest, abdomen, or pelvis. 3.Enlarged, fatty liver. 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 5.Probable 5 cm fibroid within the right uterus. 6.Additional findings as detailed above. Electronically Signed: Dilshad North MD  6/9/2025 11:36 AM EDT  Workstation ID: [id number]    CT Abdomen Pelvis With Contrast  Result  Date: 6/9/2025  CT ANGIOGRAM CHEST, CT ABDOMEN PELVIS W CONTRAST Date of Exam: 6/9/2025 11:00 AM EDT Indication: Hallucinations, tachycardia, ill. Comparison: None available. Technique: CTA of the chest was performed after the uneventful intravenous administration of 85 mL Isovue-370. Reconstructed coronal and sagittal images were also obtained. In addition, a 3-D volume rendered image was created for interpretation. Automated exposure control and iterative reconstruction methods were used. FINDINGS: Thoracic inlet: Unremarkable. Pulmonary arteries: Suboptimal contrast bolus timing limit evaluation of the small peripheral pulmonary arteries. No large central pulmonary embolism is seen. Great vessels: The thoracic aorta and proximal arch vessels appear unremarkable. Mediastinum/Ivy: No pathologically enlarged mediastinal lymph nodes are seen. The esophagus is unremarkable. Lung parenchyma: The lungs appear adequately aerated. No acute consolidation is seen. No suspicious pulmonary nodules are seen. Trachea and airways: The trachea and central airways appear unremarkable. Pleural space: No significant pleural effusion or pneumothorax is seen. Heart and pericardium: The heart and pericardium appear unremarkable. Liver: The liver is prominent in size and decreased in attenuation. No focal liver lesion is seen. No biliary dilation is seen. Gallbladder: Surgically absent. Pancreas: Unremarkable. Spleen: Unremarkable. Adrenal glands: Unremarkable. Genitourinary tract: Peripelvic cysts noted within the left kidney. Kidneys are otherwise unremarkable. No hydronephrosis is seen. The visualized portions of the ureters and urinary bladder appear unremarkable. 5 cm mass associated with the right uterus,  likely a fibroid. Gastrointestinal tract: A few scattered colonic diverticula are seen. The hollow viscera appear otherwise unremarkable. There is no evidence of bowel obstruction. Appendix: The appendix is not identified.  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 No free air or free fluid is identified. No pathologically enlarged lymph nodes are seen. The abdominal aorta and IVC appear unremarkable. Bones and soft tissues: No acute or suspicious osseous or soft tissue lesion is identified.     Impression: 1.Suboptimal contrast bolus timing limits evaluation of the small peripheral pulmonary arteries. No large central pulmonary embolism is seen. 2.No acute abnormality is identified within the chest, abdomen, or pelvis. 3.Enlarged, fatty liver. 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 5.Probable 5 cm fibroid within the right uterus. 6.Additional findings as detailed above. Electronically Signed: Dilshad North MD  6/9/2025 11:36 AM EDT  Workstation ID: [id number]    XR Chest 1 View  Result Date: 6/9/2025  XR CHEST 1 VW Date of Exam: 6/9/2025 9:19 AM EDT Indication: elevated BNP Comparison: None available. Findings: Cardiomediastinal silhouette is unremarkable.  No airspace disease, pneumothorax, nor pleural effusion. No acute osseous abnormality identified.     Impression: Impression: No acute cardiopulmonary process. Electronically Signed: Dale Combs MD  6/9/2025 9:41 AM EDT  Workstation ID: [id number]    CT Head Without Contrast  Result Date: 6/9/2025  CT HEAD WO CONTRAST Date of Exam: 6/9/2025 8:40 AM EDT Indication: ams. Comparison: None available. Technique: Axial CT images were obtained of the head without contrast administration.  Automated exposure control and iterative construction methods were used. Findings: No acute intracranial hemorrhage or extra-axial collection is identified. The ventricles appear normal in caliber, with no evidence of mass effect or midline shift. The basal cisterns appear patent. The gray-white differentiation appears preserved. The calvarium appear  intact. There is moderate frothy paranasal sinus mucosal disease. The mastoid air cells are well-aerated.     Impression: Impression: 1.No acute intracranial process identified. 2.Moderate frothy paranasal sinus mucosal disease. Correlate for acute sinusitis. Electronically Signed: Sohail Reyes MD  6/9/2025 9:06 AM EDT  Workstation ID: [id number]          Assessment & Plan  Assessment & Plan       Intractable vomiting with nausea    HTN (hypertension)    Rheumatoid arthritis    Hypertensive crisis    Summary: This is a 49-year-old female w/ HTN, hypothyroid, rheumatoid/psoriatic arthritis, morbid obesity, holding her MTX for planned TKA and developed visual hallucinations ~4 days pta, then the night before arrival developed intractable N/V followed by a headache, on arrival BP was 237/139 mmHg, , her Mag was 1.4, CT head was w/o acute process, CT a/p was w/o acute process and noted 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    Assessment/Plan    Intractable N/V  Headache  Visual hallucinations  Hypertensive crisis  - preliminary initial labs/imaging w/o evidence for infectious process  - cont cardene gtt, cont home losartan  - check inflammatory markers (CRP/ESR)  - check MRI to r/o PRES  - prn antiemetics  - IVF support  - single dose IV hydromorphone for severe headache (tylenol ineffective, can't have NSAIDS prior to planned TKA, and N/V preclude PO pain med)    Hypothyroidism - cont home levothyroxine  RA (reports rheumatoid/psoriatic arthritis) - typically takes MTX on Fridays, last dose 5/30/25 in plan for surgery  Morbid Obesity      VTE Prophylaxis:  Mechanical VTE prophylaxis orders are signed & held.            CODE STATUS:    Code Status and Medical Interventions: CPR (Attempt to Resuscitate); Full Support   Ordered at: 06/09/25 1400     Code Status (Patient has no pulse and is not breathing):    CPR (Attempt to Resuscitate)     Medical Interventions (Patient has pulse or is breathing):    Full Support        Expected Discharge   Expected discharge date/ time has not been documented.     Tutu Gay DO  25      Electronically signed by Tutu Gay DO at 25 1458          Physician Progress Notes (all)        Vidal Weldon DO at 25 0917           [facility]    Progress Note    Patient Name: Barbara Phillips  : 1975  MRN: [id number]  Primary Care Physician:  Matilda Almendarez MD  Date of admission: 2025    Subjective     Reason for consult: MRI concerning for demyelinating process versus vasculitis     History of Present Illness   Was placed on a Cardene drip overnight.  Feels better today    Review of Systems   General: Negative for fever, nausea, or vomiting.   Neurological: Negative for headache, pain, or weakness.     Objective     Vitals:   Temp:  [98.1 °F (36.7 °C)-98.7 °F (37.1 °C)] 98.1 °F (36.7 °C)  Heart Rate:  [] 90  Resp:  [18] 18  BP: (116-191)/() 133/75  Flow (L/min) (Oxygen Therapy):  [2] 2    Neurologic Exam   Mental status/Cognition: Awake and alert  Speech/language: fluent    Cranial nerves: Tracks examiner. Face appears symmetric. No dysarthria.      Motor: Moves all extremities        Current Medications    Current Facility-Administered Medications:     acetaminophen (TYLENOL) tablet 650 mg, 650 mg, Oral, Q4H PRN, 650 mg at 25 0624 **OR** acetaminophen (TYLENOL) 160 MG/5ML oral solution 650 mg, 650 mg, Oral, Q4H PRN **OR** acetaminophen (TYLENOL) suppository 650 mg, 650 mg, Rectal, Q4H PRN, Tutu Gay DO    amLODIPine (NORVASC) tablet 5 mg, 5 mg, Oral, Q24H, Olga Lidia Baeza II, DO    sennosides-docusate (PERICOLACE) 8.6-50 MG per tablet 2 tablet, 2 tablet, Oral, BID PRN **AND** polyethylene glycol (MIRALAX) packet 17 g, 17 g, Oral, Daily PRN **AND** bisacodyl (DULCOLAX) EC tablet 5 mg, 5 mg, Oral, Daily PRN **AND** bisacodyl (DULCOLAX) suppository 10 mg, 10 mg, Rectal, Daily PRN, Tutu Gay,  DO    Calcium Replacement - Follow Nurse / BPA Driven Protocol, , Not Applicable, PRN, Tutu Gay DO    HYDROcodone-acetaminophen (NORCO) 5-325 MG per tablet 1 tablet, 1 tablet, Oral, Q12H PRN, Rocío Mehta MD, 1 tablet at 06/11/25 0134    levothyroxine (SYNTHROID, LEVOTHROID) tablet 75 mcg, 75 mcg, Oral, Q AM, Tutu Gay DO, 75 mcg at 06/11/25 0624    Magnesium Standard Dose Replacement - Follow Nurse / BPA Driven Protocol, , Not Applicable, PRN, Tutu Gay,     melatonin tablet 5 mg, 5 mg, Oral, Nightly PRN, Tutu Gay DO, 5 mg at 06/10/25 2101    niCARdipine (CARDENE) 25mg in 250mL NS infusion, 5-15 mg/hr, Intravenous, Titrated, [place], Nancy V, APRN, Last Rate: 75 mL/hr at 06/11/25 0828, 7.5 mg/hr at 06/11/25 0828    ondansetron (ZOFRAN) injection 4 mg, 4 mg, Intravenous, Q6H PRN, Tutu Gay DO, 4 mg at 06/10/25 0212    Phosphorus Replacement - Follow Nurse / BPA Driven Protocol, , Not Applicable, PRN, Tutu Gay DO    potassium chloride (KLOR-CON M20) CR tablet 40 mEq, 40 mEq, Oral, Q4H, Olga Lidia Baeza II, DO    Potassium Replacement - Follow Nurse / BPA Driven Protocol, , Not Applicable, PRN, Tutu Gay DO    prochlorperazine (COMPAZINE) injection 5 mg, 5 mg, Intravenous, Q6H PRN, Tutu Gay DO, 5 mg at 06/09/25 1505    promethazine (PHENERGAN) 12.5 mg in sodium chloride 0.9 % 50 mL, 12.5 mg, Intravenous, Q6H PRN, Tutu Gay DO, 12.5 mg at 06/09/25 1523    Sodium Chloride (PF) 0.9 % 10 mL, 10 mL, Intravenous, PRN, Dale Nick MD    sodium chloride 0.9 % flush 10 mL, 10 mL, Intravenous, Q12H, Tutu Gay, , 10 mL at 06/10/25 2052    sodium chloride 0.9 % flush 10 mL, 10 mL, Intravenous, PRN, Tutu Gay DO    sodium chloride 0.9 % infusion 40 mL, 40 mL, Intravenous, PRN, Tutu Gay DO    sodium chloride 0.9 % infusion, 100 mL/hr, Intravenous, Continuous, Shields,  "Tutu Sandhu DO, Last Rate: 100 mL/hr at 06/10/25 2144, 100 mL/hr at 06/10/25 2144    temazepam (RESTORIL) capsule 15 mg, 15 mg, Oral, Nightly PRN, Tutu Gay DO    valsartan (DIOVAN) tablet 320 mg, 320 mg, Oral, Daily, Olga Lidia Baeza II, DO    Laboratory Results:   Lab Results   Component Value Date    GLUCOSE 124 (H) 06/11/2025    CALCIUM 8.0 (L) 06/11/2025     06/11/2025    K 3.6 06/11/2025    CO2 22.0 06/11/2025     06/11/2025    BUN 5.7 (L) 06/11/2025    CREATININE 0.65 06/11/2025    BCR 8.8 06/11/2025    ANIONGAP 11.0 06/11/2025     Lab Results   Component Value Date    WBC 8.75 06/11/2025    HGB 11.9 (L) 06/11/2025    HCT 36.2 06/11/2025    MCV 91.0 06/11/2025     06/11/2025     No results found for: \"CHOL\"  No results found for: \"HDL\"  No results found for: \"LDL\"  No results found for: \"TRIG\"  Lab Results   Component Value Date    HGBA1C 6.00 (H) 10/17/2024     No results found for: \"FOLATE\"  No results found for: \"MKGSIDEQ24\"    Blood culture NGTD     TSH 9.89  Free T4 0.94     CRP 1.5  Lactate 1.8 down from 3.2  Pro-Kvng 0.08  Sed rate 53     UA noninfectious    Images/Procedures   CT head 6/9/2025  No acute findings     MRI brain without contrast 6/9/2025  White matter changes likely demyelination disease with differential considerations including sequelae of prior inflammation or infection, vasculitis, chronic small vessel ischemic change    MRI brain with and without contrast 6/11/2025  Small focus of relative diffusion restriction in the left frontotemporal white matter with subtle enhancement.  Demyelination versus subacute infarct.  Numerous scattered FLAIR hyperintensities possibly from demyelinating disease or chronic small vessel disease    MRI cervical spine with and without contrast 6/11/2025  No evidence of demyelinating disease    Assessment / Plan   Active Hospital Problems:  Concern for demyelination versus vasculitis versus chronic small vessel disease "   Suspect subacute left frontotemporal infarct    Brief Patient Summary:  Barbara Phillips is a 49 y.o. female with history of  rheumatoid arthritis, hypertension, hypothyroidism who presented to [facility] ED on  with vomiting, shaking and leg pain.  Neurology was consulted after MRI brain was obtained to rule out PRES that showed possible demyelination.  Patient denies prior or current neurologic symptoms.  Her exam is unremarkable.    Initial MRI showed findings concerning for demyelination.  Repeated MRI with contrast which showed an area of diffusion restriction concerning for subacute infarct.  Recommend further stroke workup with CTA's, echo, A1c and lipids.  Right cervical spine showed no areas of demyelination.  Will go ahead and start her on aspirin and atorvastatin.  She is now agreeable to lumbar puncture, we will go ahead and obtain this while she is inpatient.     Plan:   - CTA brain and neck  - Echocardiogram  - Start aspirin 81 mg and atorvastatin 80 mg  - Check hemoglobin A1c, lipids  - Lumbar puncture with IR  - Okay to give a one-time dose of IV Valium 5 mg prior to lumbar puncture    I have discussed the above with the patient, family, bedside RN, hospitalist  Time spent with patient: 45 minutes in face-to-face evaluation and management of the patient.      Vidal Weldon DO, MS              Electronically signed by Vidal Weldon DO at 25 1225       Olga Lidia Baeza II, DO at 25 0851              [facility] Medicine Services  PROGRESS NOTE    Patient Name: Barbara Phillips  : 1975  MRN: [id number]    Date of Admission: 2025  Primary Care Physician: Matilda Almendarez MD    Subjective   Subjective     CC: f/u AMS    HPI: Up in bed. Feels much better. No hallucinations, n/v. Placed back on IV cardene overnight.      Objective   Objective     Vital Signs:   Temp:  [98.1 °F (36.7 °C)-98.7 °F (37.1 °C)] 98.1 °F (36.7 °C)  Heart Rate:  [] 97  Resp:  [18]  18  BP: (116-191)/() 141/68  Flow (L/min) (Oxygen Therapy):  [2] 2     Physical Exam:  Constitutional: No acute distress, awake, alert  HENT: NCAT, mucous membranes moist  Respiratory: Clear to auscultation bilaterally, respiratory effort normal   Cardiovascular: RRR, no murmurs, rubs, or gallops  Gastrointestinal: Positive bowel sounds, soft, nontender, nondistended  Musculoskeletal: No bilateral ankle edema  Psychiatric: Appropriate affect, cooperative  Neurologic: Oriented x 3, strength symmetric in all extremities, Cranial Nerves grossly intact to confrontation, speech clear  Skin: No rashes     Results Reviewed:  LAB RESULTS:      Lab 06/11/25  0445 06/10/25  0447 06/09/25  1044 06/09/25  0917 06/09/25  0747   WBC 8.75 9.47  --   --  10.40   HEMOGLOBIN 11.9* 12.3  --   --  14.2   HEMATOCRIT 36.2 37.8  --   --  41.1   PLATELETS 429 403  --   --  497*   NEUTROS ABS 4.09 5.00  --   --  7.21*   IMMATURE GRANS (ABS) 0.03 0.02  --   --  0.03   LYMPHS ABS 2.99 2.88  --   --  2.13   MONOS ABS 0.81 0.93*  --   --  0.71   EOS ABS 0.76* 0.57*  --   --  0.27   MCV 91.0 91.5  --   --  85.6   SED RATE  --   --   --   --  53*   CRP  --   --   --  1.50*  --    PROCALCITONIN  --   --   --   --  0.08   LACTATE  --   --  1.8  --  3.2*   HSTROP T  --   --   --  25* 29*         Lab 06/11/25  0445 06/10/25  1157 06/09/25  0747   SODIUM 140 142 141   POTASSIUM 3.6 3.8 4.5   CHLORIDE 107 107 103   CO2 22.0 23.0 18.0*   ANION GAP 11.0 12.0 20.0*   BUN 5.7* 6.2 13.6   CREATININE 0.65 0.69 0.97   EGFR 108.1 106.5 71.8   GLUCOSE 124* 116* 153*   CALCIUM 8.0* 8.3* 10.0   IONIZED CALCIUM  --   --  1.11*   MAGNESIUM  --  1.7 1.4*   TSH  --   --  9.890*         Lab 06/09/25  0747   TOTAL PROTEIN 8.5   ALBUMIN 4.7   GLOBULIN 3.8   ALT (SGPT) 11   AST (SGOT) 23   BILIRUBIN 0.4   ALK PHOS 112   LIPASE 13         Lab 06/09/25  0917 06/09/25  0747   PROBNP  --  1,104.0*   HSTROP T 25* 29*                 Brief Urine Lab Results  (Last result  in the past 365 days)        Color   Clarity   Blood   Leuk Est   Nitrite   Protein   CREAT   Urine HCG        06/09/25 0738               Negative       06/09/25 0738 Yellow   Clear   Negative   Negative   Negative   30 mg/dL (1+)                   Microbiology Results Abnormal       None            MRI Cervical Spine With & Without Contrast  Result Date: 6/11/2025  MRI CERVICAL SPINE W WO CONTRAST Date of Exam: 6/11/2025 12:03 AM EDT Indication: MS eval.  Comparison: None available. Technique:  Routine multiplanar/multisequence sequence images of the cervical spine were obtained before and after the uneventful administration of 20 mL Multihance.  Findings: Seven cervical vertebrae are identified. Alignment is anatomic. Disc spaces maintain normal height. Vertebral bodies maintain normal height.  There is no focal bone marrow edema. There is no evidence of a marrow replacing process. No acute osseous abnormalities. Spinal cord signal is normal. Paraspinal musculature is grossly preserved. There is no evidence of cervical lymphadenopathy or a neck mass. The craniocervical junction appears within normal limits. Limited view of the posterior fossa contents is unremarkable. No abnormal enhancement. C2-C3: The posterior disc is unremarkable. The uncovertebral joints appear within normal limits. Mild left facet hypertrophy. No significant neural foraminal or spinal canal stenosis. C3-C4: The posterior disc is unremarkable. The uncovertebral joints appear within normal limits. Mild left facet hypertrophy. No significant neural foraminal or spinal canal stenosis. C4-C5: The posterior disc is unremarkable. The uncovertebral joints appear within normal limits. No significant facet arthropathy. No significant neural foraminal or spinal canal stenosis. C5-C6: The posterior disc is unremarkable. The uncovertebral joints appear within normal limits. No significant facet arthropathy. No significant neural foraminal or spinal  canal stenosis. C6-C7: The posterior disc is unremarkable. The uncovertebral joints appear within normal limits. No significant facet arthropathy. No significant neural foraminal or spinal canal stenosis. C7-T1: The posterior disc is unremarkable. No significant facet arthropathy. No significant neural foraminal or spinal canal stenosis.     Impression: Impression: 1.No evidence of demyelinating disease in the cervical spine. 2.Mild left-sided facet arthritis. Electronically Signed: Tutu Phillips MD  6/11/2025 3:38 AM EDT  Workstation ID: [id number]    MRI Brain With & Without Contrast  Result Date: 6/11/2025  MRI BRAIN W WO CONTRAST Date of Exam: 6/11/2025 12:03 AM EDT Indication: follow up contrasted portion of possible demyelination seen on noncontrasted MRI. MS protocol with saggital FLAIR.  Comparison: None available. Technique:  Routine multiplanar/multisequence sequence images of the brain were obtained before and after the uneventful administration of 20 mL Multihance. Findings: There is a small ovoid focus of relative diffusion restriction again noted in the left frontoparietal white matter. It measures approximately 8 mm in diameter. There is subtle enhancement. This may relate to a focus of demyelination or subacute lacunar infarct. There are numerous scattered FLAIR hyperintensities within the cerebral white matter. This is predominantly periventricular, but there are numerous subcortical foci, as well. There is no evidence of an acute infarct. The pattern of white matter disease could be from demyelinating disease although chronic small vessel ischemic change is certainly possible. Cerebral cortex appears intact. There is a small focus of FLAIR hyperintense signal within the left putamen. Cerebellum and brainstem appear unremarkable. Major arterial flow voids appear intact. There is severe left and mild right maxillary sinus mucosal disease. Mastoid air cells appear well aerated. Orbits are symmetric.  Optic nerves are normal. The midline structures appear intact. Calvarial and superficial soft tissue signal is within normal limits.     Impression: Impression: 1.Small focus of relative diffusion restriction in the left frontoparietal white matter with subtle enhancement. This could relate to a focus of demyelination or subacute lacunar infarct. 2.Numerous scattered FLAIR hyperintensities in the cerebral white matter. This could be from demyelinating disease or chronic small vessel ischemic change. 3.Severe left and mild right maxillary sinus mucosal disease. Electronically Signed: Tutu Phillips MD  6/11/2025 3:21 AM EDT  Workstation ID: [id number]    MRI Brain Without Contrast  Result Date: 6/9/2025  MRI BRAIN WO CONTRAST Date of Exam: 6/9/2025 8:50 PM EDT Indication: Visual hallucinations, SBP >200 mmHg, vomiting, r/o PRES.  Comparison: CT of the head performed on the same date. Technique:  Routine multiplanar/multisequence sequence images of the brain were obtained without contrast administration. Findings: No area of restricted diffusion is identified to suggest an acute intracranial infarct. Multifocal areas of nonspecific increased T2/FLAIR signal predominantly within the periventricular white matter. The ventricles and sulci are normal in size  and configuration. No intracranial mass or mass effect. No extra-axial mass or collection. The posterior fossa is normal. Sellar and suprasellar structures are normal. The major intracranial flow-voids of the [place] are patent. Orbital and periorbital soft tissues are normal. Severe mucosal thickening of the left maxillary sinus. Mucoid retention cysts or polyps within the right maxillary sinus.     Impression: Impression: White matter changes likely demyelinating disease with differential considerations including sequela prior inflammation or infection, vasculitis, or chronic small vessel/microangiopathic ischemic changes Electronically Signed: Kyle Hurt  MD  6/9/2025 9:20 PM EDT  Workstation ID: [id number]    CT Angiogram Chest  Result Date: 6/9/2025  CT ANGIOGRAM CHEST, CT ABDOMEN PELVIS W CONTRAST Date of Exam: 6/9/2025 11:00 AM EDT Indication: Hallucinations, tachycardia, ill. Comparison: None available. Technique: CTA of the chest was performed after the uneventful intravenous administration of 85 mL Isovue-370. Reconstructed coronal and sagittal images were also obtained. In addition, a 3-D volume rendered image was created for interpretation. Automated exposure control and iterative reconstruction methods were used. FINDINGS: Thoracic inlet: Unremarkable. Pulmonary arteries: Suboptimal contrast bolus timing limit evaluation of the small peripheral pulmonary arteries. No large central pulmonary embolism is seen. Great vessels: The thoracic aorta and proximal arch vessels appear unremarkable. Mediastinum/Ivy: No pathologically enlarged mediastinal lymph nodes are seen. The esophagus is unremarkable. Lung parenchyma: The lungs appear adequately aerated. No acute consolidation is seen. No suspicious pulmonary nodules are seen. Trachea and airways: The trachea and central airways appear unremarkable. Pleural space: No significant pleural effusion or pneumothorax is seen. Heart and pericardium: The heart and pericardium appear unremarkable. Liver: The liver is prominent in size and decreased in attenuation. No focal liver lesion is seen. No biliary dilation is seen. Gallbladder: Surgically absent. Pancreas: Unremarkable. Spleen: Unremarkable. Adrenal glands: Unremarkable. Genitourinary tract: Peripelvic cysts noted within the left kidney. Kidneys are otherwise unremarkable. No hydronephrosis is seen. The visualized portions of the ureters and urinary bladder appear unremarkable. 5 cm mass associated with the right uterus,  likely a fibroid. Gastrointestinal tract: A few scattered colonic diverticula are seen. The hollow viscera appear otherwise unremarkable. There is  no evidence of bowel obstruction. Appendix: The appendix is not identified. 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 No free air or free fluid is identified. No pathologically enlarged lymph nodes are seen. The abdominal aorta and IVC appear unremarkable. Bones and soft tissues: No acute or suspicious osseous or soft tissue lesion is identified.     Impression: 1.Suboptimal contrast bolus timing limits evaluation of the small peripheral pulmonary arteries. No large central pulmonary embolism is seen. 2.No acute abnormality is identified within the chest, abdomen, or pelvis. 3.Enlarged, fatty liver. 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 5.Probable 5 cm fibroid within the right uterus. 6.Additional findings as detailed above. Electronically Signed: Dilshad North MD  6/9/2025 11:36 AM EDT  Workstation ID: [id number]    CT Abdomen Pelvis With Contrast  Result Date: 6/9/2025  CT ANGIOGRAM CHEST, CT ABDOMEN PELVIS W CONTRAST Date of Exam: 6/9/2025 11:00 AM EDT Indication: Hallucinations, tachycardia, ill. Comparison: None available. Technique: CTA of the chest was performed after the uneventful intravenous administration of 85 mL Isovue-370. Reconstructed coronal and sagittal images were also obtained. In addition, a 3-D volume rendered image was created for interpretation. Automated exposure control and iterative reconstruction methods were used. FINDINGS: Thoracic inlet: Unremarkable. Pulmonary arteries: Suboptimal contrast bolus timing limit evaluation of the small peripheral pulmonary arteries. No large central pulmonary embolism is seen. Great vessels: The thoracic aorta and proximal arch vessels appear unremarkable. Mediastinum/Ivy: No pathologically enlarged mediastinal lymph nodes are seen. The esophagus is unremarkable. Lung parenchyma: The lungs appear  adequately aerated. No acute consolidation is seen. No suspicious pulmonary nodules are seen. Trachea and airways: The trachea and central airways appear unremarkable. Pleural space: No significant pleural effusion or pneumothorax is seen. Heart and pericardium: The heart and pericardium appear unremarkable. Liver: The liver is prominent in size and decreased in attenuation. No focal liver lesion is seen. No biliary dilation is seen. Gallbladder: Surgically absent. Pancreas: Unremarkable. Spleen: Unremarkable. Adrenal glands: Unremarkable. Genitourinary tract: Peripelvic cysts noted within the left kidney. Kidneys are otherwise unremarkable. No hydronephrosis is seen. The visualized portions of the ureters and urinary bladder appear unremarkable. 5 cm mass associated with the right uterus,  likely a fibroid. Gastrointestinal tract: A few scattered colonic diverticula are seen. The hollow viscera appear otherwise unremarkable. There is no evidence of bowel obstruction. Appendix: The appendix is not identified. 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 No free air or free fluid is identified. No pathologically enlarged lymph nodes are seen. The abdominal aorta and IVC appear unremarkable. Bones and soft tissues: No acute or suspicious osseous or soft tissue lesion is identified.     Impression: 1.Suboptimal contrast bolus timing limits evaluation of the small peripheral pulmonary arteries. No large central pulmonary embolism is seen. 2.No acute abnormality is identified within the chest, abdomen, or pelvis. 3.Enlarged, fatty liver. 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 5.Probable 5 cm fibroid within the right uterus. 6.Additional findings as detailed above. Electronically Signed: Dilshad North MD  6/9/2025 11:36 AM EDT  Workstation ID: [id number]          Current  medications:  Scheduled Meds:amLODIPine, 5 mg, Oral, Q24H  levothyroxine, 75 mcg, Oral, Q AM  potassium chloride ER, 40 mEq, Oral, Q4H  sodium chloride, 10 mL, Intravenous, Q12H  valsartan, 320 mg, Oral, Daily      Continuous Infusions:niCARdipine, 5-15 mg/hr, Last Rate: 7.5 mg/hr (06/11/25 0828)  sodium chloride, 100 mL/hr, Last Rate: 100 mL/hr (06/11/25 0925)      PRN Meds:.  acetaminophen **OR** acetaminophen **OR** acetaminophen    senna-docusate sodium **AND** polyethylene glycol **AND** bisacodyl **AND** bisacodyl    Calcium Replacement - Follow Nurse / BPA Driven Protocol    HYDROcodone-acetaminophen    Magnesium Standard Dose Replacement - Follow Nurse / BPA Driven Protocol    melatonin    ondansetron    Phosphorus Replacement - Follow Nurse / BPA Driven Protocol    Potassium Replacement - Follow Nurse / BPA Driven Protocol    prochlorperazine    promethazine    Sodium Chloride (PF)    sodium chloride    sodium chloride    temazepam    Assessment & Plan   Assessment & Plan     Active Hospital Problems    Diagnosis  POA    **Intractable vomiting with nausea [R11.2]  Yes    HTN (hypertension) [I10]  Yes    Rheumatoid arthritis [M06.9]  Yes    Hypertensive crisis [I16.9]  Yes      Resolved Hospital Problems   No resolved problems to display.        Brief Hospital Course to date:  Barbara Phillips is a 49 y.o. female w/ HTN, hypothyroid, rheumatoid/psoriatic arthritis, morbid obesity, holding her MTX for planned TKA and developed visual hallucinations ~4 days pta, then the night before arrival developed intractable N/V followed by a headache and was found to have BP of 237/139.     HTN Emergency  Abnl MRI likely c/w subacute CVA  Headache  Visual hallucinations  - Have d/w neurology - feels mri finding more c/w cva given risk factors. Stroke order set to include CTAs, echo.  - Back on cardene gtt - wean for bp < 170. Home meds ordered.  - Asa/statin     Mesenteric Panniculitis  -- noted on CT A/P, however, no other  s/s of active infection. Continue to monitor off ABX for now      Hypothyroidism   - cont home levothyroxine     RA (reports rheumatoid/psoriatic arthritis)   - typically takes MTX on , last dose 25 in plan for surgery     Morbid Obesity  -- complicates all aspects of care    Expected Discharge Location and Transportation:   Expected Discharge   Expected Discharge Date: 2025; Expected Discharge Time:      VTE Prophylaxis:  Mechanical VTE prophylaxis orders are present.         AM-PAC 6 Clicks Score (PT): 24 (06/10/25 2000)    CODE STATUS:   Code Status and Medical Interventions: CPR (Attempt to Resuscitate); Full Support   Ordered at: 25 1400     Code Status (Patient has no pulse and is not breathing):    CPR (Attempt to Resuscitate)     Medical Interventions (Patient has pulse or is breathing):    Full Support       Olga Lidia Baeza II, DO  25       Electronically signed by Olga Lidia Baeza II, DO at 25 1018       Rocío Mehta MD at 06/10/25 0511              [facility] Medicine Services  PROGRESS NOTE    Patient Name: Barbara Phillips  : 1975  MRN: [id number]    Date of Admission: 2025  Primary Care Physician: Matilda Almendarez MD    Subjective   Subjective     CC:  Hallucinations     HPI:  Doing better this am, headache is better. BP is better      Objective   Objective     Vital Signs:   Temp:  [98.3 °F (36.8 °C)-98.5 °F (36.9 °C)] 98.5 °F (36.9 °C)  Heart Rate:  [] 109  Resp:  [16-22] 16  BP: (135-237)/() 160/75  Flow (L/min) (Oxygen Therapy):  [2] 2     Physical Exam:  Constitutional: No acute distress, awake, alert  HENT: NCAT, mucous membranes moist  Respiratory: Clear to auscultation bilaterally, respiratory effort normal   Cardiovascular: RRR, no murmurs, rubs, or gallops  Gastrointestinal: Positive bowel sounds, soft, nontender, nondistended  Musculoskeletal: No bilateral ankle edema  Psychiatric: Appropriate  affect, cooperative  Neurologic: Oriented x 3, strength symmetric in all extremities, Cranial Nerves grossly intact to confrontation, speech clear  Skin: No rashes     Results Reviewed:  LAB RESULTS:      Lab 06/09/25  1044 06/09/25  0917 06/09/25  0747   WBC  --   --  10.40   HEMOGLOBIN  --   --  14.2   HEMATOCRIT  --   --  41.1   PLATELETS  --   --  497*   NEUTROS ABS  --   --  7.21*   IMMATURE GRANS (ABS)  --   --  0.03   LYMPHS ABS  --   --  2.13   MONOS ABS  --   --  0.71   EOS ABS  --   --  0.27   MCV  --   --  85.6   SED RATE  --   --  53*   CRP  --  1.50*  --    PROCALCITONIN  --   --  0.08   LACTATE 1.8  --  3.2*   HSTROP T  --  25* 29*         Lab 06/09/25  0747   SODIUM 141   POTASSIUM 4.5   CHLORIDE 103   CO2 18.0*   ANION GAP 20.0*   BUN 13.6   CREATININE 0.97   EGFR 71.8   GLUCOSE 153*   CALCIUM 10.0   IONIZED CALCIUM 1.11*   MAGNESIUM 1.4*   TSH 9.890*         Lab 06/09/25  0747   TOTAL PROTEIN 8.5   ALBUMIN 4.7   GLOBULIN 3.8   ALT (SGPT) 11   AST (SGOT) 23   BILIRUBIN 0.4   ALK PHOS 112   LIPASE 13         Lab 06/09/25  0917 06/09/25  0747   PROBNP  --  1,104.0*   HSTROP T 25* 29*                 Brief Urine Lab Results  (Last result in the past 365 days)        Color   Clarity   Blood   Leuk Est   Nitrite   Protein   CREAT   Urine HCG        06/09/25 0738               Negative       06/09/25 0738 Yellow   Clear   Negative   Negative   Negative   30 mg/dL (1+)                   Microbiology Results Abnormal       None            MRI Brain Without Contrast  Result Date: 6/9/2025  MRI BRAIN WO CONTRAST Date of Exam: 6/9/2025 8:50 PM EDT Indication: Visual hallucinations, SBP >200 mmHg, vomiting, r/o PRES.  Comparison: CT of the head performed on the same date. Technique:  Routine multiplanar/multisequence sequence images of the brain were obtained without contrast administration. Findings: No area of restricted diffusion is identified to suggest an acute intracranial infarct. Multifocal areas of  nonspecific increased T2/FLAIR signal predominantly within the periventricular white matter. The ventricles and sulci are normal in size  and configuration. No intracranial mass or mass effect. No extra-axial mass or collection. The posterior fossa is normal. Sellar and suprasellar structures are normal. The major intracranial flow-voids of the [place] are patent. Orbital and periorbital soft tissues are normal. Severe mucosal thickening of the left maxillary sinus. Mucoid retention cysts or polyps within the right maxillary sinus.     Impression: Impression: White matter changes likely demyelinating disease with differential considerations including sequela prior inflammation or infection, vasculitis, or chronic small vessel/microangiopathic ischemic changes Electronically Signed: Kyle Hurt MD  6/9/2025 9:20 PM EDT  Workstation ID: [id number]    CT Angiogram Chest  Result Date: 6/9/2025  CT ANGIOGRAM CHEST, CT ABDOMEN PELVIS W CONTRAST Date of Exam: 6/9/2025 11:00 AM EDT Indication: Hallucinations, tachycardia, ill. Comparison: None available. Technique: CTA of the chest was performed after the uneventful intravenous administration of 85 mL Isovue-370. Reconstructed coronal and sagittal images were also obtained. In addition, a 3-D volume rendered image was created for interpretation. Automated exposure control and iterative reconstruction methods were used. FINDINGS: Thoracic inlet: Unremarkable. Pulmonary arteries: Suboptimal contrast bolus timing limit evaluation of the small peripheral pulmonary arteries. No large central pulmonary embolism is seen. Great vessels: The thoracic aorta and proximal arch vessels appear unremarkable. Mediastinum/Ivy: No pathologically enlarged mediastinal lymph nodes are seen. The esophagus is unremarkable. Lung parenchyma: The lungs appear adequately aerated. No acute consolidation is seen. No suspicious pulmonary nodules are seen. Trachea and airways: The trachea and  central airways appear unremarkable. Pleural space: No significant pleural effusion or pneumothorax is seen. Heart and pericardium: The heart and pericardium appear unremarkable. Liver: The liver is prominent in size and decreased in attenuation. No focal liver lesion is seen. No biliary dilation is seen. Gallbladder: Surgically absent. Pancreas: Unremarkable. Spleen: Unremarkable. Adrenal glands: Unremarkable. Genitourinary tract: Peripelvic cysts noted within the left kidney. Kidneys are otherwise unremarkable. No hydronephrosis is seen. The visualized portions of the ureters and urinary bladder appear unremarkable. 5 cm mass associated with the right uterus,  likely a fibroid. Gastrointestinal tract: A few scattered colonic diverticula are seen. The hollow viscera appear otherwise unremarkable. There is no evidence of bowel obstruction. Appendix: The appendix is not identified. 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 No free air or free fluid is identified. No pathologically enlarged lymph nodes are seen. The abdominal aorta and IVC appear unremarkable. Bones and soft tissues: No acute or suspicious osseous or soft tissue lesion is identified.     Impression: 1.Suboptimal contrast bolus timing limits evaluation of the small peripheral pulmonary arteries. No large central pulmonary embolism is seen. 2.No acute abnormality is identified within the chest, abdomen, or pelvis. 3.Enlarged, fatty liver. 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 5.Probable 5 cm fibroid within the right uterus. 6.Additional findings as detailed above. Electronically Signed: Dilshad North MD  6/9/2025 11:36 AM EDT  Workstation ID: [id number]    CT Abdomen Pelvis With Contrast  Result Date: 6/9/2025  CT ANGIOGRAM CHEST, CT ABDOMEN PELVIS W CONTRAST Date of Exam: 6/9/2025 11:00 AM EDT  Indication: Hallucinations, tachycardia, ill. Comparison: None available. Technique: CTA of the chest was performed after the uneventful intravenous administration of 85 mL Isovue-370. Reconstructed coronal and sagittal images were also obtained. In addition, a 3-D volume rendered image was created for interpretation. Automated exposure control and iterative reconstruction methods were used. FINDINGS: Thoracic inlet: Unremarkable. Pulmonary arteries: Suboptimal contrast bolus timing limit evaluation of the small peripheral pulmonary arteries. No large central pulmonary embolism is seen. Great vessels: The thoracic aorta and proximal arch vessels appear unremarkable. Mediastinum/Ivy: No pathologically enlarged mediastinal lymph nodes are seen. The esophagus is unremarkable. Lung parenchyma: The lungs appear adequately aerated. No acute consolidation is seen. No suspicious pulmonary nodules are seen. Trachea and airways: The trachea and central airways appear unremarkable. Pleural space: No significant pleural effusion or pneumothorax is seen. Heart and pericardium: The heart and pericardium appear unremarkable. Liver: The liver is prominent in size and decreased in attenuation. No focal liver lesion is seen. No biliary dilation is seen. Gallbladder: Surgically absent. Pancreas: Unremarkable. Spleen: Unremarkable. Adrenal glands: Unremarkable. Genitourinary tract: Peripelvic cysts noted within the left kidney. Kidneys are otherwise unremarkable. No hydronephrosis is seen. The visualized portions of the ureters and urinary bladder appear unremarkable. 5 cm mass associated with the right uterus,  likely a fibroid. Gastrointestinal tract: A few scattered colonic diverticula are seen. The hollow viscera appear otherwise unremarkable. There is no evidence of bowel obstruction. Appendix: The appendix is not identified. Other findings: Stranding within the small bowel mesenteric fat, nonspecific, possibly related to  mesenteric panniculitis. No free air or free fluid is identified. No pathologically enlarged lymph nodes are seen. The abdominal aorta and IVC appear unremarkable. Bones and soft tissues: No acute or suspicious osseous or soft tissue lesion is identified.     Impression: 1.Suboptimal contrast bolus timing limits evaluation of the small peripheral pulmonary arteries. No large central pulmonary embolism is seen. 2.No acute abnormality is identified within the chest, abdomen, or pelvis. 3.Enlarged, fatty liver. 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 5.Probable 5 cm fibroid within the right uterus. 6.Additional findings as detailed above. Electronically Signed: Dilshad North MD  6/9/2025 11:36 AM EDT  Workstation ID: [id number]    XR Chest 1 View  Result Date: 6/9/2025  XR CHEST 1 VW Date of Exam: 6/9/2025 9:19 AM EDT Indication: elevated BNP Comparison: None available. Findings: Cardiomediastinal silhouette is unremarkable.  No airspace disease, pneumothorax, nor pleural effusion. No acute osseous abnormality identified.     Impression: Impression: No acute cardiopulmonary process. Electronically Signed: Dale Combs MD  6/9/2025 9:41 AM EDT  Workstation ID: [id number]    CT Head Without Contrast  Result Date: 6/9/2025  CT HEAD WO CONTRAST Date of Exam: 6/9/2025 8:40 AM EDT Indication: ams. Comparison: None available. Technique: Axial CT images were obtained of the head without contrast administration.  Automated exposure control and iterative construction methods were used. Findings: No acute intracranial hemorrhage or extra-axial collection is identified. The ventricles appear normal in caliber, with no evidence of mass effect or midline shift. The basal cisterns appear patent. The gray-white differentiation appears preserved. The calvarium appear intact. There is moderate frothy paranasal sinus mucosal disease. The mastoid air cells are  well-aerated.     Impression: Impression: 1.No acute intracranial process identified. 2.Moderate frothy paranasal sinus mucosal disease. Correlate for acute sinusitis. Electronically Signed: Sohail Reyes MD  6/9/2025 9:06 AM EDT  Workstation ID: [id number]          Current medications:  Scheduled Meds:levothyroxine, 75 mcg, Oral, Q AM  sodium chloride, 10 mL, Intravenous, Q12H  valsartan, 160 mg, Oral, Daily      Continuous Infusions:niCARdipine, 5-15 mg/hr, Last Rate: 12.5 mg/hr (06/10/25 0517)  sodium chloride, 100 mL/hr, Last Rate: 100 mL/hr (06/10/25 0216)      PRN Meds:.  acetaminophen **OR** acetaminophen **OR** acetaminophen    senna-docusate sodium **AND** polyethylene glycol **AND** bisacodyl **AND** bisacodyl    Calcium Replacement - Follow Nurse / BPA Driven Protocol    Magnesium Standard Dose Replacement - Follow Nurse / BPA Driven Protocol    melatonin    ondansetron    Phosphorus Replacement - Follow Nurse / BPA Driven Protocol    Potassium Replacement - Follow Nurse / BPA Driven Protocol    prochlorperazine    promethazine    Sodium Chloride (PF)    sodium chloride    sodium chloride    temazepam    Assessment & Plan   Assessment & Plan     Active Hospital Problems    Diagnosis  POA    **Intractable vomiting with nausea [R11.2]  Yes    HTN (hypertension) [I10]  Yes    Rheumatoid arthritis [M06.9]  Yes    Hypertensive crisis [I16.9]  Yes      Resolved Hospital Problems   No resolved problems to display.        Brief Hospital Course to date:  Barbara Phillips is a 49 y.o. female w/ HTN, hypothyroid, rheumatoid/psoriatic arthritis, morbid obesity, holding her MTX for planned TKA and developed visual hallucinations ~4 days pta, then the night before arrival developed intractable N/V followed by a headache and was found to have BP of 237/139.    Plan:      Intractable N/V  Headache  Visual hallucinations  Hypertensive crisis  - preliminary initial labs/imaging w/o evidence for infectious process  - cont  levi gtt, cont home ARB  -- unable to wean gtt, add Coreg   - inflammatory markers (CRP/ESR) elevated (1.5/53, respectively)   - checked MRI to r/o PRES- no vasogenic edema noted, however, concern for demyelinating disease.  Will consult general Neuro.   - prn antiemetics  - IVF support  - single dose IV hydromorphone for severe headache (tylenol ineffective, can't have NSAIDS prior to planned TKA, and N/V preclude PO pain med)    ? Mesenteric Panniculitis  -- noted on CT A/P, however, no other s/s of active infection. Monitor off ABX for now      Hypothyroidism   - cont home levothyroxine    RA (reports rheumatoid/psoriatic arthritis)   - typically takes MTX on Fridays, last dose 5/30/25 in plan for surgery    Morbid Obesity  -- complicates all aspects of care     Total time spent: Time Spent: Time Spent: 35 minutes  Time spent includes time reviewing chart, face-to-face time, counseling patient/family/caregiver, ordering medications/tests/procedures, communicating with other health care professionals, documenting clinical information in the electronic health record, and coordination of care.      Expected Discharge Location and Transportation: home once BP controlled on oral meds   Expected Discharge   Expected Discharge Date: 6/11/2025; Expected Discharge Time:      VTE Prophylaxis:  Mechanical VTE prophylaxis orders are present.         AM-PAC 6 Clicks Score (PT): 24 (06/10/25 0200)    CODE STATUS:   Code Status and Medical Interventions: CPR (Attempt to Resuscitate); Full Support   Ordered at: 06/09/25 1400     Code Status (Patient has no pulse and is not breathing):    CPR (Attempt to Resuscitate)     Medical Interventions (Patient has pulse or is breathing):    Full Support       Rocío Mehta MD  06/10/25       Electronically signed by Rocío Mehta MD at 06/10/25 1226          Consult Notes (all)        Vidal Weldon DO at 06/10/25 1005        Consult Orders    1. Inpatient Neurology  Consult General [[id number]] ordered by Rocío Mehta MD at 06/10/25 0846                 [facility] Neurology  Consult Note    Patient Name: Barbara Phillips  : 1975  MRN: [id number]  Primary Care Physician:  Matilda Almendarez MD  Referring Physician: No ref. provider found  Date of admission: 2025    Subjective     Reason for Consult: MRI concerning for demyelinating process versus vasculitis    Barbara Phillips is a 49 y.o. female with history of rheumatoid arthritis, hypertension, hypothyroidism who presented to [facility] ED on  with vomiting, shaking and leg pain.    Patient developed visual hallucinations 4 days prior to admission.  She was seeing colors, people that were not there, bugs crawling on her hands.  The night before coming to the ER she developed intractable nausea and vomiting followed by headache.  BP on arrival was 237/139.  CT head was unremarkable.     Nausea and vomiting have resolved.  Blood pressure is now controlled.    Neurology was asked to see the patient after MRI was obtained to rule out PRES.  MRI showed findings concerning for possible demyelination or vasculitis.  Patient has never had any neurologic symptoms, including vision loss, painful eye movements, unilateral numbness or weakness.    Review Of Systems   Constitutional:   Cardiovascular: Negative for chest pain or palpitations.  Respiratory: Negative for shortness of breath or cough.  Gastrointestinal: Positive for nausea and vomiting.  Genitourinary: Negative for bladder incontinence.  Musculoskeletal: Negative for aches and pains in the muscles or joints.  Dermatological: Negative for skin breakdown.   Neurological: Negative for headache, pain, weakness or vision changes.     Personal History     Past Medical History:   Diagnosis Date    Hypertension     Hypothyroidism     Rheumatoid arthritis        Past Surgical History:   Procedure Laterality Date    APPENDECTOMY       SECTION       CHOLECYSTECTOMY      TONSILLECTOMY         Family History: family history is not on file. Otherwise pertinent FHx was reviewed and not pertinent to current issue.    Social History:  reports that she has never smoked. She has never been exposed to tobacco smoke. She has never used smokeless tobacco. She reports that she does not drink alcohol and does not use drugs.    Home Medications:   celecoxib, levothyroxine, methotrexate, valsartan, and vitamin D    Current Medications:     Current Facility-Administered Medications:     acetaminophen (TYLENOL) tablet 650 mg, 650 mg, Oral, Q4H PRN, 650 mg at 06/10/25 0518 **OR** acetaminophen (TYLENOL) 160 MG/5ML oral solution 650 mg, 650 mg, Oral, Q4H PRN **OR** acetaminophen (TYLENOL) suppository 650 mg, 650 mg, Rectal, Q4H PRN, Tutu Gay DO    sennosides-docusate (PERICOLACE) 8.6-50 MG per tablet 2 tablet, 2 tablet, Oral, BID PRN **AND** polyethylene glycol (MIRALAX) packet 17 g, 17 g, Oral, Daily PRN **AND** bisacodyl (DULCOLAX) EC tablet 5 mg, 5 mg, Oral, Daily PRN **AND** bisacodyl (DULCOLAX) suppository 10 mg, 10 mg, Rectal, Daily PRN, Tutu Gay DO    Calcium Replacement - Follow Nurse / BPA Driven Protocol, , Not Applicable, PRN, Tutu Gay DO    HYDROcodone-acetaminophen (NORCO) 5-325 MG per tablet 1 tablet, 1 tablet, Oral, Q12H PRN, Rocío Mehta MD, 1 tablet at 06/10/25 0919    levothyroxine (SYNTHROID, LEVOTHROID) tablet 75 mcg, 75 mcg, Oral, Q AM, Tutu Gay DO, 75 mcg at 06/10/25 0626    Magnesium Standard Dose Replacement - Follow Nurse / BPA Driven Protocol, , Not Applicable, PRN, Tutu Gay DO    melatonin tablet 5 mg, 5 mg, Oral, Nightly PRN, Tutu Gay DO, 5 mg at 06/09/25 2145    niCARdipine (CARDENE) 25mg in 250mL NS infusion, 5-15 mg/hr, Intravenous, Titrated, Nancy Clancy APRN, Last Rate: 50 mL/hr at 06/10/25 0957, 5 mg/hr at 06/10/25 0957    ondansetron (ZOFRAN) injection 4  mg, 4 mg, Intravenous, Q6H PRN, Tutu Gay, DO, 4 mg at 06/10/25 0212    Phosphorus Replacement - Follow Nurse / BPA Driven Protocol, , Not Applicable, PRN, Tutu Gay, DO    Potassium Replacement - Follow Nurse / BPA Driven Protocol, , Not Applicable, PRN, Tutu Gay, DO    prochlorperazine (COMPAZINE) injection 5 mg, 5 mg, Intravenous, Q6H PRN, Tutu Gay, DO, 5 mg at 06/09/25 1505    promethazine (PHENERGAN) 12.5 mg in sodium chloride 0.9 % 50 mL, 12.5 mg, Intravenous, Q6H PRN, Tutu Gay DO, 12.5 mg at 06/09/25 1523    Sodium Chloride (PF) 0.9 % 10 mL, 10 mL, Intravenous, PRN, Dale Nick MD    sodium chloride 0.9 % flush 10 mL, 10 mL, Intravenous, Q12H, Tutu Gay, DO, 10 mL at 06/09/25 2146    sodium chloride 0.9 % flush 10 mL, 10 mL, Intravenous, PRN, Tutu Gay,     sodium chloride 0.9 % infusion 40 mL, 40 mL, Intravenous, PRN, Tutu Gay,     sodium chloride 0.9 % infusion, 100 mL/hr, Intravenous, Continuous, Tutu Gay, , Last Rate: 100 mL/hr at 06/10/25 0522, 100 mL/hr at 06/10/25 0522    temazepam (RESTORIL) capsule 15 mg, 15 mg, Oral, Nightly PRN, Tutu Gay,     valsartan (DIOVAN) tablet 160 mg, 160 mg, Oral, Daily, Tutu Gay, , 160 mg at 06/10/25 0919     Allergies:  Allergies   Allergen Reactions    Morphine And Codeine        Objective     Vitals:  Temp:  [98.3 °F (36.8 °C)-98.5 °F (36.9 °C)] 98.3 °F (36.8 °C)  Heart Rate:  [] 114  Resp:  [16-18] 18  BP: (117-215)/() 128/65  Flow (L/min) (Oxygen Therapy):  [2] 2    Neurologic Exam   Mental status/Cognition: Awake and alert  Speech/language: fluent; follows commands    Cranial nerves: PERRL. EOMI. Face appears symmetric. No dysarthria.  Shoulder shrug symmetric.  Tongue protrudes midline    Motor: No drift in any extremities, able to raise all to antigravity.    Sensation: intact to light touch  "throughout    Reflexes: 2+ at bilateral biceps, brachioradialis, patellas, achilles. Toes down.     Coordination/Complex Motor: Finger-to-nose intact bilaterally without dysmetria        Laboratory Results:   Lab Results   Component Value Date    GLUCOSE 153 (H) 06/09/2025    CALCIUM 10.0 06/09/2025     06/09/2025    K 4.5 06/09/2025    CO2 18.0 (L) 06/09/2025     06/09/2025    BUN 13.6 06/09/2025    CREATININE 0.97 06/09/2025    BCR 14.0 06/09/2025    ANIONGAP 20.0 (H) 06/09/2025     Lab Results   Component Value Date    WBC 9.47 06/10/2025    HGB 12.3 06/10/2025    HCT 37.8 06/10/2025    MCV 91.5 06/10/2025     06/10/2025     No results found for: \"CHOL\"  No results found for: \"HDL\"  No results found for: \"LDL\"  No results found for: \"TRIG\"  Lab Results   Component Value Date    HGBA1C 6.00 (H) 10/17/2024     No results found for: \"[id number]\"  No results found for: \"FOLATE\"    Blood culture NGTD    TSH 9.89  Free T4 0.94    CRP 1.5  Lactate 1.8 down from 3.2  Pro-Kvng 0.08  Sed rate 53    UA noninfectious    Images/Procedures   CT head 6/9/2025  No acute findings    MRI brain without contrast 6/9/2025  White matter changes likely demyelination disease with differential considerations including sequelae of prior inflammation or infection, vasculitis, chronic small vessel ischemic change    Assessment / Plan   Active Hospital Problems:  Concern for demyelination versus vasculitis versus chronic small vessel disease    Brief Patient Summary:  Barbara Phillips is a 49 y.o. female with history of rheumatoid arthritis, hypertension, hypothyroidism who presented to [facility] ED on 6/9 with vomiting, shaking and leg pain.  Neurology was consulted after MRI brain was obtained to rule out PRES that showed possible demyelination.  Patient denies prior or current neurologic symptoms.  Her exam is intact, no signs of hyperreflexia or upgoing toes, strength is equal bilaterally.    Her MRI does have features suggestive " of demyelination, likely mixed with some small vessel disease.  However it was done without contrast which is needed to confirm active demyelination.  She is not having any current neurologic symptoms, but would recommend repeating with contrast to look for any areas of active demyelination.  Ultimately she will need a lumbar puncture to look for oligoclonal bands to confirm the diagnosis of multiple sclerosis, but she would rather defer this to the outpatient setting.    Her visual hallucinations are likely due to hypertensive encephalopathy.     Plan:   - MRI brain with contrast  - MRI cervical spine with and without contrast  - Recommend CSF evaluation for MS outpatient  - Follow-up outpatient neurology, J[facility] OBREGON in 1 to 2 months    I have discussed the above with the patient, family, hospitalist  Time spent with patient: 60 minutes in face-to-face evaluation and management of the patient.       Vidal Weldon DO            Electronically signed by Vidal Weldon DO at 06/10/25 1493

## 2025-06-11 NOTE — CONSULTS
Chart review for diabetes educator consult.     At the time of this review patient A1c is 6.3 , they have no noted history of diabetes and no home medications noted for treatment of diabetes. At this time we do not feel the patient would benefit from diabetes education. Thank you for this consult, should patient needs change please re consult us.

## 2025-06-11 NOTE — CASE MANAGEMENT/SOCIAL WORK
Continued Stay Note  [facility]     Patient Name: Barbara Phillips  MRN: [id number]  Today's Date: 6/11/2025    Admit Date: 6/9/2025        Discharge Plan       Row Name 06/11/25 1343       Plan    Plan Comments Meet with patient at bedside, [facility] home health. Notifed .                   Discharge Codes    No documentation.                 Expected Discharge Date and Time       Expected Discharge Date Expected Discharge Time    Jun 11, 2025               Annalisa Beckman

## 2025-06-11 NOTE — PROGRESS NOTES
Discharge Planning Assessment  [facility]     Patient Name: Barbara Phillips  MRN: [id number]  Today's Date: 6/11/2025    Admit Date: 6/9/2025        Discharge Needs Assessment    No documentation.                  Discharge Plan       Row Name 06/11/25 1006       Plan    Plan Comments Mrs. Phillips lives in [facility] and she has [place] [employer]. She has a primary care provider Matilda Almendarez and her prescriptions are going to be filled through meds to bed. Case management is following for discharge planning needs.    Final Discharge Disposition Code 01 - home or self-care                    Expected Discharge Date and Time       Expected Discharge Date Expected Discharge Time    Jun 11, 2025            Demographic Summary       Row Name 06/11/25 1006       General Information    Arrived From home    Referral Source patient    Reason for Consult discharge planning    Preferred Language English       Contact Information    Permission Granted to Share Info With     Contact Information Obtained for                    Functional Status    No documentation.                  Psychosocial    No documentation.                  Abuse/Neglect    No documentation.                  Legal    No documentation.                  Substance Abuse    No documentation.                  Patient Forms    No documentation.                     MARIANNE Franklin

## 2025-06-11 NOTE — PLAN OF CARE
Problem: Adult Inpatient Plan of Care  Goal: Plan of Care Review  Outcome: Progressing  Goal: Patient-Specific Goal (Individualized)  Outcome: Progressing  Goal: Absence of Hospital-Acquired Illness or Injury  Outcome: Progressing  Intervention: Identify and Manage Fall Risk  Recent Flowsheet Documentation  Taken 6/11/2025 0600 by Isabel Hunter RN  Safety Promotion/Fall Prevention:   safety round/check completed   fall prevention program maintained   activity supervised  Taken 6/11/2025 0400 by Isabel Hunter RN  Safety Promotion/Fall Prevention: safety round/check completed  Taken 6/11/2025 0200 by Isabel Hunter RN  Safety Promotion/Fall Prevention:   safety round/check completed   fall prevention program maintained   assistive device/personal items within reach  Taken 6/11/2025 0000 by Isabel Hunter RN  Safety Promotion/Fall Prevention: (mri) patient off unit  Taken 6/10/2025 2200 by Isabel Hunter RN  Safety Promotion/Fall Prevention:   safety round/check completed   fall prevention program maintained   assistive device/personal items within reach  Taken 6/10/2025 2000 by Isabel Hunter RN  Safety Promotion/Fall Prevention:   safety round/check completed   fall prevention program maintained   assistive device/personal items within reach  Intervention: Prevent Skin Injury  Recent Flowsheet Documentation  Taken 6/11/2025 0600 by Isabel Hunter RN  Body Position: position changed independently  Skin Protection:   incontinence pads utilized   transparent dressing maintained  Taken 6/11/2025 0400 by Isabel Hunter RN  Body Position: position changed independently  Skin Protection:   incontinence pads utilized   transparent dressing maintained  Taken 6/11/2025 0200 by Isabel Hunter RN  Body Position: position changed independently  Skin Protection:   incontinence pads utilized   transparent dressing maintained  Taken 6/10/2025 2200 by Isabel Hunter RN  Body Position: position changed independently  Skin  Protection:   incontinence pads utilized   transparent dressing maintained  Taken 6/10/2025 2000 by Isabel Hunter RN  Body Position: position changed independently  Skin Protection:   incontinence pads utilized   transparent dressing maintained  Intervention: Prevent Infection  Recent Flowsheet Documentation  Taken 6/11/2025 0600 by Isabel Hunter RN  Infection Prevention:   environmental surveillance performed   single patient room provided   rest/sleep promoted  Taken 6/11/2025 0400 by Iasbel Hunter RN  Infection Prevention:   environmental surveillance performed   single patient room provided   rest/sleep promoted  Taken 6/11/2025 0200 by Isabel Hunter RN  Infection Prevention:   environmental surveillance performed   single patient room provided   rest/sleep promoted  Taken 6/10/2025 2200 by Isabel Hunter RN  Infection Prevention:   environmental surveillance performed   single patient room provided   rest/sleep promoted  Taken 6/10/2025 2000 by Isabel Hunter RN  Infection Prevention:   environmental surveillance performed   single patient room provided   rest/sleep promoted  Goal: Optimal Comfort and Wellbeing  Outcome: Progressing  Intervention: Monitor Pain and Promote Comfort  Recent Flowsheet Documentation  Taken 6/11/2025 0600 by Isabel Hunter RN  Pain Management Interventions: pain medication given  Taken 6/10/2025 2200 by Isabel Hunter RN  Pain Management Interventions: pillow support provided  Taken 6/10/2025 2117 by Isabel Hunter RN  Pain Management Interventions: pain medication given  Taken 6/10/2025 2000 by Isabel Hunter RN  Pain Management Interventions: pain medication given  Intervention: Provide Person-Centered Care  Recent Flowsheet Documentation  Taken 6/11/2025 0600 by Isabel Hunter RN  Trust Relationship/Rapport:   care explained   choices provided  Taken 6/11/2025 0400 by Isabel Hunter RN  Trust Relationship/Rapport:   care explained   choices provided  Taken 6/10/2025 2200 by  Isabel Hunter RN  Trust Relationship/Rapport:   care explained   choices provided  Taken 6/10/2025 2000 by Isabel Hunter RN  Trust Relationship/Rapport:   care explained   choices provided  Goal: Readiness for Transition of Care  Outcome: Progressing     Problem: Comorbidity Management  Goal: Blood Pressure in Desired Range  Outcome: Progressing  Intervention: Maintain Blood Pressure Management  Recent Flowsheet Documentation  Taken 6/11/2025 0600 by Isabel Hunter RN  Medication Review/Management: medications reviewed  Taken 6/11/2025 0400 by Isabel Hunter RN  Medication Review/Management: medications reviewed  Taken 6/11/2025 0200 by Isabel Hunter RN  Medication Review/Management: medications reviewed  Taken 6/10/2025 2200 by Isabel Hunter RN  Medication Review/Management: medications reviewed     Problem: Sepsis/Septic Shock  Goal: Optimal Coping  Outcome: Progressing  Intervention: Support Patient and Family Response  Recent Flowsheet Documentation  Taken 6/10/2025 2200 by Isabel Hunter RN  Supportive Measures: self-care encouraged  Taken 6/10/2025 2000 by Isabel Hunter RN  Supportive Measures: self-care encouraged  Goal: Absence of Bleeding  Outcome: Progressing  Goal: Blood Glucose Level Within Target Range  Outcome: Progressing  Goal: Absence of Infection Signs and Symptoms  Outcome: Progressing  Intervention: Initiate Sepsis Management  Recent Flowsheet Documentation  Taken 6/11/2025 0600 by Isabel Hunter RN  Infection Prevention:   environmental surveillance performed   single patient room provided   rest/sleep promoted  Taken 6/11/2025 0400 by Isabel Hunter RN  Infection Prevention:   environmental surveillance performed   single patient room provided   rest/sleep promoted  Taken 6/11/2025 0200 by Isabel Hunter RN  Infection Prevention:   environmental surveillance performed   single patient room provided   rest/sleep promoted  Taken 6/10/2025 2200 by Isabel Hunter RN  Infection Prevention:    environmental surveillance performed   single patient room provided   rest/sleep promoted  Taken 6/10/2025 2000 by Isabel Hunter, RN  Infection Prevention:   environmental surveillance performed   single patient room provided   rest/sleep promoted  Intervention: Promote Recovery  Recent Flowsheet Documentation  Taken 6/11/2025 0600 by Isabel Hunter, RN  Activity Management:   activity encouraged   back to bed   ambulated to bathroom  Taken 6/11/2025 0400 by Isabel Hunter, RN  Activity Management: activity encouraged  Sleep/Rest Enhancement:   room darkened   relaxation techniques promoted   regular sleep/rest pattern promoted  Taken 6/11/2025 0200 by Isabel Hunter, RN  Activity Management: activity encouraged  Sleep/Rest Enhancement:   room darkened   regular sleep/rest pattern promoted  Taken 6/11/2025 0134 by Isabel Hunter, RN  Activity Management:   ambulated to bathroom   back to bed  Taken 6/10/2025 2200 by Isabel Hunter, RN  Activity Management: activity encouraged  Sleep/Rest Enhancement:   awakenings minimized   room darkened  Taken 6/10/2025 2000 by Isabel Hunter, RN  Activity Management: activity encouraged  Sleep/Rest Enhancement: awakenings minimized  Goal: Optimal Nutrition Delivery  Outcome: Progressing

## 2025-06-12 ENCOUNTER — APPOINTMENT (OUTPATIENT)
Dept: CARDIOLOGY | Facility: HOSPITAL | Age: 50
DRG: 065 | End: 2025-06-12
Payer: COMMERCIAL

## 2025-06-12 PROCEDURE — 97161 PT EVAL LOW COMPLEX 20 MIN: CPT

## 2025-06-12 PROCEDURE — 99233 SBSQ HOSP IP/OBS HIGH 50: CPT | Performed by: STUDENT IN AN ORGANIZED HEALTH CARE EDUCATION/TRAINING PROGRAM

## 2025-06-12 PROCEDURE — 93306 TTE W/DOPPLER COMPLETE: CPT | Performed by: INTERNAL MEDICINE

## 2025-06-12 PROCEDURE — 93306 TTE W/DOPPLER COMPLETE: CPT

## 2025-06-12 PROCEDURE — 25010000002 HYDRALAZINE PER 20 MG: Performed by: INTERNAL MEDICINE

## 2025-06-12 PROCEDURE — 92523 SPEECH SOUND LANG COMPREHEN: CPT

## 2025-06-12 PROCEDURE — 97165 OT EVAL LOW COMPLEX 30 MIN: CPT

## 2025-06-12 PROCEDURE — 99232 SBSQ HOSP IP/OBS MODERATE 35: CPT | Performed by: INTERNAL MEDICINE

## 2025-06-12 RX ORDER — ATORVASTATIN CALCIUM 80 MG/1
80 TABLET, FILM COATED ORAL NIGHTLY
Qty: 30 TABLET | Refills: 0 | Status: SHIPPED | OUTPATIENT
Start: 2025-06-12

## 2025-06-12 RX ORDER — HYDRALAZINE HYDROCHLORIDE 20 MG/ML
10 INJECTION INTRAMUSCULAR; INTRAVENOUS EVERY 6 HOURS PRN
Status: DISCONTINUED | OUTPATIENT
Start: 2025-06-12 | End: 2025-06-13 | Stop reason: HOSPADM

## 2025-06-12 RX ORDER — AMLODIPINE BESYLATE 10 MG/1
10 TABLET ORAL
Qty: 30 TABLET | Refills: 0 | Status: SHIPPED | OUTPATIENT
Start: 2025-06-13

## 2025-06-12 RX ORDER — AMLODIPINE BESYLATE 5 MG/1
5 TABLET ORAL ONCE
Status: COMPLETED | OUTPATIENT
Start: 2025-06-12 | End: 2025-06-12

## 2025-06-12 RX ORDER — VALSARTAN 320 MG/1
320 TABLET ORAL DAILY
Qty: 30 TABLET | Refills: 0 | Status: SHIPPED | OUTPATIENT
Start: 2025-06-13

## 2025-06-12 RX ORDER — ASPIRIN 81 MG/1
81 TABLET ORAL DAILY
Qty: 30 TABLET | Refills: 0 | Status: SHIPPED | OUTPATIENT
Start: 2025-06-12

## 2025-06-12 RX ORDER — AMLODIPINE BESYLATE 10 MG/1
10 TABLET ORAL
Status: DISCONTINUED | OUTPATIENT
Start: 2025-06-13 | End: 2025-06-13 | Stop reason: HOSPADM

## 2025-06-12 RX ADMIN — Medication 10 ML: at 20:10

## 2025-06-12 RX ADMIN — HYDRALAZINE HYDROCHLORIDE 10 MG: 20 INJECTION INTRAMUSCULAR; INTRAVENOUS at 18:40

## 2025-06-12 RX ADMIN — AMLODIPINE BESYLATE 5 MG: 5 TABLET ORAL at 10:34

## 2025-06-12 RX ADMIN — ACETAMINOPHEN 650 MG: 325 TABLET ORAL at 13:18

## 2025-06-12 RX ADMIN — HYDROCODONE BITARTRATE AND ACETAMINOPHEN 1 TABLET: 5; 325 TABLET ORAL at 18:39

## 2025-06-12 RX ADMIN — VALSARTAN 320 MG: 160 TABLET, FILM COATED ORAL at 10:34

## 2025-06-12 RX ADMIN — Medication 10 ML: at 10:28

## 2025-06-12 RX ADMIN — LEVOTHYROXINE SODIUM 75 MCG: 0.03 TABLET ORAL at 05:59

## 2025-06-12 RX ADMIN — AMLODIPINE BESYLATE 5 MG: 5 TABLET ORAL at 15:34

## 2025-06-12 RX ADMIN — ATORVASTATIN CALCIUM 80 MG: 40 TABLET, FILM COATED ORAL at 20:10

## 2025-06-12 RX ADMIN — HYDRALAZINE HYDROCHLORIDE 10 MG: 20 INJECTION INTRAMUSCULAR; INTRAVENOUS at 23:30

## 2025-06-12 RX ADMIN — ASPIRIN 325 MG: 325 TABLET ORAL at 10:34

## 2025-06-12 RX ADMIN — Medication 5 MG: at 21:07

## 2025-06-12 NOTE — THERAPY DISCHARGE NOTE
Acute Care - Speech Language Pathology   Initial Evaluation  [facility]    Cognitive-Communication Evaluation       Patient Name: Barbara Phillips  : 1975  MRN: [id number]  Today's Date: 2025               Admit Date: 2025     Visit Dx:    ICD-10-CM ICD-9-CM   1. Hypertensive urgency  I16.0 401.9   2. Hallucination  R44.3 780.1   3. Nausea and vomiting, unspecified vomiting type  R11.2 787.01   4. Tremors of nervous system  R25.1 781.0     Patient Active Problem List   Diagnosis    Vitamin D deficiency    HTN (hypertension)    Intractable vomiting with nausea    Rheumatoid arthritis    Hypertensive crisis    Hypertensive emergency     Past Medical History:   Diagnosis Date    Hypertension     Hypothyroidism     Rheumatoid arthritis      Past Surgical History:   Procedure Laterality Date    APPENDECTOMY       SECTION      CHOLECYSTECTOMY      TONSILLECTOMY         SLP Recommendation and Plan  SLP Diagnosis: functional speech/language skills, functional cognitive-linguistic skills (25 1500)              [facility] Criteria for Skilled Therapy Interventions Met: no problems identified which require skilled intervention (25 1500)  Anticipated Discharge Disposition (SLP): No further SLP services warranted (25 1500)        SLP EVALUATION (Last 72 Hours)       SLP [facility] Evaluation       Row Name 25 1500                   Communication Assessment/Intervention    Document Type evaluation;discharge evaluation/summary  -        Patient Observations alert;cooperative;agree to therapy  -        Patient/Family/Caregiver Comments/Observations family present  -        Patient Effort excellent  -           General Information    Patient Profile Reviewed yes  -        Plans/Goals Discussed with patient;agreed upon  -        Barriers to Rehab none identified  -           Pain Scale: FACES Pre/Post-Treatment    Pain: FACES Scale, Pretreatment 0-->no hurt  -        Posttreatment Pain  Rating 0-->no hurt  -           Comprehension Assessment/Intervention    Comprehension Assessment/Intervention Auditory Comprehension;Reading Comprehension  -           Auditory Comprehension Assessment/Intervention    Auditory Comprehension (Communication) WFL  -        Narrative Discourse L  -           Reading Comprehension Assessment/Intervention    Reading Comprehension (Communication) WFL  -        Paragraph Level WFL  -           Expression Assessment/Intervention    Expression Assessment/Intervention verbal expression;graphic expression  -           Verbal Expression Assessment/Intervention    Verbal Expression [facility]  -        Conversational Discourse/Fluency WF  -           Graphic Expression Assessment/Intervention    Graphic Expression [facility]  -        Sentence Formulation [facility]  -           Oral Musculature and Cranial Nerve Assessment    Oral Motor General Assessment L  -           Motor Speech Assessment/Intervention    Motor Speech Function [facility]  -        Conversational Speech (Communication) [facility]  -        Speech intelligibility 100%;in quiet environment;in connected speech;with unfamiliar listener  -           Cursory Voice Assessment/Intervention    Quality and Resonance (Voice) L  -           Cognitive Assessment Intervention- SLP    Cognitive Function (Cognition) L  -        Orientation Status (Cognition) [facility]  -        Memory (Cognitive) WFL  -        Attention (Cognitive) L  -        Thought Organization (Cognitive) WFL  -        Reasoning (Cognitive) WFL  -        Problem Solving (Cognitive) L  -        Functional Math (Cognitive) L  -        Executive Function (Cognition) L  -        Pragmatics (Communication) [facility]  -           SLP Evaluation Clinical Impressions    SLP Diagnosis functional speech/language skills;functional cognitive-linguistic skills  -[facility] Criteria for Skilled Therapy Interventions Met no problems identified  which require skilled intervention  -        Functional Impact no impact on function  -           Recommendations    Anticipated Discharge Disposition (SLP) No further SLP services warranted  -           SLP Discharge Summary    Discharge Destination home  -        Discharge Diagnostic Statement no deficits identified with speech, language or cognition at this time  -        Progress Toward Achieving Short/long Term Goals discharge on same date as initial evaluation  -                  User Key  (r) = Recorded By, (t) = Taken By, (c) = Cosigned By      Initials Name Effective Dates    Michelle Andrew MS CCC-SLP 01/20/25 -                        EDUCATION  The patient has been educated in the following areas:     Cognitive Impairment Communication Impairment.    Time Calculation:      Time Calculation- SLP       Row Name 06/12/25 1548             Time Calculation- SLP    SLP Start Time 1500  -CH      SLP Received On 06/12/25  -         Untimed Charges    SLP Eval/Re-eval  ST Eval Speech and Production w/ Language - 79519  -CH      74635-[id number] Eval Speech and Production w/ Language Minutes 39  -CH         Total Minutes    Untimed Charges Total Minutes 39  -CH       Total Minutes 39  -CH                User Key  (r) = Recorded By, (t) = Taken By, (c) = Cosigned By      Initials Name Provider Type     Michelle Jenkins MS CCC-SLP Speech and Language Pathologist                    Therapy Charges for Today       Code Description Service Date Service Provider Modifiers Qty    [id number] HC ST EVAL SPEECH AND PROD W LANG  3 6/12/2025 Michelle Jenkins MS CCC-SLP GN 1            Michelle Jenkins MS CCC-SLP  6/12/2025

## 2025-06-12 NOTE — PROGRESS NOTES
[facility]    Progress Note    Patient Name: Barbara Phillips  : 1975  MRN: [id number]  Primary Care Physician:  Matilda Almendarez MD  Date of admission: 2025    Subjective     Reason for consult: MRI concerning for demyelinating process versus vasculitis     History of Present Illness   Doing well, ready to go home    Review of Systems   General: Negative for fever, nausea, or vomiting.   Neurological: Negative for headache, pain, or weakness.     Objective     Vitals:   Temp:  [98 °F (36.7 °C)-99.2 °F (37.3 °C)] 99.2 °F (37.3 °C)  Heart Rate:  [] 100  Resp:  [16-19] 18  BP: (147-181)/() 168/107  Flow (L/min) (Oxygen Therapy):  [2-3] 3    Neurologic Exam   Mental status/Cognition: Awake and alert  Speech/language: fluent    Cranial nerves: Tracks examiner. Face appears symmetric. No dysarthria.      Motor: Moves all extremities        Current Medications    Current Facility-Administered Medications:     acetaminophen (TYLENOL) tablet 650 mg, 650 mg, Oral, Q4H PRN, 650 mg at 257 **OR** acetaminophen (TYLENOL) suppository 650 mg, 650 mg, Rectal, Q4H PRN, Vidal Weldon DO    aluminum-magnesium hydroxide-simethicone (MAALOX MAX) 400-400-40 MG/5ML suspension 7.5 mL, 7.5 mL, Oral, Q4H PRN, Vidal Weldon DO    amLODIPine (NORVASC) tablet 5 mg, 5 mg, Oral, Q24H, Olga Lidia Baeza II, DO, 5 mg at 25 0922    aspirin tablet 325 mg, 325 mg, Oral, Daily, 325 mg at 25 1211 **OR** aspirin suppository 300 mg, 300 mg, Rectal, Daily, Vidal Weldon,     atorvastatin (LIPITOR) tablet 80 mg, 80 mg, Oral, Nightly, Vidal Weldon, DO, 80 mg at 25    sennosides-docusate (PERICOLACE) 8.6-50 MG per tablet 2 tablet, 2 tablet, Oral, BID PRN **AND** polyethylene glycol (MIRALAX) packet 17 g, 17 g, Oral, Daily PRN **AND** bisacodyl (DULCOLAX) EC tablet 5 mg, 5 mg, Oral, Daily PRN **AND** [DISCONTINUED] bisacodyl (DULCOLAX) suppository 10 mg, 10 mg, Rectal, Daily PRN,  Tutu Gay DO    bisacodyl (DULCOLAX) suppository 10 mg, 10 mg, Rectal, Daily PRN, Vidal Weldon DO    Calcium Replacement - Follow Nurse / BPA Driven Protocol, , Not Applicable, PRN, Tutu Gay DO    HYDROcodone-acetaminophen (NORCO) 5-325 MG per tablet 1 tablet, 1 tablet, Oral, Q12H PRN, Rocío Mehta MD, 1 tablet at 06/11/25 1638    levothyroxine (SYNTHROID, LEVOTHROID) tablet 75 mcg, 75 mcg, Oral, Q AM, Tutu Gay DO, 75 mcg at 06/12/25 0559    Magnesium Standard Dose Replacement - Follow Nurse / BPA Driven Protocol, , Not Applicable, PRN, Tutu Gay DO    melatonin tablet 5 mg, 5 mg, Oral, Nightly PRN, Tutu Gay DO, 5 mg at 06/11/25 2242    niCARdipine (CARDENE) 25mg in 250mL NS infusion, 5-15 mg/hr, Intravenous, Titrated, Nancy Clancy, APRN, Stopped at 06/11/25 1121    ondansetron (ZOFRAN) injection 4 mg, 4 mg, Intravenous, Q6H PRN, Vidal Weldon DO    Phosphorus Replacement - Follow Nurse / BPA Driven Protocol, , Not Applicable, PRN, Tutu Gay DO    Potassium Replacement - Follow Nurse / BPA Driven Protocol, , Not Applicable, PRN, Tutu Gay DO    prochlorperazine (COMPAZINE) injection 5 mg, 5 mg, Intravenous, Q6H PRN, Tutu Gay DO, 5 mg at 06/11/25 2242    promethazine (PHENERGAN) 12.5 mg in sodium chloride 0.9 % 50 mL, 12.5 mg, Intravenous, Q6H PRN, Tutu Gay DO, 12.5 mg at 06/09/25 1523    Sodium Chloride (PF) 0.9 % 10 mL, 10 mL, Intravenous, PRN, Dale Nick MD    sodium chloride 0.9 % flush 10 mL, 10 mL, Intravenous, Q12H, Tutu Gay, DO, 10 mL at 06/11/25 2243    sodium chloride 0.9 % flush 10 mL, 10 mL, Intravenous, PRN, Tutu Gay,     sodium chloride 0.9 % flush 10 mL, 10 mL, Intravenous, Q12H, Vidal Weldon DO, 10 mL at 06/11/25 2243    sodium chloride 0.9 % flush 10 mL, 10 mL, Intravenous, PRN, Vidal Weldon,     sodium chloride 0.9 % infusion 40 mL,  "40 mL, Intravenous, PRN, Tutu Gay, DO    sodium chloride 0.9 % infusion 40 mL, 40 mL, Intravenous, PRN, Vidal Weldon DO    temazepam (RESTORIL) capsule 15 mg, 15 mg, Oral, Nightly PRN, Tutu Gay DO    valsartan (DIOVAN) tablet 320 mg, 320 mg, Oral, Daily, Olga Lidia Baeza II, DO, 320 mg at 06/11/25 0921    Laboratory Results:   Lab Results   Component Value Date    GLUCOSE 124 (H) 06/11/2025    CALCIUM 8.0 (L) 06/11/2025     06/11/2025    K 4.1 06/11/2025    CO2 22.0 06/11/2025     06/11/2025    BUN 5.7 (L) 06/11/2025    CREATININE 0.65 06/11/2025    BCR 8.8 06/11/2025    ANIONGAP 11.0 06/11/2025     Lab Results   Component Value Date    WBC 8.75 06/11/2025    HGB 11.9 (L) 06/11/2025    HCT 36.2 06/11/2025    MCV 91.0 06/11/2025     06/11/2025     Lab Results   Component Value Date    CHOL 276 (H) 06/11/2025     Lab Results   Component Value Date    HDL 32 (L) 06/11/2025     Lab Results   Component Value Date     (H) 06/11/2025     Lab Results   Component Value Date    TRIG 279 (H) 06/11/2025     Lab Results   Component Value Date    HGBA1C 6.30 (H) 06/11/2025     No results found for: \"FOLATE\"  No results found for: \"[id number]\"    Blood culture NGTD     TSH 10.81  Free T4      CRP 1.5  Lactate 1.8 down from 3.2  Pro-Kvng 0.08  Sed rate 26     UA noninfectious      Meningitis encephalitis panel negative  MS profile oligoclonal bands pending    Images/Procedures   CT head 6/9/2025  No acute findings     MRI brain without contrast 6/9/2025  White matter changes likely demyelination disease with differential considerations including sequelae of prior inflammation or infection, vasculitis, chronic small vessel ischemic change    MRI brain with and without contrast 6/11/2025  Small focus of relative diffusion restriction in the left frontotemporal white matter with subtle enhancement.  Demyelination versus subacute infarct.  Numerous scattered FLAIR hyperintensities " possibly from demyelinating disease or chronic small vessel disease    MRI cervical spine with and without contrast 6/11/2025  No evidence of demyelinating disease    CTA brain and neck 6/11/2025  No LVO or significant stenosis    Assessment / Plan   Active Hospital Problems:  Concern for demyelination versus vasculitis versus chronic small vessel disease   Suspect subacute left frontotemporal infarct    Brief Patient Summary:  Barbara Phillips is a 49 y.o. female with history of  rheumatoid arthritis, hypertension, hypothyroidism who presented to [facility] ED on 6/9 with vomiting, shaking and leg pain.  Neurology was consulted after MRI brain was obtained to rule out PRES that showed possible demyelination.  Patient denies prior or current neurologic symptoms.  Her exam is unremarkable.    Initial MRI showed findings concerning for demyelination.  Repeated MRI with contrast which showed an area of diffusion restriction concerning for subacute infarct.  Echocardiogram pending.  She has been started on aspirin and atorvastatin.    Lumbar puncture performed to evaluate for demyelinating disorder, slightly elevated proteins, no concern for infection.  Will follow-up on MS profile and oligoclonal bands to determine if she has MS or not.     Plan:   - Echocardiogram pending   --Once echo completed and is unremarkable patient is okay to discharge from a neurology standpoint  - Discharge on aspirin 81 mg and atorvastatin 80 mg  - Follow-up on MS profile and oligoclonal bands  - Follow-up outpatient neurology, MINDI Fajardo, in 1-2 months     I have discussed the above with the patient, family, bedside RN, hospitalist  Time spent with patient: 45 minutes in face-to-face evaluation and management of the patient.      Vidal Weldon DO, MS

## 2025-06-12 NOTE — PLAN OF CARE
Goal Outcome Evaluation:                   Anticipated Discharge Disposition (SLP): No further SLP services warranted    SLP Diagnosis: functional speech/language skills, functional cognitive-linguistic skills (06/12/25 1500)

## 2025-06-12 NOTE — THERAPY DISCHARGE NOTE
Acute Care - Occupational Therapy Discharge  [facility]    Patient Name: Barbara Phillips  : 1975    MRN: [id number]                              Today's Date: 2025       Admit Date: 2025    Visit Dx:     ICD-10-CM ICD-9-CM   1. Hypertensive urgency  I16.0 401.9   2. Hallucination  R44.3 780.1   3. Nausea and vomiting, unspecified vomiting type  R11.2 787.01   4. Tremors of nervous system  R25.1 781.0     Patient Active Problem List   Diagnosis    Vitamin D deficiency    HTN (hypertension)    Intractable vomiting with nausea    Rheumatoid arthritis    Hypertensive crisis    Hypertensive emergency     Past Medical History:   Diagnosis Date    Hypertension     Hypothyroidism     Rheumatoid arthritis      Past Surgical History:   Procedure Laterality Date    APPENDECTOMY       SECTION      CHOLECYSTECTOMY      TONSILLECTOMY        General Information       Row Name 25 0937          OT Time and Intention    Document Type evaluation;discharge evaluation/summary  -MR     Mode of Treatment occupational therapy  -MR     Patient Effort excellent  -MR       Row Name 25 0937          General Information    Patient Profile Reviewed yes  -MR     Prior Level of Function independent:;all household mobility;community mobility;gait;transfer;bed mobility;ADL's;work;driving  PTA pt was I w/ ADLs. Denies any recent falls. Still driving and working as a teacher.  -MR     Existing Precautions/Restrictions no known precautions/restrictions  -MR     Barriers to Rehab none identified  -MR       Row Name 2537          Living Environment    Current Living Arrangements home  -MR     People in Home alone;child(bahman), dependent  -MR       Row Name 25 0937          Home Main Entrance    Number of Stairs, Main Entrance none  -MR       Row Name 2537          Stairs Within Home, Primary    Number of Stairs, Within Home, Primary none  -MR       Row Name 25 0937          Cognition     Orientation Status (Cognition) oriented x 4  -MR       Row Name 06/12/25 0937          Safety Issues/Impairments Affecting Functional Mobility    Impairments Affecting Function (Mobility) pain  -MR               User Key  (r) = Recorded By, (t) = Taken By, (c) = Cosigned By      Initials Name Provider Type     Candy Alva, OT Occupational Therapist                   Mobility/ADL's       Row Name 06/12/25 0954          Bed Mobility    Bed Mobility supine-sit  -MR     Supine-Sit [place] (Bed Mobility) standby assist  -MR     Assistive Device (Bed Mobility) head of bed elevated;bed rails  -MR       Row Name 06/12/25 0954          Transfers    Transfers sit-stand transfer  -MR       Row Name 06/12/25 0954          Sit-Stand Transfer    Sit-Stand [place] (Transfers) standby assist  -MR       Row Name 06/12/25 0954          Functional Mobility    Functional Mobility- Ind. Level contact guard assist  -MR     Functional Mobility-Distance (Feet) --  HH distances  -MR       Row Name 06/12/25 0954          Activities of Daily Living    BADL Assessment/Intervention upper body dressing;grooming  -MR       Row Name 06/12/25 0954          Upper Body Dressing Assessment/Training    [place] Level (Upper Body Dressing) other (see comments);contact guard assist  adjusting hospital gown  -MR     Position (Upper Body Dressing) edge of bed sitting  -MR       Row Name 06/12/25 0954          Grooming Assessment/Training    [place] Level (Grooming) wash face, hands;hair care, combing/brushing;set up  -MR     Position (Grooming) edge of bed sitting  -MR               User Key  (r) = Recorded By, (t) = Taken By, (c) = Cosigned By      Initials Name Provider Type    Candy Mcarthur, KELVIN Occupational Therapist                   Obj/Interventions       Row Name 06/12/25 0955          Sensory Assessment (Somatosensory)    Sensory Assessment (Somatosensory) UE sensation intact  -MR       Row Name 06/12/25 0960           Vision Assessment/Intervention    Visual Impairment/Limitations WFL;corrective lenses full-time  -MR       Row Name 06/12/25 0955          Range of Motion Comprehensive    General Range of Motion bilateral upper extremity ROM WFL  -MR       Row Name 06/12/25 0955          Strength Comprehensive (MMT)    Comment, General Manual Muscle Testing (MMT) Assessment BUE grossly 4/5 in all functional planes  -MR       Row Name 06/12/25 0955          Balance    Balance Assessment sitting static balance;sitting dynamic balance;standing static balance;standing dynamic balance  -MR     Static Sitting Balance supervision  -MR     Dynamic Sitting Balance supervision  -MR     Position, Sitting Balance unsupported;sitting edge of bed  -MR     Static Standing Balance standby assist  -MR     Dynamic Standing Balance contact guard  -MR     Position/Device Used, Standing Balance supported;other (see comments)  L HHA for support with mobility  -MR     Balance Interventions sitting;standing;sit to stand;supported;dynamic;static;minimal challenge  -MR               User Key  (r) = Recorded By, (t) = Taken By, (c) = Cosigned By      Initials Name Provider Type    MR Candy Alva, OT Occupational Therapist                   Goals/Plan    No documentation.                  Clinical Impression       Row Name 06/12/25 0957          Pain Assessment    Pain Location back  -MR     Pain Side/Orientation generalized  -MR     Additional Documentation Pain Scale: FACES Pre/Post-Treatment (Group)  -MR       Row Name 06/12/25 0957          Pain Scale: FACES Pre/Post-Treatment    Pain: FACES Scale, Pretreatment 4-->hurts little more  -MR     Posttreatment Pain Rating 4-->hurts little more  -MR       Row Name 06/12/25 0957          Plan of Care Review    Plan of Care Reviewed With patient  -MR     Progress no change  Initial Eval/Discharge  -MR     Outcome Evaluation Patient presenting at or near her functional baseline. Limitations this date include  chronic L knee and back pain. Pt completed bed mobility, UB dressing and transfers w/ SBA. No skilled OT warranted at this time, OT signing off. Recommend home w/ assist when medically appropriate for d/c.  -MR [place] Name 06/12/25 0957          Therapy Assessment/Plan (OT)    Patient/Family Therapy Goal Statement (OT) Return to PLOF  -MR     Rehab Potential (OT) good  -MR     Criteria for Skilled Therapeutic Interventions Met (OT) yes;meets criteria;skilled treatment is necessary  -MR     Therapy Frequency (OT) daily  -MR     Predicted Duration of Therapy Intervention (OT) 5 days  -MR       [place] Name 06/12/25 0957          Therapy Plan Review/Discharge Plan (OT)    Anticipated Discharge Disposition (OT) home with assist  -MR [place] Name 06/12/25 0957          Vital Signs    Pre Systolic BP Rehab 168  -MR     Pre Treatment Diastolic   -MR     Post Systolic BP Rehab 160  -MR     Post Treatment Diastolic   -MR     Pretreatment Heart Rate (beats/min) 101  -MR     Posttreatment Heart Rate (beats/min) 114  -MR     Pre SpO2 (%) 91  -MR     O2 Delivery Pre Treatment room air  -MR     O2 Delivery Intra Treatment room air  -MR     Post SpO2 (%) 94  -MR     O2 Delivery Post Treatment room air  -MR     Pre Patient Position Supine  -MR     Intra Patient Position Standing  -MR     Post Patient Position Standing  -MR       Row Name 06/12/25 0957          Positioning and Restraints    Pre-Treatment Position in bed  -MR     Post Treatment Position other  -MR     Other Position with PT  -MR               User Key  (r) = Recorded By, (t) = Taken By, (c) = Cosigned By      Initials Name Provider Type    MR Candy Alva, OT Occupational Therapist                   Outcome Measures       Row Name 06/12/25 0959          How much help from another is currently needed...    Putting on and taking off regular lower body clothing? 3  -MR     Bathing (including washing, rinsing, and drying) 3  -MR     Toileting (which  includes using toilet bed pan or urinal) 4  -MR     Putting on and taking off regular upper body clothing 4  -MR     Taking care of personal grooming (such as brushing teeth) 4  -MR     Eating meals 4  -MR     AM-PAC 6 Clicks Score (OT) 22  -MR [place] Name 06/12/25 0910          How much help from another person do you currently need...    Turning from your back to your side while in flat bed without using bedrails? 4 (P)   -ST     Moving from lying on back to sitting on the side of a flat bed without bedrails? 4 (P)   -ST     Moving to and from a bed to a chair (including a wheelchair)? 4 (P)   -ST     Standing up from a chair using your arms (e.g., wheelchair, bedside chair)? 3 (P)   -ST     Climbing 3-5 steps with a railing? 3 (P)   -ST     To walk in hospital room? 3 (P)   -ST     AM-PAC 6 Clicks Score (PT) 21 (P)   -ST       Row Name 06/12/25 0959          Modified [place] Scale    Pre-Stroke Modified [place] Scale 6 - Unable to determine (UTD) from the medical record documentation  -MR     Modified [place] Scale 0 - No Symptoms at all.  -MR       Row Name 06/12/25 0959          Functional Assessment    Outcome Measure Options AM-PAC 6 Clicks Daily Activity (OT);Modified [place]  -MR               User Key  (r) = Recorded By, (t) = Taken By, (c) = Cosigned By      Initials Name Provider Type    Candy Mcarthur, OT Occupational Therapist    Sue Gaston, PT Student PT Student                  Occupational Therapy Education       Title: PT OT SLP Therapies (In Progress)       Topic: Occupational Therapy (In Progress)       Point: ADL training (Done)       Learning Progress Summary            Patient Acceptance, E, VU by MR at 6/12/2025 1000                      Point: Precautions (Done)       Learning Progress Summary            Patient Acceptance, E, VU by MR at 6/12/2025 1000                      Point: Body mechanics (Done)       Learning Progress Summary            Patient Acceptance, E, VU by MR at  6/12/2025 1000                                      User Key       Initials Effective Dates Name Provider Type Discipline    MR 09/22/22 -  Candy Alva OT Occupational Therapist OT                  OT Recommendation and Plan  Therapy Frequency (OT): daily  Plan of Care Review  Plan of Care Reviewed With: patient  Progress: no change (Initial Eval/Discharge)  Outcome Evaluation: Patient presenting at or near her functional baseline. Limitations this date include chronic L knee and back pain. Pt completed bed mobility, UB dressing and transfers w/ SBA. No skilled OT warranted at this time, OT signing off. Recommend home w/ assist when medically appropriate for d/c.  Plan of Care Reviewed With: patient  Outcome Evaluation: Patient presenting at or near her functional baseline. Limitations this date include chronic L knee and back pain. Pt completed bed mobility, UB dressing and transfers w/ SBA. No skilled OT warranted at this time, OT signing off. Recommend home w/ assist when medically appropriate for d/c.     Time Calculation:   Evaluation Complexity (OT)  Review Occupational Profile/Medical/Therapy History Complexity: brief/low complexity  Assessment, Occupational Performance/Identification of Deficit Complexity: 1-3 performance deficits  Clinical Decision Making Complexity (OT): problem focused assessment/low complexity  Overall Complexity of Evaluation (OT): low complexity     Time Calculation- OT       Row Name 06/12/25 1001             Time Calculation- OT    OT Start Time 0808  -MR      OT Received On 06/12/25  -MR         Untimed Charges    OT Eval/Re-eval Minutes 46  -MR         Total Minutes    Untimed Charges Total Minutes 46  -MR       Total Minutes 46  -MR                User Key  (r) = Recorded By, (t) = Taken By, (c) = Cosigned By      Initials Name Provider Type     Candy Alva, KELVIN Occupational Therapist                  Therapy Charges for Today       Code Description Service Date Service  Provider Modifiers Qty    [id number] HC OT EVAL LOW COMPLEXITY 4 6/12/2025 Candy Alva OT GO 1               OT Discharge Summary  Anticipated Discharge Disposition (OT): home with assist    Candy Alva OT  6/12/2025

## 2025-06-12 NOTE — PLAN OF CARE
Goal Outcome Evaluation:  Plan of Care Reviewed With: patient        Progress: no change (Initial Eval/Discharge)  Outcome Evaluation: Patient presenting at or near her functional baseline. Limitations this date include chronic L knee and back pain. Pt completed bed mobility, UB dressing and transfers w/ SBA. No skilled OT warranted at this time, OT signing off. Recommend home w/ assist when medically appropriate for d/c.    Anticipated Discharge Disposition (OT): home with assist

## 2025-06-12 NOTE — THERAPY EVALUATION
Patient Name: Barbara Phillips  : 1975    MRN: [id number]                              Today's Date: 2025       Admit Date: 2025    Visit Dx:     ICD-10-CM ICD-9-CM   1. Hypertensive urgency  I16.0 401.9   2. Hallucination  R44.3 780.1   3. Nausea and vomiting, unspecified vomiting type  R11.2 787.01   4. Tremors of nervous system  R25.1 781.0     Patient Active Problem List   Diagnosis    Vitamin D deficiency    HTN (hypertension)    Intractable vomiting with nausea    Rheumatoid arthritis    Hypertensive crisis    Hypertensive emergency     Past Medical History:   Diagnosis Date    Hypertension     Hypothyroidism     Rheumatoid arthritis      Past Surgical History:   Procedure Laterality Date    APPENDECTOMY       SECTION      CHOLECYSTECTOMY      TONSILLECTOMY        General Information       [facility] Name 25          Physical Therapy Time and Intention    Document Type discharge evaluation/summary (P)   -ST     Mode of Treatment co-treatment;physical therapy (P)   -ST       Row Name 25 08          General Information    Patient Profile Reviewed yes (P)   -ST     Prior Level of Function independent:;all household mobility;community mobility;gait;transfer (P)   pain reported with walking due to arthritis  -ST     Existing Precautions/Restrictions no known precautions/restrictions (P)   -ST     Barriers to Rehab none identified (P)   -ST       Row Name 25 08          Living Environment    Current Living Arrangements home (P)   -ST     People in Home alone (P)   -ST       Row Name 25 08          Home Main Entrance    Number of Stairs, Main Entrance none (P)   -ST       Row Name 25 08          Stairs Within Home, Primary    Number of Stairs, Within Home, Primary none (P)   -ST       Row Name 25 08          Cognition    Orientation Status (Cognition) oriented x 4 (P)   -ST       Row Name 25          Safety Issues/Impairments Affecting  Functional Mobility    Safety Issues Affecting Function (Mobility) safety precaution awareness (P)   -ST     Impairments Affecting Function (Mobility) -- (P)   -ST               User Key  (r) = Recorded By, (t) = Taken By, (c) = Cosigned By      Initials Name Provider Type    Sue Gaston, PT Student PT Student                   Mobility       Row Name 06/12/25 0844          Bed Mobility    Bed Mobility supine-sit (P)   -ST     Supine-Sit [place] (Bed Mobility) standby assist (P)   -ST     Assistive Device (Bed Mobility) head of bed elevated (P)   -ST       Row Name 06/12/25 0844          Transfers    Comment, (Transfers) comleted well w/ no cues (P)   -ST       Row Name 06/12/25 0844          Sit-Stand Transfer    Sit-Stand [place] (Transfers) contact guard (P)   -ST     Comment, (Sit-Stand Transfer) 1x STS from EOB min difficulty noted due to discomfort in knee (P)   -ST       Row Name 06/12/25 0844          Gait/Stairs (Locomotion)    [place] Level (Gait) contact guard;1 person assist (P)   -ST     Assistive Device (Gait) other (see comments) (P)   1x HHA, however was not dependent on assist for ambulation  -ST     Patient was able to Ambulate yes (P)   -ST     Distance in Feet (Gait) 150 (P)   150+ 150  -ST     Deviations/Abnormal Patterns (Gait) bilateral deviations;weight shifting decreased;antalgic;other (see comments) (P)   knee pain  -ST     Bilateral Gait Deviations weight shift ability decreased (P)   -ST     Right Sided Gait Deviations weight shift ability decreased (P)   -ST     Comment, (Gait/Stairs) PT walked total of 300 ft with CGA and 1 HHA only pushing through hand when walking heel to toe when completeing FGA.  Pt completed other components of FGA well with minimal complaints of pain in knee. (P)   -ST               User Key  (r) = Recorded By, (t) = Taken By, (c) = Cosigned By      Initials Name Provider Type    Howard Gastonlucio MITCHELL, PT Student PT Student                    Obj/Interventions       Row Name 06/12/25 0853          Range of Motion Comprehensive    General Range of Motion no range of motion deficits identified (P)   -ST       Row Name 06/12/25 0853          Strength Comprehensive (MMT)    General Manual Muscle Testing (MMT) Assessment no strength deficits identified (P)   -ST     Comment, General Manual Muscle Testing (MMT) Assessment BLE MMT grossly 4+/5 with increased pain in R knee w/ overpressure (P)   -ST       Row Name 06/12/25 0853          Motor Skills    Motor Skills motor control/coordination interventions (P)   -ST     Motor Control/Coordination Interventions stepping/walking;other (see comments) (P)   FGA  -ST     Gross Motor Skill, Impairments Detail Scored 27/30 =90.0% . Pt scored below normal for the following : stepping over obstical w/ mild impairment, gait w/ narrow base of support w/ mild impairment, and steps w/ mild impairment. All impairments due to R knee pain. (P)   -ST       Row Name 06/12/25 0853          Balance    Balance Assessment sitting static balance;sitting dynamic balance;standing dynamic balance;standing static balance (P)   -ST     Static Sitting Balance standby assist (P)   -ST     Dynamic Sitting Balance standby assist (P)   -ST     Position, Sitting Balance unsupported;sitting edge of bed (P)   -ST     Static Standing Balance contact guard (P)   -ST     Dynamic Standing Balance contact guard (P)   -ST     Balance Interventions sitting;standing;sit to stand;static;dynamic (P)   -ST     Comment, Balance Pt reports occational R knee buckling and feeling unsteady due to knee pain, however no buckling observed today and good completion of FGA. (P)   -ST       Row Name 06/12/25 0853          Sensory Assessment (Somatosensory)    Sensory Assessment (Somatosensory) LE sensation intact (P)   -ST               User Key  (r) = Recorded By, (t) = Taken By, (c) = Cosigned By      Initials Name Provider Type    Sue Gaston, PT Student PT  Student                   Goals/Plan    No documentation.                  Clinical Impression       Row Name 06/12/25 0906          Pain    Pretreatment Pain Rating 5/10 (P)   -ST     Posttreatment Pain Rating 5/10 (P)   -ST     Pain Location back (P)   -ST     Pain Side/Orientation generalized (P)   -ST     Pain Management Interventions activity modification encouraged;exercise or physical activity utilized (P)   -ST     Response to Pain Interventions no change per patient report (P)   -ST     Pre/Posttreatment Pain Comment increased pain in knee during session (P)   -ST       Row Name 06/12/25 0906          Plan of Care Review    Plan of Care Reviewed With patient (P)   -ST     Outcome Evaluation Pt presents with minimal deficits during mobility consistant with her baseline function due to R knee arthritis. Pt not appropriate for skilled PT services at this time. PT rec home upon D/C. (P)   -ST       Row Name 06/12/25 0906          Therapy Assessment/Plan (PT)    Criteria for Skilled Interventions Met (PT) no;no problems identified which require skilled intervention (P)   -ST       Row Name 06/12/25 0906          Vital Signs    Pre Systolic BP Rehab 160 (P)   -ST     Pre Treatment Diastolic  (P)   -ST     Post Systolic BP Rehab 139 (P)   -ST     Post Treatment Diastolic BP 89 (P)   -ST     Pretreatment Heart Rate (beats/min) 101 (P)   -ST     Posttreatment Heart Rate (beats/min) 113 (P)   -ST     Pre SpO2 (%) 87 (P)   -ST     O2 Delivery Pre Treatment room air (P)   -ST     O2 Delivery Intra Treatment room air (P)   -ST     Post SpO2 (%) 94 (P)   -ST     Pre Patient Position Supine (P)   -ST     Post Patient Position Sitting (P)   -ST       Row Name 06/12/25 0906          Positioning and Restraints    Pre-Treatment Position in bed (P)   -ST     Post Treatment Position chair (P)   -ST     In Chair reclined;call light within reach;encouraged to call for assist;exit alarm on;waffle cushion (P)   -ST                User Key  (r) = Recorded By, (t) = Taken By, (c) = Cosigned By      Initials Name Provider Type    Sue Gaston, PT Student PT Student                   Outcome Measures       Row Name 06/12/25 0910          How much help from another person do you currently need...    Turning from your back to your side while in flat bed without using bedrails? 4 (P)   -ST     Moving from lying on back to sitting on the side of a flat bed without bedrails? 4 (P)   -ST     Moving to and from a bed to a chair (including a wheelchair)? 4 (P)   -ST     Standing up from a chair using your arms (e.g., wheelchair, bedside chair)? 3 (P)   -ST     Climbing 3-5 steps with a railing? 3 (P)   -ST     To walk in hospital room? 3 (P)   -ST     AM-PAC 6 Clicks Score (PT) 21 (P)   -ST     Highest Level of Mobility Goal Walk 10 Steps or More-6 (P)   -ST       Row Name 06/12/25 0959 06/12/25 0910       Modified [place] Scale    Pre-Stroke Modified [place] Scale 6 - Unable to determine (UTD) from the medical record documentation  -MR 6 - Unable to determine (UTD) from the medical record documentation (P)   -ST    Modified [place] Scale 0 - No Symptoms at all.  -MR 0 - No Symptoms at all. (P)   -ST      Row Name 06/12/25 0959 06/12/25 0910       Functional Assessment    Outcome Measure Options AM-PAC 6 Clicks Daily Activity (OT);Modified [place]  -MR AM-PAC 6 Clicks Basic Mobility (PT);Modified [place] (P)   -ST      Row Name 06/12/25 0910          Functional Gait Assessment (FGA)    Gait Level Surface 3 (P)   -ST     Change in Gait Speed 3 (P)   -ST     Gait with Horizontal Head Turns 3 (P)   -ST     Gait with Vertical Head Turns 3 (P)   -ST     Gait and Pivot Turn 3 (P)   -ST     Step Over Obstacle 2 (P)   -ST     Gait with Narrow Base of Support 2 (P)   -ST     Gait with Eyes Closed 3 (P)   -ST     Ambulating Backwards 3 (P)   -ST     Steps 2 (P)   -ST     FGA Total Score 27 (P)   -ST               User Key  (r) = Recorded By, (t) = Taken  By, (c) = Cosigned By      Initials Name Provider Type    Candy Mcarthur, OT Occupational Therapist    ST Sue Rausch, PT Student PT Student                                 Physical Therapy Education       Title: PT OT SLP Therapies (In Progress)       Topic: Physical Therapy (Done)       Point: Mobility training (Done)       Learning Progress Summary            Patient Acceptance, E, VU by  at 6/12/2025 0823                      Point: Home exercise program (Done)       Learning Progress Summary            Patient Acceptance, E, VU by  at 6/12/2025 0823                      Point: Body mechanics (Done)       Learning Progress Summary            Patient Acceptance, E, VU by  at 6/12/2025 0823                      Point: Precautions (Done)       Learning Progress Summary            Patient Acceptance, E, VU by  at 6/12/2025 0823                                      User Key       Initials Effective Dates Name Provider Type Discipline     10/04/24 -  Sue Rausch, PT Student PT Student PT                  PT Recommendation and Plan     Outcome Evaluation: (P) Pt presents with minimal deficits during mobility consistant with her baseline function due to R knee arthritis. Pt not appropriate for skilled PT services at this time. PT rec home upon D/C.     Time Calculation:   PT Evaluation Complexity  History, PT Evaluation Complexity: (P) 3 or more personal factors and/or comorbidities  Examination of Body Systems (PT Eval Complexity): (P) total of 4 or more elements  Clinical Presentation (PT Evaluation Complexity): (P) stable  Clinical Decision Making (PT Evaluation Complexity): (P) low complexity  Overall Complexity (PT Evaluation Complexity): (P) low complexity     PT Charges       Row Name 06/12/25 0912             Time Calculation    Start Time 0823 (P)   -      PT Received On 06/12/25 (P)   -      PT Goal Re-Cert Due Date 06/22/25 (P)   -         Untimed Charges    PT Eval/Re-eval  Minutes 50 (P)   -ST         SNF Physical Therapy Minutes    Skilled Minutes- PT -- (P)   -ST         Total Minutes    Untimed Charges Total Minutes 50 (P)   -ST       Total Minutes 50 (P)   -ST                User Key  (r) = Recorded By, (t) = Taken By, (c) = Cosigned By      Initials Name Provider Type     Sue Rausch, PT Student PT Student                  Therapy Charges for Today       Code Description Service Date Service Provider Modifiers Qty    29570100079 HC PT EVAL LOW COMPLEXITY 4 6/12/2025 Sue Rausch, PT Student GP 1            PT G-Codes  Outcome Measure Options: AM-PAC 6 Clicks Daily Activity (OT), Modified [place]  AM-PAC 6 Clicks Score (PT): (P) 21  AM-PAC 6 Clicks Score (OT): 22  FGA Total Score: (P) 27  Modified [place] Scale: 0 - No Symptoms at all.  PT Discharge Summary  Anticipated Discharge Disposition (PT): (P) home  Reason for Discharge: (P) At baseline function  Discharge Destination: (P) Home    Sue Rausch PT Student  6/12/2025

## 2025-06-12 NOTE — PLAN OF CARE
Goal Outcome Evaluation:  Plan of Care Reviewed With: (P) patient           Outcome Evaluation: (P) Pt presents with minimal deficits during mobility consistant with her baseline function due to R knee arthritis. Pt not appropriate for skilled PT services at this time. PT rec home upon D/C.    Anticipated Discharge Disposition (PT): (P) home

## 2025-06-13 ENCOUNTER — READMISSION MANAGEMENT (OUTPATIENT)
Dept: CALL CENTER | Facility: HOSPITAL | Age: 50
End: 2025-06-13
Payer: COMMERCIAL

## 2025-06-13 ENCOUNTER — APPOINTMENT (OUTPATIENT)
Dept: CT IMAGING | Facility: HOSPITAL | Age: 50
DRG: 065 | End: 2025-06-13
Payer: COMMERCIAL

## 2025-06-13 VITALS
HEART RATE: 107 BPM | HEIGHT: 66 IN | DIASTOLIC BLOOD PRESSURE: 81 MMHG | BODY MASS INDEX: 45.35 KG/M2 | SYSTOLIC BLOOD PRESSURE: 141 MMHG | WEIGHT: 282.19 LBS | TEMPERATURE: 98.3 F | RESPIRATION RATE: 17 BRPM | OXYGEN SATURATION: 96 %

## 2025-06-13 LAB
AORTIC DIMENSIONLESS INDEX: 0.88 (DI)
AV MEAN PRESS GRAD SYS DOP V1V2: 4 MMHG
AV VMAX SYS DOP: 143 CM/SEC
BH CV ECHO MEAS - AO MAX PG: 8.2 MMHG
BH CV ECHO MEAS - AO ROOT DIAM: 3.8 CM
BH CV ECHO MEAS - AO V2 VTI: 22 CM
BH CV ECHO MEAS - AVA(I,D): 2.8 CM2
BH CV ECHO MEAS - EDV(CUBED): 132.7 ML
BH CV ECHO MEAS - EDV(MOD-SP2): 114 ML
BH CV ECHO MEAS - EDV(MOD-SP4): 119 ML
BH CV ECHO MEAS - EF(MOD-SP2): 63.3 %
BH CV ECHO MEAS - EF(MOD-SP4): 56.7 %
BH CV ECHO MEAS - ESV(CUBED): 27 ML
BH CV ECHO MEAS - ESV(MOD-SP2): 41.8 ML
BH CV ECHO MEAS - ESV(MOD-SP4): 51.5 ML
BH CV ECHO MEAS - FS: 41.2 %
BH CV ECHO MEAS - IVS/LVPW: 1 CM
BH CV ECHO MEAS - IVSD: 1 CM
BH CV ECHO MEAS - LA DIMENSION: 4.4 CM
BH CV ECHO MEAS - LAT PEAK E' VEL: 6.4 CM/SEC
BH CV ECHO MEAS - LV DIASTOLIC VOL/BSA (35-75): 51.4 CM2
BH CV ECHO MEAS - LV MASS(C)D: 188 GRAMS
BH CV ECHO MEAS - LV MAX PG: 6.7 MMHG
BH CV ECHO MEAS - LV MEAN PG: 3.5 MMHG
BH CV ECHO MEAS - LV SYSTOLIC VOL/BSA (12-30): 22.2 CM2
BH CV ECHO MEAS - LV V1 MAX: 129.5 CM/SEC
BH CV ECHO MEAS - LV V1 VTI: 19.4 CM
BH CV ECHO MEAS - LVIDD: 5.1 CM
BH CV ECHO MEAS - LVIDS: 3 CM
BH CV ECHO MEAS - LVOT AREA: 3.1 CM2
BH CV ECHO MEAS - LVOT DIAM: 2 CM
BH CV ECHO MEAS - LVPWD: 1 CM
BH CV ECHO MEAS - MED PEAK E' VEL: 4.9 CM/SEC
BH CV ECHO MEAS - MV A MAX VEL: 128 CM/SEC
BH CV ECHO MEAS - MV DEC SLOPE: 633 CM/SEC2
BH CV ECHO MEAS - MV DEC TIME: 0.17 SEC
BH CV ECHO MEAS - MV E MAX VEL: 70.6 CM/SEC
BH CV ECHO MEAS - MV E/A: 0.55
BH CV ECHO MEAS - MV MAX PG: 6.3 MMHG
BH CV ECHO MEAS - MV MEAN PG: 3 MMHG
BH CV ECHO MEAS - MV P1/2T: 41.6 MSEC
BH CV ECHO MEAS - MV V2 VTI: 26 CM
BH CV ECHO MEAS - MVA(P1/2T): 5.3 CM2
BH CV ECHO MEAS - MVA(VTI): 2.34 CM2
BH CV ECHO MEAS - PA ACC TIME: 0.08 SEC
BH CV ECHO MEAS - SV(LVOT): 60.8 ML
BH CV ECHO MEAS - SV(MOD-SP2): 72.2 ML
BH CV ECHO MEAS - SV(MOD-SP4): 67.5 ML
BH CV ECHO MEAS - SVI(LVOT): 26.3 ML/M2
BH CV ECHO MEAS - SVI(MOD-SP2): 31.2 ML/M2
BH CV ECHO MEAS - SVI(MOD-SP4): 29.2 ML/M2
BH CV ECHO MEAS - TAPSE (>1.6): 2.6 CM
BH CV ECHO MEASUREMENTS AVERAGE E/E' RATIO: 12.5
BH CV ECHO SHUNT ASSESSMENT PERFORMED (HIDDEN SCRIPTING): 1
BH CV VAS BP RIGHT ARM: NORMAL MMHG
BH CV XLRA - RV BASE: 3.2 CM
BH CV XLRA - RV LENGTH: 7.2 CM
BH CV XLRA - RV MID: 2.4 CM
BH CV XLRA - TDI S': 17.3 CM/SEC
IVRT: 113 MS
LEFT ATRIUM VOLUME INDEX: 20.7 ML/M2
LV EF BIPLANE MOD: 60 %

## 2025-06-13 PROCEDURE — 93010 ELECTROCARDIOGRAM REPORT: CPT | Performed by: INTERNAL MEDICINE

## 2025-06-13 PROCEDURE — 99239 HOSP IP/OBS DSCHRG MGMT >30: CPT | Performed by: STUDENT IN AN ORGANIZED HEALTH CARE EDUCATION/TRAINING PROGRAM

## 2025-06-13 PROCEDURE — 93005 ELECTROCARDIOGRAM TRACING: CPT | Performed by: INTERNAL MEDICINE

## 2025-06-13 PROCEDURE — 71275 CT ANGIOGRAPHY CHEST: CPT

## 2025-06-13 PROCEDURE — 25510000001 IOPAMIDOL PER 1 ML: Performed by: STUDENT IN AN ORGANIZED HEALTH CARE EDUCATION/TRAINING PROGRAM

## 2025-06-13 PROCEDURE — 25810000003 SODIUM CHLORIDE 0.9 % SOLUTION: Performed by: NURSE PRACTITIONER

## 2025-06-13 RX ORDER — HYDRALAZINE HYDROCHLORIDE 25 MG/1
25 TABLET, FILM COATED ORAL EVERY 8 HOURS SCHEDULED
Qty: 90 TABLET | Refills: 0 | Status: SHIPPED | OUTPATIENT
Start: 2025-06-13

## 2025-06-13 RX ORDER — HYDRALAZINE HYDROCHLORIDE 25 MG/1
25 TABLET, FILM COATED ORAL EVERY 8 HOURS SCHEDULED
Status: DISCONTINUED | OUTPATIENT
Start: 2025-06-13 | End: 2025-06-13 | Stop reason: HOSPADM

## 2025-06-13 RX ORDER — IOPAMIDOL 755 MG/ML
100 INJECTION, SOLUTION INTRAVASCULAR
Status: COMPLETED | OUTPATIENT
Start: 2025-06-13 | End: 2025-06-13

## 2025-06-13 RX ADMIN — LEVOTHYROXINE SODIUM 75 MCG: 0.03 TABLET ORAL at 04:04

## 2025-06-13 RX ADMIN — VALSARTAN 320 MG: 160 TABLET, FILM COATED ORAL at 08:18

## 2025-06-13 RX ADMIN — ASPIRIN 325 MG: 325 TABLET ORAL at 08:18

## 2025-06-13 RX ADMIN — IOPAMIDOL 85 ML: 755 INJECTION, SOLUTION INTRAVENOUS at 10:40

## 2025-06-13 RX ADMIN — Medication 10 ML: at 08:18

## 2025-06-13 RX ADMIN — AMLODIPINE BESYLATE 10 MG: 10 TABLET ORAL at 08:18

## 2025-06-13 RX ADMIN — HYDROCODONE BITARTRATE AND ACETAMINOPHEN 1 TABLET: 5; 325 TABLET ORAL at 08:22

## 2025-06-13 RX ADMIN — SODIUM CHLORIDE 500 ML: 9 INJECTION, SOLUTION INTRAVENOUS at 02:19

## 2025-06-13 RX ADMIN — ACETAMINOPHEN 650 MG: 325 TABLET ORAL at 04:03

## 2025-06-13 RX ADMIN — HYDRALAZINE HYDROCHLORIDE 25 MG: 25 TABLET ORAL at 08:18

## 2025-06-13 NOTE — NURSING NOTE
Patient alert and oriented x4. Sinus tachycardia, rate up in 120's with activity - MD notified, see orders. Reports of 9/10 BLE pain, prn given - MD notified. Per CT tech patient 's IV was infiltrated and very hard around the site - MD notified and evaluated at bedside.     Discharge order placed by hospitalist. IV and tele removed. Meds to beds delivered. All discharge teaching reviewed with patient at bedside, further questions denied. Patient wheeled to [address] building by transport to be discharged home with family.

## 2025-06-13 NOTE — PAYOR COMM NOTE
"Haroldo Phillips (49 y.o. Female)       Date of Birth   1975    Social Security Number       Address   [address] [place] [id number]    Home Phone   [phone]    MRN   [id number]       [facility]    Marital Status   Single                            Admission Date   2025    Admission Type   Emergency    Admitting Provider   Elizabeth Art MD    Attending Provider   Elizabeth Art MD    Department, Room/Bed   [facility] 3F, S314/1       Discharge Date       Discharge Disposition   Home or Self Care    Discharge Destination                                 Attending Provider: Elizabeth Art MD    Allergies: Morphine And Codeine    Isolation: None   Infection: None   Code Status: CPR    Ht: 167.6 cm (65.98\")   Wt: 128 kg (282 lb 3 oz)    Admission Cmt: None   Principal Problem: Intractable vomiting with nausea [R11.2]                   Active Insurance as of 2025       Primary Coverage       Payor Plan Insurance Group Employer/Plan Group    Pending sale to [facility] [employer] [id number]       Payor Plan Address Payor Plan Phone Number Payor Plan Fax Number Effective Dates    [address] [phone]  2015 - None Entered    [place] [address]         Subscriber Name Subscriber Birth Date Member ID       HAROLDO PHILLIPS 1975 [id number]                     Emergency Contacts        (Rel.) Home Phone Work Phone Mobile Phone    Thelma Wood (Sister) [phone] -- --    THELMA PHILLIPS (Daughter) -- -- [phone]                 Discharge Summary        Yazan Summers MD at [address] [facility]              [facility]  DISCHARGE SUMMARY    Patient Name: Haroldo Phillips  : 1975  MRN: [id number]    Date of Admission: 2025  7:09 AM  Date of Discharge:  2025  Primary Care Physician: Matilda Almendarez MD    Consults       Date and Time Order Name Status Description    6/10/2025  " 8:46 AM Inpatient Neurology Consult General Completed             Hospital Course     Presenting Problem:     Active Hospital Problems    Diagnosis  POA    **Intractable vomiting with nausea [R11.2]  Yes    Hypertensive emergency [I16.1]  Yes    HTN (hypertension) [I10]  Yes    Rheumatoid arthritis [M06.9]  Yes    Hypertensive crisis [I16.9]  Yes      Resolved Hospital Problems   No resolved problems to display.          Hospital Course:  Barbara Phillips is a 49 y.o. female  w/ HTN, hypothyroid, rheumatoid/psoriatic arthritis, morbid obesity, holding her MTX for planned TKA and developed visual hallucinations ~4 days pta, then the night before arrival developed intractable N/V followed by a headache and was found to have BP of 237/139.     HTN Emergency  Abnl MRI likely c/w subacute CVA  Headache  Visual hallucinations  - d/w neurology - feels mri finding more c/w cva given risk factors.   - increased diovan.  BP still up, increased amlodipine to 10mg daily.  Home if SBP less than 170 and echo is normal  - Asa/statin  - s/p LP.  Neuro to f/u MS profile and oligoclonal bands     Mesenteric Panniculitis  -- noted on CT A/P, however, no other s/s of active infection. Continue to monitor off ABX for now      Hypothyroidism   - cont home levothyroxine     RA (reports rheumatoid/psoriatic arthritis)   - typically takes MTX on Fridays, last dose 5/30/25 in plan for surgery     Morbid Obesity  -- complicates all aspects of care      Discharge Follow Up Recommendations for outpatient labs/diagnostics:  F/u with PCP in 1 week  F/u with neuro, MINDI Luis in 1-2 mos, f/u MS profile and oligoclonal bands    Day of Discharge     HPI:   Saw patient.  Says symptoms have resolved.  Wants to go home.     Review of Systems  Gen- No fevers, chills  CV- No chest pain, palpitations  Resp- No cough, dyspnea  GI- No N/V/D, abd pain      Vital Signs:   Temp:  [98 °F (36.7 °C)-99.2 °F (37.3 °C)] 98.8 °F (37.1 °C)  Heart Rate:  []  99  Resp:  [16-19] 18  BP: (147-181)/() 174/91  Flow (L/min) (Oxygen Therapy):  [2-3] 3      Physical Exam:  Constitutional: No acute distress, awake, alert, sitting up in chair  HENT: NCAT, mucous membranes moist  Respiratory: Clear to auscultation bilaterally, respiratory effort normal   Cardiovascular: RRR, no murmurs, rubs, or gallops  Gastrointestinal: Positive bowel sounds, soft, nontender, nondistended  Musculoskeletal: No bilateral ankle edema  Psychiatric: Appropriate affect, cooperative  Neurologic: Oriented x 3, [place], speech clear  Skin: No rashes      Pertinent  and/or Most Recent Results     LAB RESULTS:      Lab 06/11/25  0445 06/10/25  0447 06/09/25  1044 06/09/25  0917 06/09/25  0747   WBC 8.75 9.47  --   --  10.40   HEMOGLOBIN 11.9* 12.3  --   --  14.2   HEMATOCRIT 36.2 37.8  --   --  41.1   PLATELETS 429 403  --   --  497*   NEUTROS ABS 4.09 5.00  --   --  7.21*   IMMATURE GRANS (ABS) 0.03 0.02  --   --  0.03   LYMPHS ABS 2.99 2.88  --   --  2.13   MONOS ABS 0.81 0.93*  --   --  0.71   EOS ABS 0.76* 0.57*  --   --  0.27   MCV 91.0 91.5  --   --  85.6   SED RATE 26*  --   --   --  53*   CRP  --   --   --  1.50*  --    PROCALCITONIN  --   --   --   --  0.08   LACTATE  --   --  1.8  --  3.2*         Lab 06/11/25  1533 06/11/25  0445 06/10/25  1157 06/09/25  0747   SODIUM  --  140 142 141   POTASSIUM 4.1 3.6 3.8 4.5   CHLORIDE  --  107 107 103   CO2  --  22.0 23.0 18.0*   ANION GAP  --  11.0 12.0 20.0*   BUN  --  5.7* 6.2 13.6   CREATININE  --  0.65 0.69 0.97   EGFR  --  108.1 106.5 71.8   GLUCOSE  --  124* 116* 153*   CALCIUM  --  8.0* 8.3* 10.0   IONIZED CALCIUM  --   --   --  1.11*   MAGNESIUM  --   --  1.7 1.4*   HEMOGLOBIN A1C  --  6.30*  --   --    TSH  --  10.810*  --  9.890*         Lab 06/09/25  0747   TOTAL PROTEIN 8.5   ALBUMIN 4.7   GLOBULIN 3.8   ALT (SGPT) 11   AST (SGOT) 23   BILIRUBIN 0.4   ALK PHOS 112   LIPASE 13         Lab 06/09/25  0917 06/09/25  0747   PROBNP  --  1,104.0*    HSTROP T 25* 29*         Lab 06/11/25  0445   CHOLESTEROL 276*   LDL CHOL 189*   HDL CHOL 32*   TRIGLYCERIDES 279*             Brief Urine Lab Results  (Last result in the past 365 days)        Color   Clarity   Blood   Leuk Est   Nitrite   Protein   CREAT   Urine HCG        06/09/25 0738               Negative       06/09/25 0738 Yellow   Clear   Negative   Negative   Negative   30 mg/dL (1+)                 Microbiology Results (last 10 days)       Procedure Component Value - Date/Time    Meningitis / Encephalitis Panel, PCR - Cerebrospinal Fluid, Lumbar Puncture [[id number]]  (Normal) Collected: 06/11/25 1440    Lab Status: Final result Specimen: Cerebrospinal Fluid from Lumbar Puncture Updated: 06/11/25 1653     ESCHERICHIA COLI K1, PCR Not Detected     HAEMOPHILUS INFLUENZAE, PCR Not Detected     LISTERIA MONOCYTOGENES, PCR Not Detected     NEISSERIA MENINGITIDIS, PCR Not Detected     STREPTOCOCCUS AGALACTIAE, PCR Not Detected     STREPTOCOCCUS PNEUMONIAE, PCR Not Detected     CYTOMEGALOVIRUS (CMV), PCR Not Detected     ENTEROVIRUS, PCR Not Detected     HERPES SIMPLEX VIRUS 1 (HSV-1), PCR Not Detected     HERPES SIMPLEX VIRUS 2 (HSV-2), PCR Not Detected     HUMAN PARECHOVIRUS, PCR Not Detected     VARICELLA ZOSTER VIRUS (VZV), PCR Not Detected     CRYPTOCOCCUS NEOFORMANS / GATTII, PCR Not Detected     HUMAN HERPES VIRUS 6 PCR Not Detected    Blood Culture - Blood, Arm, Right [[id number]]  (Normal) Collected: 06/09/25 0917    Lab Status: Preliminary result Specimen: Blood from Arm, Right Updated: 06/12/25 1000     Blood Culture No growth at 3 days    Narrative:      Less than seven (7) mL's of blood was collected.  Insufficient quantity may yield false negative results.    Blood Culture - Blood, Hand, Left [[id number]]  (Normal) Collected: 06/09/25 0900    Lab Status: Preliminary result Specimen: Blood from Hand, Left Updated: 06/12/25 1015     Blood Culture No growth at 3 days    Narrative:      Aerobic Bottle  Only    Less than seven (7) mL's of blood was collected.  Insufficient quantity may yield false negative results.            CT Angiogram Head  Result Date: 6/11/2025  CT ANGIOGRAM HEAD, CT ANGIOGRAM NECK Date of Exam: 6/11/2025 3:03 PM EDT Indication: stroke. Comparison: Brain MRI from today Technique: CTA of the head was performed after the uneventful intravenous administration of 75 mL Isovue-370. Reconstructed coronal and sagittal images were also obtained. In addition, a 3-D volume rendered image was created for interpretation. Automated  exposure control and iterative reconstruction methods were used. FINDINGS: Vascular Findings: The right common carotid, internal carotid, middle cerebral, anterior cerebral, vertebral, and posterior cerebral arteries are patent without abrupt cut off or aneurysmal dilation. The left common carotid, internal carotid, middle cerebral, anterior cerebral, vertebral, and posterior cerebral arteries are patent without abrupt cut off or aneurysmal dilation. Basilar artery appears patent and appears unremarkable. Non-vascular Findings: For description of nonvascular intracranial findings, please refer to the brain MRI performed the same date. No acute abnormality is identified within the visualized soft tissue or bony structures of the neck. The visualized lung apices are clear.     1.No acute abnormality is identified within the large arteries of the head or neck. 2.No significant stenosis of the bilateral internal carotid arteries. Electronically Signed: Dilshad North MD  6/11/2025 4:30 PM EDT  Workstation ID: [id number]    CT Angiogram Neck  Result Date: 6/11/2025  CT ANGIOGRAM HEAD, CT ANGIOGRAM NECK Date of Exam: 6/11/2025 3:03 PM EDT Indication: stroke. Comparison: Brain MRI from today Technique: CTA of the head was performed after the uneventful intravenous administration of 75 mL Isovue-370. Reconstructed coronal and sagittal images were also obtained. In addition, a 3-D  volume rendered image was created for interpretation. Automated  exposure control and iterative reconstruction methods were used. FINDINGS: Vascular Findings: The right common carotid, internal carotid, middle cerebral, anterior cerebral, vertebral, and posterior cerebral arteries are patent without abrupt cut off or aneurysmal dilation. The left common carotid, internal carotid, middle cerebral, anterior cerebral, vertebral, and posterior cerebral arteries are patent without abrupt cut off or aneurysmal dilation. Basilar artery appears patent and appears unremarkable. Non-vascular Findings: For description of nonvascular intracranial findings, please refer to the brain MRI performed the same date. No acute abnormality is identified within the visualized soft tissue or bony structures of the neck. The visualized lung apices are clear.     1.No acute abnormality is identified within the large arteries of the head or neck. 2.No significant stenosis of the bilateral internal carotid arteries. Electronically Signed: Dilshad North MD  6/11/2025 4:30 PM EDT  Workstation ID: [id number]    IR LUMBAR PUNCTURE DIAGNOSTIC  Result Date: 6/11/2025  IR LUMBAR PUNCTURE DIAGNOSTIC Date of Exam: 6/11/2025 1:30 PM EDT Indication: MS workup.   Comparison: CT abdomen pelvis 6/9/2025. Technique:  Procedure, risks, complications, and side effects of lumbar puncture were discussed and reviewed in detail with the patient including the possibility for bleeding, infection, needle damage to surrounding structures, and the potential for a spinal headache. The patient indicated understanding and consented to the procedure.  The patient was placed in the prone position on the table. The back was prepped and draped in a sterile fashion. 1% lidocaine was used as local anesthetic to the skin and subcutaneous tissues. Under fluoroscopic guidance a 22 g spinal needle was advanced  at the L4-L5 level to the CSF space. Initial sample was blood tinged,  though subsequent fluid became clear and colorless. Approximately 6 cc of CSF was returned and collected in each of 4 numbered vials. Sample vials were labeled and sent to pathology for further analysis. The needle was removed. Fluoroscopic Time: 0.2 minutes Number of Images: 1 Findings/    Impression: Technically successful fluoroscopic-guided lumbar puncture, as above. Electronically Signed: Deep Tiwari MD  6/11/2025 3:59 PM EDT  Workstation ID: [id number]    MRI Cervical Spine With & Without Contrast  Result Date: 6/11/2025  MRI CERVICAL SPINE W WO CONTRAST Date of Exam: 6/11/2025 12:03 AM EDT Indication: MS eval.  Comparison: None available. Technique:  Routine multiplanar/multisequence sequence images of the cervical spine were obtained before and after the uneventful administration of 20 mL Multihance.  Findings: Seven cervical vertebrae are identified. Alignment is anatomic. Disc spaces maintain normal height. Vertebral bodies maintain normal height.  There is no focal bone marrow edema. There is no evidence of a marrow replacing process. No acute osseous abnormalities. Spinal cord signal is normal. Paraspinal musculature is grossly preserved. There is no evidence of cervical lymphadenopathy or a neck mass. The craniocervical junction appears within normal limits. Limited view of the posterior fossa contents is unremarkable. No abnormal enhancement. C2-C3: The posterior disc is unremarkable. The uncovertebral joints appear within normal limits. Mild left facet hypertrophy. No significant neural foraminal or spinal canal stenosis. C3-C4: The posterior disc is unremarkable. The uncovertebral joints appear within normal limits. Mild left facet hypertrophy. No significant neural foraminal or spinal canal stenosis. C4-C5: The posterior disc is unremarkable. The uncovertebral joints appear within normal limits. No significant facet arthropathy. No significant neural foraminal or spinal canal stenosis. C5-C6: The  posterior disc is unremarkable. The uncovertebral joints appear within normal limits. No significant facet arthropathy. No significant neural foraminal or spinal canal stenosis. C6-C7: The posterior disc is unremarkable. The uncovertebral joints appear within normal limits. No significant facet arthropathy. No significant neural foraminal or spinal canal stenosis. C7-T1: The posterior disc is unremarkable. No significant facet arthropathy. No significant neural foraminal or spinal canal stenosis.     Impression: 1.No evidence of demyelinating disease in the cervical spine. 2.Mild left-sided facet arthritis. Electronically Signed: Tutu Phillips MD  6/11/2025 3:38 AM EDT  Workstation ID: [id number]    MRI Brain With & Without Contrast  Result Date: 6/11/2025  MRI BRAIN W WO CONTRAST Date of Exam: 6/11/2025 12:03 AM EDT Indication: follow up contrasted portion of possible demyelination seen on noncontrasted MRI. MS protocol with saggital FLAIR.  Comparison: None available. Technique:  Routine multiplanar/multisequence sequence images of the brain were obtained before and after the uneventful administration of 20 mL Multihance. Findings: There is a small ovoid focus of relative diffusion restriction again noted in the left frontoparietal white matter. It measures approximately 8 mm in diameter. There is subtle enhancement. This may relate to a focus of demyelination or subacute lacunar infarct. There are numerous scattered FLAIR hyperintensities within the cerebral white matter. This is predominantly periventricular, but there are numerous subcortical foci, as well. There is no evidence of an acute infarct. The pattern of white matter disease could be from demyelinating disease although chronic small vessel ischemic change is certainly possible. Cerebral cortex appears intact. There is a small focus of FLAIR hyperintense signal within the left putamen. Cerebellum and brainstem appear unremarkable. Major arterial flow  voids appear intact. There is severe left and mild right maxillary sinus mucosal disease. Mastoid air cells appear well aerated. Orbits are symmetric. Optic nerves are normal. The midline structures appear intact. Calvarial and superficial soft tissue signal is within normal limits.     Impression: 1.Small focus of relative diffusion restriction in the left frontoparietal white matter with subtle enhancement. This could relate to a focus of demyelination or subacute lacunar infarct. 2.Numerous scattered FLAIR hyperintensities in the cerebral white matter. This could be from demyelinating disease or chronic small vessel ischemic change. 3.Severe left and mild right maxillary sinus mucosal disease. Electronically Signed: Tutu Phillips MD  6/11/2025 3:21 AM EDT  Workstation ID: [id number]    MRI Brain Without Contrast  Result Date: 6/9/2025  MRI BRAIN WO CONTRAST Date of Exam: 6/9/2025 8:50 PM EDT Indication: Visual hallucinations, SBP >200 mmHg, vomiting, r/o PRES.  Comparison: CT of the head performed on the same date. Technique:  Routine multiplanar/multisequence sequence images of the brain were obtained without contrast administration. Findings: No area of restricted diffusion is identified to suggest an acute intracranial infarct. Multifocal areas of nonspecific increased T2/FLAIR signal predominantly within the periventricular white matter. The ventricles and sulci are normal in size  and configuration. No intracranial mass or mass effect. No extra-axial mass or collection. The posterior fossa is normal. Sellar and suprasellar structures are normal. The major intracranial flow-voids of the [place] are patent. Orbital and periorbital soft tissues are normal. Severe mucosal thickening of the left maxillary sinus. Mucoid retention cysts or polyps within the right maxillary sinus.     Impression: White matter changes likely demyelinating disease with differential considerations including sequela prior  inflammation or infection, vasculitis, or chronic small vessel/microangiopathic ischemic changes Electronically Signed: Kyle Hurt MD  6/9/2025 9:20 PM EDT  Workstation ID: [id number]    CT Angiogram Chest  Result Date: 6/9/2025  CT ANGIOGRAM CHEST, CT ABDOMEN PELVIS W CONTRAST Date of Exam: 6/9/2025 11:00 AM EDT Indication: Hallucinations, tachycardia, ill. Comparison: None available. Technique: CTA of the chest was performed after the uneventful intravenous administration of 85 mL Isovue-370. Reconstructed coronal and sagittal images were also obtained. In addition, a 3-D volume rendered image was created for interpretation. Automated exposure control and iterative reconstruction methods were used. FINDINGS: Thoracic inlet: Unremarkable. Pulmonary arteries: Suboptimal contrast bolus timing limit evaluation of the small peripheral pulmonary arteries. No large central pulmonary embolism is seen. Great vessels: The thoracic aorta and proximal arch vessels appear unremarkable. Mediastinum/Ivy: No pathologically enlarged mediastinal lymph nodes are seen. The esophagus is unremarkable. Lung parenchyma: The lungs appear adequately aerated. No acute consolidation is seen. No suspicious pulmonary nodules are seen. Trachea and airways: The trachea and central airways appear unremarkable. Pleural space: No significant pleural effusion or pneumothorax is seen. Heart and pericardium: The heart and pericardium appear unremarkable. Liver: The liver is prominent in size and decreased in attenuation. No focal liver lesion is seen. No biliary dilation is seen. Gallbladder: Surgically absent. Pancreas: Unremarkable. Spleen: Unremarkable. Adrenal glands: Unremarkable. Genitourinary tract: Peripelvic cysts noted within the left kidney. Kidneys are otherwise unremarkable. No hydronephrosis is seen. The visualized portions of the ureters and urinary bladder appear unremarkable. 5 cm mass associated with the right uterus,  likely a  fibroid. Gastrointestinal tract: A few scattered colonic diverticula are seen. The hollow viscera appear otherwise unremarkable. There is no evidence of bowel obstruction. Appendix: The appendix is not identified. 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 No free air or free fluid is identified. No pathologically enlarged lymph nodes are seen. The abdominal aorta and IVC appear unremarkable. Bones and soft tissues: No acute or suspicious osseous or soft tissue lesion is identified.     1.Suboptimal contrast bolus timing limits evaluation of the small peripheral pulmonary arteries. No large central pulmonary embolism is seen. 2.No acute abnormality is identified within the chest, abdomen, or pelvis. 3.Enlarged, fatty liver. 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 5.Probable 5 cm fibroid within the right uterus. 6.Additional findings as detailed above. Electronically Signed: Dilshad North MD  6/9/2025 11:36 AM EDT  Workstation ID: [id number]    CT Abdomen Pelvis With Contrast  Result Date: 6/9/2025  CT ANGIOGRAM CHEST, CT ABDOMEN PELVIS W CONTRAST Date of Exam: 6/9/2025 11:00 AM EDT Indication: Hallucinations, tachycardia, ill. Comparison: None available. Technique: CTA of the chest was performed after the uneventful intravenous administration of 85 mL Isovue-370. Reconstructed coronal and sagittal images were also obtained. In addition, a 3-D volume rendered image was created for interpretation. Automated exposure control and iterative reconstruction methods were used. FINDINGS: Thoracic inlet: Unremarkable. Pulmonary arteries: Suboptimal contrast bolus timing limit evaluation of the small peripheral pulmonary arteries. No large central pulmonary embolism is seen. Great vessels: The thoracic aorta and proximal arch vessels appear unremarkable. Mediastinum/Ivy: No  pathologically enlarged mediastinal lymph nodes are seen. The esophagus is unremarkable. Lung parenchyma: The lungs appear adequately aerated. No acute consolidation is seen. No suspicious pulmonary nodules are seen. Trachea and airways: The trachea and central airways appear unremarkable. Pleural space: No significant pleural effusion or pneumothorax is seen. Heart and pericardium: The heart and pericardium appear unremarkable. Liver: The liver is prominent in size and decreased in attenuation. No focal liver lesion is seen. No biliary dilation is seen. Gallbladder: Surgically absent. Pancreas: Unremarkable. Spleen: Unremarkable. Adrenal glands: Unremarkable. Genitourinary tract: Peripelvic cysts noted within the left kidney. Kidneys are otherwise unremarkable. No hydronephrosis is seen. The visualized portions of the ureters and urinary bladder appear unremarkable. 5 cm mass associated with the right uterus,  likely a fibroid. Gastrointestinal tract: A few scattered colonic diverticula are seen. The hollow viscera appear otherwise unremarkable. There is no evidence of bowel obstruction. Appendix: The appendix is not identified. 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 No free air or free fluid is identified. No pathologically enlarged lymph nodes are seen. The abdominal aorta and IVC appear unremarkable. Bones and soft tissues: No acute or suspicious osseous or soft tissue lesion is identified.     1.Suboptimal contrast bolus timing limits evaluation of the small peripheral pulmonary arteries. No large central pulmonary embolism is seen. 2.No acute abnormality is identified within the chest, abdomen, or pelvis. 3.Enlarged, fatty liver. 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 5.Probable 5 cm fibroid within the right uterus. 6.Additional findings as detailed above.  Electronically Signed: Dilshad North MD  6/9/2025 11:36 AM EDT  Workstation ID: [id number]    XR Chest 1 View  Result Date: 6/9/2025  XR CHEST 1 VW Date of Exam: 6/9/2025 9:19 AM EDT Indication: elevated BNP Comparison: None available. Findings: Cardiomediastinal silhouette is unremarkable.  No airspace disease, pneumothorax, nor pleural effusion. No acute osseous abnormality identified.     Impression: No acute cardiopulmonary process. Electronically Signed: Dale Combs MD  6/9/2025 9:41 AM EDT  Workstation ID: [id number]    CT Head Without Contrast  Result Date: 6/9/2025  CT HEAD WO CONTRAST Date of Exam: 6/9/2025 8:40 AM EDT Indication: ams. Comparison: None available. Technique: Axial CT images were obtained of the head without contrast administration.  Automated exposure control and iterative construction methods were used. Findings: No acute intracranial hemorrhage or extra-axial collection is identified. The ventricles appear normal in caliber, with no evidence of mass effect or midline shift. The basal cisterns appear patent. The gray-white differentiation appears preserved. The calvarium appear intact. There is moderate frothy paranasal sinus mucosal disease. The mastoid air cells are well-aerated.     Impression: 1.No acute intracranial process identified. 2.Moderate frothy paranasal sinus mucosal disease. Correlate for acute sinusitis. Electronically Signed: Sohail Reyes MD  6/9/2025 9:06 AM EDT  Workstation ID: [id number]                  Plan for Follow-up of Pending Labs/Results:   Pending Labs       Order Current Status    MS Profile & MBP, CSF and Serum - Cerebrospinal Fluid, Lumbar Puncture In process    Blood Culture - Blood, Arm, Right Preliminary result    Blood Culture - Blood, Hand, Left Preliminary result          Discharge Details        Discharge Medications        New Medications        Instructions Start Date   amLODIPine 10 MG tablet  Commonly known as: NORVASC   10 mg, Oral, Every 24  Hours Scheduled   Start Date: June 13, 2025     aspirin 81 MG EC tablet   81 mg, Oral, Daily      atorvastatin 80 MG tablet  Commonly known as: LIPITOR   80 mg, Oral, Nightly             Changes to Medications        Instructions Start Date   valsartan 320 MG tablet  Commonly known as: DIOVAN  What changed:   medication strength  how much to take   320 mg, Oral, Daily   Start Date: June 13, 2025            Continue These Medications        Instructions Start Date   levothyroxine 75 MCG tablet  Commonly known as: Synthroid   75 mcg, Oral, Every Early Morning      methotrexate 2.5 MG tablet   2.5 mg, Weekly      vitamin D 1.25 MG (15453 UT) capsule capsule  Commonly known as: ERGOCALCIFEROL   50,000 Units, Weekly             Stop These Medications      celecoxib 100 MG capsule  Commonly known as: CeleBREX              Allergies   Allergen Reactions    Morphine And Codeine          Discharge Disposition:  Home or Self Care    Diet:  Hospital:  Diet Order   Procedures    Diet: Regular/House; Fluid Consistency: Thin (IDDSI 0)            Activity:      Restrictions or Other Recommendations:         CODE STATUS:    Code Status and Medical Interventions: CPR (Attempt to Resuscitate); Full Support   Ordered at: 06/09/25 1400     Code Status (Patient has no pulse and is not breathing):    CPR (Attempt to Resuscitate)     Medical Interventions (Patient has pulse or is breathing):    Full Support       No future appointments.    Additional Instructions for the Follow-ups that You Need to Schedule       Discharge Follow-up with PCP   As directed       Currently Documented PCP:    Matilda Almendarez MD    PCP Phone Number:    [phone]     Follow Up Details: with PCP in 1 weeks        Discharge Follow-up with Specified Provider: f/u neurology, MINDI Fajardo; 1 Month   As directed      To: f/u neurology, MINDI Fajardo   Follow Up: 1 Month   Follow Up Details: 1-2 mos.  f/u MS profile and oligoclonal beds                       Yazan Summers MD  06/12/25      Time Spent on Discharge:  I spent  39  minutes on this discharge activity which included: face-to-face encounter with the patient, reviewing the data in the system, coordination of the care with the nursing staff as well as consultants, documentation, and entering orders.            Electronically signed by Yazan Summers MD at 06/12/25 [id number]

## 2025-06-13 NOTE — PLAN OF CARE
Goal Outcome Evaluation:   Patient alert and oriented x4. Sinus tachycardia. From 4L nasal cannula at sleep to room air - MD notified. Intermittent back pain/headache, prns given as requested. BP running high 160's-180's - MD notified multiple times - see orders. Patient eager to be able to d/c home. Family at bedside.

## 2025-06-13 NOTE — DISCHARGE SUMMARY
[facility] Medicine Services  DISCHARGE SUMMARY    Patient Name: Barbara Phillips  : 1975  MRN: [id number]    Date of Admission: 2025  7:09 AM  Date of Discharge: 2025  Primary Care Physician: Matilda Almendarez MD    Consults       Date and Time Order Name Status Description    6/10/2025  8:46 AM Inpatient Neurology Consult General Completed             Hospital Course     Presenting Problem:     Active Hospital Problems    Diagnosis  POA    **Intractable vomiting with nausea [R11.2]  Yes    Hypertensive emergency [I16.1]  Yes    HTN (hypertension) [I10]  Yes    Rheumatoid arthritis [M06.9]  Yes    Hypertensive crisis [I16.9]  Yes      Resolved Hospital Problems   No resolved problems to display.          Hospital Course:  Barbara Phillips is a 49 y.o. female w/ HTN, hypothyroid, rheumatoid/psoriatic arthritis, morbid obesity, holding her MTX for planned TKA and developed visual hallucinations ~4 days pta, then the night before arrival developed intractable N/V followed by a headache and was found to have BP of 237/139.     HTN Emergency  Abnl MRI likely c/w subacute CVA  Headache  Visual hallucinations  - My practice partner d/w neurology - feels mri finding more c/w cva given risk factors.   - Echo with LVEF 56 to 60%, trace MR, negative saline test  - Asa/statin  - s/p LP.  Neuro to f/u MS profile and oligoclonal bands outpatient   -Amlodipine, valsartan, hydralazine    Tachycardia  -Sinus tachycardia; -110 at rest and 120 w movement; when I evaluated, HR was   -CTA chest obtained and was negative for PE      Mesenteric Panniculitis  -- noted on CT A/P, however, no other s/s of active infection. Continue to monitor off ABX     Hypothyroidism   - cont home levothyroxine, recommend follow-up with PCP     RA (reports rheumatoid/psoriatic arthritis)   - typically takes MTX on , last dose 25     Morbid Obesity  -- complicates all aspects of  care    Discharge Follow Up Recommendations for outpatient labs/diagnostics:  Follow-up with PCP in 1 week regarding this hospitalization, chronic disease management, blood pressure management, repeat TSH  Follow-up with neuro, MINDI Luis in 1-2 mos, f/u MS profile and oligoclonal bands    Day of Discharge     HPI:   No nausea or vomiting.  No headache or visual changes.  No shortness of breath.  No cough.  No chest pain.  Feels ready to go home.    Review of Systems  As above    Vital Signs:   Temp:  [98.3 °F (36.8 °C)-98.8 °F (37.1 °C)] 98.3 °F (36.8 °C)  Heart Rate:  [] 100  Resp:  [17-18] 17  BP: (141-180)/() 141/81      Physical Exam:  Constitutional: No acute distress, awake, alert  HENT: NCAT, mucous membranes moist  Respiratory: Clear to auscultation bilaterally, respiratory effort normal   Cardiovascular: Heart rate 105, no murmur  Gastrointestinal: Positive bowel sounds, soft, nontender, nondistended  Musculoskeletal: No bilateral ankle edema  Psychiatric: Appropriate affect, cooperative  Neurologic: Alert, symmetric facies, moves all extremities, speech clear  Skin: No rashes    Pertinent  and/or Most Recent Results     LAB RESULTS:      Lab 06/11/25  0445 06/10/25  0447 06/09/25  1044 06/09/25  0917 06/09/25  0747   WBC 8.75 9.47  --   --  10.40   HEMOGLOBIN 11.9* 12.3  --   --  14.2   HEMATOCRIT 36.2 37.8  --   --  41.1   PLATELETS 429 403  --   --  497*   NEUTROS ABS 4.09 5.00  --   --  7.21*   IMMATURE GRANS (ABS) 0.03 0.02  --   --  0.03   LYMPHS ABS 2.99 2.88  --   --  2.13   MONOS ABS 0.81 0.93*  --   --  0.71   EOS ABS 0.76* 0.57*  --   --  0.27   MCV 91.0 91.5  --   --  85.6   SED RATE 26*  --   --   --  53*   CRP  --   --   --  1.50*  --    PROCALCITONIN  --   --   --   --  0.08   LACTATE  --   --  1.8  --  3.2*         Lab 06/11/25  1533 06/11/25  0445 06/10/25  1157 06/09/25  0747   SODIUM  --  140 142 141   POTASSIUM 4.1 3.6 3.8 4.5   CHLORIDE  --  107 107 103   CO2  --  22.0  23.0 18.0*   ANION GAP  --  11.0 12.0 20.0*   BUN  --  5.7* 6.2 13.6   CREATININE  --  0.65 0.69 0.97   EGFR  --  108.1 106.5 71.8   GLUCOSE  --  124* 116* 153*   CALCIUM  --  8.0* 8.3* 10.0   IONIZED CALCIUM  --   --   --  1.11*   MAGNESIUM  --   --  1.7 1.4*   HEMOGLOBIN A1C  --  6.30*  --   --    TSH  --  10.810*  --  9.890*         Lab 06/09/25  0747   TOTAL PROTEIN 8.5   ALBUMIN 4.7   GLOBULIN 3.8   ALT (SGPT) 11   AST (SGOT) 23   BILIRUBIN 0.4   ALK PHOS 112   LIPASE 13         Lab 06/09/25  0917 06/09/25  0747   PROBNP  --  1,104.0*   HSTROP T 25* 29*         Lab 06/11/25  0445   CHOLESTEROL 276*   LDL CHOL 189*   HDL CHOL 32*   TRIGLYCERIDES 279*             Brief Urine Lab Results  (Last result in the past 365 days)        Color   Clarity   Blood   Leuk Est   Nitrite   Protein   CREAT   Urine HCG        06/09/25 0738               Negative       06/09/25 0738 Yellow   Clear   Negative   Negative   Negative   30 mg/dL (1+)                 Microbiology Results (last 10 days)       Procedure Component Value - Date/Time    Meningitis / Encephalitis Panel, PCR - Cerebrospinal Fluid, Lumbar Puncture [[id number]]  (Normal) Collected: 06/11/25 1440    Lab Status: Final result Specimen: Cerebrospinal Fluid from Lumbar Puncture Updated: 06/11/25 8352     ESCHERICHIA COLI K1, PCR Not Detected     HAEMOPHILUS INFLUENZAE, PCR Not Detected     LISTERIA MONOCYTOGENES, PCR Not Detected     NEISSERIA MENINGITIDIS, PCR Not Detected     STREPTOCOCCUS AGALACTIAE, PCR Not Detected     STREPTOCOCCUS PNEUMONIAE, PCR Not Detected     CYTOMEGALOVIRUS (CMV), PCR Not Detected     ENTEROVIRUS, PCR Not Detected     HERPES SIMPLEX VIRUS 1 (HSV-1), PCR Not Detected     HERPES SIMPLEX VIRUS 2 (HSV-2), PCR Not Detected     HUMAN PARECHOVIRUS, PCR Not Detected     VARICELLA ZOSTER VIRUS (VZV), PCR Not Detected     CRYPTOCOCCUS NEOFORMANS / GATTII, PCR Not Detected     HUMAN HERPES VIRUS 6 PCR Not Detected    Blood Culture - Blood, Arm, Right  [[id number]]  (Normal) Collected: 06/09/25 0917    Lab Status: Preliminary result Specimen: Blood from Arm, Right Updated: 06/13/25 1001     Blood Culture No growth at 4 days    Narrative:      Less than seven (7) mL's of blood was collected.  Insufficient quantity may yield false negative results.    Blood Culture - Blood, Hand, Left [[id number]]  (Normal) Collected: 06/09/25 0900    Lab Status: Preliminary result Specimen: Blood from Hand, Left Updated: 06/13/25 1001     Blood Culture No growth at 4 days    Narrative:      Aerobic Bottle Only    Less than seven (7) mL's of blood was collected.  Insufficient quantity may yield false negative results.            CT Angiogram Chest  Result Date: 6/13/2025  CT ANGIOGRAM CHEST Date of Exam: 6/13/2025 10:27 AM EDT Indication: tachycardia, persistent, borderline O2 sats, r/o PE. Comparison: Chest CTA dated 6/9/2025 Technique: CTA of the chest was performed before and after the uneventful intravenous administration of 85 mL Isovue-370. Reconstructed coronal and sagittal images were also obtained. In addition, a 3-D volume rendered image was created for interpretation. Automated exposure control and iterative reconstruction methods were used. FINDINGS: Thoracic inlet: Unremarkable. Pulmonary arteries: No filling defects are identified within the pulmonary arteries to suggest acute pulmonary embolism. Great vessels: The thoracic aorta and proximal arch vessels appear unremarkable. Mediastinum/Ivy: No pathologically enlarged mediastinal lymph nodes are seen. The esophagus appears unremarkable. Lung parenchyma: The lungs appear adequately aerated. No acute consolidation is seen. No suspicious pulmonary nodules are seen. Trachea and airways: The trachea and central airways appear unremarkable. Pleural space: No significant pleural effusion or pneumothorax is seen. Heart and pericardium: The heart and pericardium appear unremarkable. Chest wall: No acute or suspicious osseous or  soft tissue lesion is identified. Upper abdomen: No acute abnormality is identified within the visualized upper abdomen. Suggestion of hepatic steatosis. Status post cholecystectomy.     1.No acute abnormality is identified within the thorax. Specifically, there is no evidence of acute pulmonary embolism. 2.Nonemergent findings as detailed above. Electronically Signed: Dilshad North MD  6/13/2025 11:20 AM EDT  Workstation ID: [id number]    CT Angiogram Head  Result Date: 6/11/2025  CT ANGIOGRAM HEAD, CT ANGIOGRAM NECK Date of Exam: 6/11/2025 3:03 PM EDT Indication: stroke. Comparison: Brain MRI from today Technique: CTA of the head was performed after the uneventful intravenous administration of 75 mL Isovue-370. Reconstructed coronal and sagittal images were also obtained. In addition, a 3-D volume rendered image was created for interpretation. Automated  exposure control and iterative reconstruction methods were used. FINDINGS: Vascular Findings: The right common carotid, internal carotid, middle cerebral, anterior cerebral, vertebral, and posterior cerebral arteries are patent without abrupt cut off or aneurysmal dilation. The left common carotid, internal carotid, middle cerebral, anterior cerebral, vertebral, and posterior cerebral arteries are patent without abrupt cut off or aneurysmal dilation. Basilar artery appears patent and appears unremarkable. Non-vascular Findings: For description of nonvascular intracranial findings, please refer to the brain MRI performed the same date. No acute abnormality is identified within the visualized soft tissue or bony structures of the neck. The visualized lung apices are clear.     1.No acute abnormality is identified within the large arteries of the head or neck. 2.No significant stenosis of the bilateral internal carotid arteries. Electronically Signed: Dilshad North MD  6/11/2025 4:30 PM EDT  Workstation ID: [id number]    CT Angiogram Neck  Result Date: 6/11/2025  CT  ANGIOGRAM HEAD, CT ANGIOGRAM NECK Date of Exam: 6/11/2025 3:03 PM EDT Indication: stroke. Comparison: Brain MRI from today Technique: CTA of the head was performed after the uneventful intravenous administration of 75 mL Isovue-370. Reconstructed coronal and sagittal images were also obtained. In addition, a 3-D volume rendered image was created for interpretation. Automated  exposure control and iterative reconstruction methods were used. FINDINGS: Vascular Findings: The right common carotid, internal carotid, middle cerebral, anterior cerebral, vertebral, and posterior cerebral arteries are patent without abrupt cut off or aneurysmal dilation. The left common carotid, internal carotid, middle cerebral, anterior cerebral, vertebral, and posterior cerebral arteries are patent without abrupt cut off or aneurysmal dilation. Basilar artery appears patent and appears unremarkable. Non-vascular Findings: For description of nonvascular intracranial findings, please refer to the brain MRI performed the same date. No acute abnormality is identified within the visualized soft tissue or bony structures of the neck. The visualized lung apices are clear.     1.No acute abnormality is identified within the large arteries of the head or neck. 2.No significant stenosis of the bilateral internal carotid arteries. Electronically Signed: Dilshad North MD  6/11/2025 4:30 PM EDT  Workstation ID: [id number]    IR LUMBAR PUNCTURE DIAGNOSTIC  Result Date: 6/11/2025  IR LUMBAR PUNCTURE DIAGNOSTIC Date of Exam: 6/11/2025 1:30 PM EDT Indication: MS workup.   Comparison: CT abdomen pelvis 6/9/2025. Technique:  Procedure, risks, complications, and side effects of lumbar puncture were discussed and reviewed in detail with the patient including the possibility for bleeding, infection, needle damage to surrounding structures, and the potential for a spinal headache. The patient indicated understanding and consented to the procedure.  The patient  was placed in the prone position on the table. The back was prepped and draped in a sterile fashion. 1% lidocaine was used as local anesthetic to the skin and subcutaneous tissues. Under fluoroscopic guidance a 22 g spinal needle was advanced  at the L4-L5 level to the CSF space. Initial sample was blood tinged, though subsequent fluid became clear and colorless. Approximately 6 cc of CSF was returned and collected in each of 4 numbered vials. Sample vials were labeled and sent to pathology for further analysis. The needle was removed. Fluoroscopic Time: 0.2 minutes Number of Images: 1 Findings/    Impression: Technically successful fluoroscopic-guided lumbar puncture, as above. Electronically Signed: Deep Tiwari MD  6/11/2025 3:59 PM EDT  Workstation ID: [id number]    MRI Cervical Spine With & Without Contrast  Result Date: 6/11/2025  MRI CERVICAL SPINE W WO CONTRAST Date of Exam: 6/11/2025 12:03 AM EDT Indication: MS eval.  Comparison: None available. Technique:  Routine multiplanar/multisequence sequence images of the cervical spine were obtained before and after the uneventful administration of 20 mL Multihance.  Findings: Seven cervical vertebrae are identified. Alignment is anatomic. Disc spaces maintain normal height. Vertebral bodies maintain normal height.  There is no focal bone marrow edema. There is no evidence of a marrow replacing process. No acute osseous abnormalities. Spinal cord signal is normal. Paraspinal musculature is grossly preserved. There is no evidence of cervical lymphadenopathy or a neck mass. The craniocervical junction appears within normal limits. Limited view of the posterior fossa contents is unremarkable. No abnormal enhancement. C2-C3: The posterior disc is unremarkable. The uncovertebral joints appear within normal limits. Mild left facet hypertrophy. No significant neural foraminal or spinal canal stenosis. C3-C4: The posterior disc is unremarkable. The uncovertebral joints  appear within normal limits. Mild left facet hypertrophy. No significant neural foraminal or spinal canal stenosis. C4-C5: The posterior disc is unremarkable. The uncovertebral joints appear within normal limits. No significant facet arthropathy. No significant neural foraminal or spinal canal stenosis. C5-C6: The posterior disc is unremarkable. The uncovertebral joints appear within normal limits. No significant facet arthropathy. No significant neural foraminal or spinal canal stenosis. C6-C7: The posterior disc is unremarkable. The uncovertebral joints appear within normal limits. No significant facet arthropathy. No significant neural foraminal or spinal canal stenosis. C7-T1: The posterior disc is unremarkable. No significant facet arthropathy. No significant neural foraminal or spinal canal stenosis.     Impression: 1.No evidence of demyelinating disease in the cervical spine. 2.Mild left-sided facet arthritis. Electronically Signed: Tutu Phillips MD  6/11/2025 3:38 AM EDT  Workstation ID: [id number]    MRI Brain With & Without Contrast  Result Date: 6/11/2025  MRI BRAIN W WO CONTRAST Date of Exam: 6/11/2025 12:03 AM EDT Indication: follow up contrasted portion of possible demyelination seen on noncontrasted MRI. MS protocol with saggital FLAIR.  Comparison: None available. Technique:  Routine multiplanar/multisequence sequence images of the brain were obtained before and after the uneventful administration of 20 mL Multihance. Findings: There is a small ovoid focus of relative diffusion restriction again noted in the left frontoparietal white matter. It measures approximately 8 mm in diameter. There is subtle enhancement. This may relate to a focus of demyelination or subacute lacunar infarct. There are numerous scattered FLAIR hyperintensities within the cerebral white matter. This is predominantly periventricular, but there are numerous subcortical foci, as well. There is no evidence of an acute infarct. The  pattern of white matter disease could be from demyelinating disease although chronic small vessel ischemic change is certainly possible. Cerebral cortex appears intact. There is a small focus of FLAIR hyperintense signal within the left putamen. Cerebellum and brainstem appear unremarkable. Major arterial flow voids appear intact. There is severe left and mild right maxillary sinus mucosal disease. Mastoid air cells appear well aerated. Orbits are symmetric. Optic nerves are normal. The midline structures appear intact. Calvarial and superficial soft tissue signal is within normal limits.     Impression: 1.Small focus of relative diffusion restriction in the left frontoparietal white matter with subtle enhancement. This could relate to a focus of demyelination or subacute lacunar infarct. 2.Numerous scattered FLAIR hyperintensities in the cerebral white matter. This could be from demyelinating disease or chronic small vessel ischemic change. 3.Severe left and mild right maxillary sinus mucosal disease. Electronically Signed: Tutu Phillips MD  6/11/2025 3:21 AM EDT  Workstation ID: [id number]    MRI Brain Without Contrast  Result Date: 6/9/2025  MRI BRAIN WO CONTRAST Date of Exam: 6/9/2025 8:50 PM EDT Indication: Visual hallucinations, SBP >200 mmHg, vomiting, r/o PRES.  Comparison: CT of the head performed on the same date. Technique:  Routine multiplanar/multisequence sequence images of the brain were obtained without contrast administration. Findings: No area of restricted diffusion is identified to suggest an acute intracranial infarct. Multifocal areas of nonspecific increased T2/FLAIR signal predominantly within the periventricular white matter. The ventricles and sulci are normal in size  and configuration. No intracranial mass or mass effect. No extra-axial mass or collection. The posterior fossa is normal. Sellar and suprasellar structures are normal. The major intracranial flow-voids of the [place]  are patent. Orbital and periorbital soft tissues are normal. Severe mucosal thickening of the left maxillary sinus. Mucoid retention cysts or polyps within the right maxillary sinus.     Impression: White matter changes likely demyelinating disease with differential considerations including sequela prior inflammation or infection, vasculitis, or chronic small vessel/microangiopathic ischemic changes Electronically Signed: Kyle Hurt MD  6/9/2025 9:20 PM EDT  Workstation ID: [id number]    CT Angiogram Chest  Result Date: 6/9/2025  CT ANGIOGRAM CHEST, CT ABDOMEN PELVIS W CONTRAST Date of Exam: 6/9/2025 11:00 AM EDT Indication: Hallucinations, tachycardia, ill. Comparison: None available. Technique: CTA of the chest was performed after the uneventful intravenous administration of 85 mL Isovue-370. Reconstructed coronal and sagittal images were also obtained. In addition, a 3-D volume rendered image was created for interpretation. Automated exposure control and iterative reconstruction methods were used. FINDINGS: Thoracic inlet: Unremarkable. Pulmonary arteries: Suboptimal contrast bolus timing limit evaluation of the small peripheral pulmonary arteries. No large central pulmonary embolism is seen. Great vessels: The thoracic aorta and proximal arch vessels appear unremarkable. Mediastinum/Ivy: No pathologically enlarged mediastinal lymph nodes are seen. The esophagus is unremarkable. Lung parenchyma: The lungs appear adequately aerated. No acute consolidation is seen. No suspicious pulmonary nodules are seen. Trachea and airways: The trachea and central airways appear unremarkable. Pleural space: No significant pleural effusion or pneumothorax is seen. Heart and pericardium: The heart and pericardium appear unremarkable. Liver: The liver is prominent in size and decreased in attenuation. No focal liver lesion is seen. No biliary dilation is seen. Gallbladder: Surgically absent. Pancreas: Unremarkable. Spleen:  Unremarkable. Adrenal glands: Unremarkable. Genitourinary tract: Peripelvic cysts noted within the left kidney. Kidneys are otherwise unremarkable. No hydronephrosis is seen. The visualized portions of the ureters and urinary bladder appear unremarkable. 5 cm mass associated with the right uterus,  likely a fibroid. Gastrointestinal tract: A few scattered colonic diverticula are seen. The hollow viscera appear otherwise unremarkable. There is no evidence of bowel obstruction. Appendix: The appendix is not identified. 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 No free air or free fluid is identified. No pathologically enlarged lymph nodes are seen. The abdominal aorta and IVC appear unremarkable. Bones and soft tissues: No acute or suspicious osseous or soft tissue lesion is identified.     1.Suboptimal contrast bolus timing limits evaluation of the small peripheral pulmonary arteries. No large central pulmonary embolism is seen. 2.No acute abnormality is identified within the chest, abdomen, or pelvis. 3.Enlarged, fatty liver. 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 5.Probable 5 cm fibroid within the right uterus. 6.Additional findings as detailed above. Electronically Signed: Dilshad North MD  6/9/2025 11:36 AM EDT  Workstation ID: [id number]    CT Abdomen Pelvis With Contrast  Result Date: 6/9/2025  CT ANGIOGRAM CHEST, CT ABDOMEN PELVIS W CONTRAST Date of Exam: 6/9/2025 11:00 AM EDT Indication: Hallucinations, tachycardia, ill. Comparison: None available. Technique: CTA of the chest was performed after the uneventful intravenous administration of 85 mL Isovue-370. Reconstructed coronal and sagittal images were also obtained. In addition, a 3-D volume rendered image was created for interpretation. Automated exposure control and iterative reconstruction methods were  used. FINDINGS: Thoracic inlet: Unremarkable. Pulmonary arteries: Suboptimal contrast bolus timing limit evaluation of the small peripheral pulmonary arteries. No large central pulmonary embolism is seen. Great vessels: The thoracic aorta and proximal arch vessels appear unremarkable. Mediastinum/Ivy: No pathologically enlarged mediastinal lymph nodes are seen. The esophagus is unremarkable. Lung parenchyma: The lungs appear adequately aerated. No acute consolidation is seen. No suspicious pulmonary nodules are seen. Trachea and airways: The trachea and central airways appear unremarkable. Pleural space: No significant pleural effusion or pneumothorax is seen. Heart and pericardium: The heart and pericardium appear unremarkable. Liver: The liver is prominent in size and decreased in attenuation. No focal liver lesion is seen. No biliary dilation is seen. Gallbladder: Surgically absent. Pancreas: Unremarkable. Spleen: Unremarkable. Adrenal glands: Unremarkable. Genitourinary tract: Peripelvic cysts noted within the left kidney. Kidneys are otherwise unremarkable. No hydronephrosis is seen. The visualized portions of the ureters and urinary bladder appear unremarkable. 5 cm mass associated with the right uterus,  likely a fibroid. Gastrointestinal tract: A few scattered colonic diverticula are seen. The hollow viscera appear otherwise unremarkable. There is no evidence of bowel obstruction. Appendix: The appendix is not identified. 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 No free air or free fluid is identified. No pathologically enlarged lymph nodes are seen. The abdominal aorta and IVC appear unremarkable. Bones and soft tissues: No acute or suspicious osseous or soft tissue lesion is identified.     1.Suboptimal contrast bolus timing limits evaluation of the small peripheral pulmonary arteries. No large central pulmonary embolism is  seen. 2.No acute abnormality is identified within the chest, abdomen, or pelvis. 3.Enlarged, fatty liver. 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 5.Probable 5 cm fibroid within the right uterus. 6.Additional findings as detailed above. Electronically Signed: Dilshad North MD  6/9/2025 11:36 AM EDT  Workstation ID: [id number]    XR Chest 1 View  Result Date: 6/9/2025  XR CHEST 1 VW Date of Exam: 6/9/2025 9:19 AM EDT Indication: elevated BNP Comparison: None available. Findings: Cardiomediastinal silhouette is unremarkable.  No airspace disease, pneumothorax, nor pleural effusion. No acute osseous abnormality identified.     Impression: No acute cardiopulmonary process. Electronically Signed: Dale Combs MD  6/9/2025 9:41 AM EDT  Workstation ID: [id number]    CT Head Without Contrast  Result Date: 6/9/2025  CT HEAD WO CONTRAST Date of Exam: 6/9/2025 8:40 AM EDT Indication: ams. Comparison: None available. Technique: Axial CT images were obtained of the head without contrast administration.  Automated exposure control and iterative construction methods were used. Findings: No acute intracranial hemorrhage or extra-axial collection is identified. The ventricles appear normal in caliber, with no evidence of mass effect or midline shift. The basal cisterns appear patent. The gray-white differentiation appears preserved. The calvarium appear intact. There is moderate frothy paranasal sinus mucosal disease. The mastoid air cells are well-aerated.     Impression: 1.No acute intracranial process identified. 2.Moderate frothy paranasal sinus mucosal disease. Correlate for acute sinusitis. Electronically Signed: Sohail Reyes MD  6/9/2025 9:06 AM EDT  Workstation ID: [id number]              Results for orders placed during the hospital encounter of 06/09/25    Adult Transthoracic Echo Complete W/ Cont if Necessary Per Protocol (With Agitated  Saline)    Interpretation Summary    Left ventricular ejection fraction appears to be 56 - 60%.    The left atrial cavity is mildly dilated.    Trace mitral regurgitation.    Saline test results are negative.      Plan for Follow-up of Pending Labs/Results: Neuro outpatient   Pending Labs       Order Current Status    MS Profile & MBP, CSF and Serum - Cerebrospinal Fluid, Lumbar Puncture In process    Blood Culture - Blood, Arm, Right Preliminary result    Blood Culture - Blood, Hand, Left Preliminary result          Discharge Details        Discharge Medications        New Medications        Instructions Start Date   amLODIPine 10 MG tablet  Commonly known as: NORVASC   10 mg, Oral, Every 24 Hours Scheduled      Aspirin Low Dose 81 MG EC tablet  Generic drug: aspirin   81 mg, Oral, Daily      atorvastatin 80 MG tablet  Commonly known as: LIPITOR   80 mg, Oral, Nightly      hydrALAZINE 25 MG tablet  Commonly known as: APRESOLINE   25 mg, Oral, Every 8 Hours Scheduled      PHARMACY MEDS TO BED CONSULT   Not Applicable, Daily             Changes to Medications        Instructions Start Date   valsartan 320 MG tablet  Commonly known as: DIOVAN  What changed:   medication strength  how much to take   320 mg, Oral, Daily             Continue These Medications        Instructions Start Date   levothyroxine 75 MCG tablet  Commonly known as: Synthroid   75 mcg, Oral, Every Early Morning      methotrexate 2.5 MG tablet   2.5 mg, Weekly      vitamin D 1.25 MG (34076 UT) capsule capsule  Commonly known as: ERGOCALCIFEROL   50,000 Units, Weekly             Stop These Medications      celecoxib 100 MG capsule  Commonly known as: CeleBREX              Allergies   Allergen Reactions    Morphine And Codeine          Discharge Disposition:  Home or Self Care    Diet:  Hospital:  Diet Order   Procedures    Diet: Regular/House; Fluid Consistency: Thin (IDDSI 0)       Diet Instructions       Diet: Cardiac Diets; Healthy Heart (2-3  Na+); Regular (IDDSI 7); Thin (IDDSI 0)      Discharge Diet: Cardiac Diets    Cardiac Diet: Healthy Heart (2-3 Na+)    Texture: Regular (IDDSI 7)    Fluid Consistency: Thin (IDDSI 0)             Activity:      Restrictions or Other Recommendations:         CODE STATUS:    Code Status and Medical Interventions: CPR (Attempt to Resuscitate); Full Support   Ordered at: 06/09/25 1400     Code Status (Patient has no pulse and is not breathing):    CPR (Attempt to Resuscitate)     Medical Interventions (Patient has pulse or is breathing):    Full Support       Future Appointments   Date Time Provider Department Center   7/21/2025  9:30 AM Shilpi López APRN [facility]       Additional Instructions for the Follow-ups that You Need to Schedule       Call MD With Problems / Concerns   As directed      Please seek medical attention for any of the following: Difficulty breathing, chest pain, strokelike symptoms, vomiting blood, and/or any other concerning symptoms    Order Comments: Please seek medical attention for any of the following: Difficulty breathing, chest pain, strokelike symptoms, vomiting blood, and/or any other concerning symptoms         Discharge Follow-up with PCP   As directed       Currently Documented PCP:    Matilda Almendarez MD    PCP Phone Number:    [phone]     Follow Up Details: with PCP in 1 weeks        Discharge Follow-up with Specified Provider: f/u neurology, MINDI Fajardo; 1 Month   As directed      To: f/u neurologyJARVIS APRN   Follow Up: 1 Month   Follow Up Details: 1-2 mos.  f/u MS profile and oligoclonal beds                      Elizabeth Art MD  06/13/25      Time Spent on Discharge:  I spent  35  minutes on this discharge activity which included: face-to-face encounter with the patient, reviewing the data in the system, coordination of the care with the nursing staff as well as consultants, documentation, and entering orders.

## 2025-06-14 LAB
BACTERIA SPEC AEROBE CULT: NORMAL
BACTERIA SPEC AEROBE CULT: NORMAL
QT INTERVAL: 344 MS
QTC INTERVAL: 482 MS

## 2025-06-14 NOTE — OUTREACH NOTE
Prep Survey      Flowsheet Row Responses   [facility] facility patient discharged from? [place]   Is LACE score < 7 ? No   Eligibility Readm Mgmt   Discharge diagnosis *Intractable vomiting with nausea   Does the patient have one of the following disease processes/diagnoses(primary or secondary)? Other   Does the patient have Home health ordered? No   Is there a DME ordered? No   Prep survey completed? Yes            CRISTOPHER CHING - Registered Nurse

## 2025-06-18 ENCOUNTER — READMISSION MANAGEMENT (OUTPATIENT)
Dept: CALL CENTER | Facility: HOSPITAL | Age: 50
End: 2025-06-18
Payer: COMMERCIAL

## 2025-06-18 LAB
ALB CSF/SERPL: 7 {RATIO} (ref 0–8)
ALBUMIN CSF-MCNC: 30 MG/DL (ref 8–37)
ALBUMIN SERPL-MCNC: 4.2 G/DL (ref 3.9–4.9)
IGG CSF-MCNC: 6.2 MG/DL (ref 0–6.7)
IGG SERPL-MCNC: 1425 MG/DL (ref 586–1602)
IGG SYNTH RATE SER+CSF CALC-MRATE: 3.1 MG/DAY
IGG/ALB CLEAR SER+CSF-RTO: 0.6 (ref 0–0.7)
IGG/ALB CSF: 0.21 {RATIO} (ref 0–0.25)
MBP CSF-MCNC: 14 NG/ML (ref 0–3.7)
OLIGOCLONAL BANDS.IT SER+CSF QL: ABNORMAL

## 2025-06-18 NOTE — OUTREACH NOTE
Medical Week 1 Survey      Flowsheet Row Responses   [facility] patient discharged from? [place]   Does the patient have one of the following disease processes/diagnoses(primary or secondary)? Other   Week 1 attempt successful? Yes   Call start time 0826   Call end time 0832   Discharge diagnosis *Intractable vomiting with nausea   Meds reviewed with patient/caregiver? Yes   Is the patient having any side effects they believe may be caused by any medication additions or changes? No   Does the patient have all medications ordered at discharge? Yes   Is the patient taking all medications as directed (includes completed medication regime)? Yes   Medication comments PCP added HCTZ   Does the patient have a primary care provider?  Yes   Does the patient have an appointment with their PCP within 7 days of discharge? Yes   Has the patient kept scheduled appointments due by today? Yes   Has home health visited the patient within 72 hours of discharge? N/A   Psychosocial issues? No   Did the patient receive a copy of their discharge instructions? Yes   Nursing interventions Reviewed instructions with patient   What is the patient's perception of their health status since discharge? Improving  [still has slightly elevated BP and PCP is working on med adjustments]   Is the patient/caregiver able to teach back signs and symptoms related to disease process for when to call PCP? Yes   Is the patient/caregiver able to teach back signs and symptoms related to disease process for when to call 911? Yes   Is the patient/caregiver able to teach back the hierarchy of who to call/visit for symptoms/problems? PCP, Specialist, Home health nurse, Urgent Care, ED, 911 Yes   Week 1 call completed? Yes   Graduated Yes   Graduated/Revoked comments Pt has seen PCP x 2 and has an appt with neuro in July. No needs. Takinf meds as ordered.   Call end time 0832            KATHY RAY - Registered Nurse

## 2025-06-30 ENCOUNTER — TELEPHONE (OUTPATIENT)
Dept: NEUROLOGY | Facility: CLINIC | Age: 50
End: 2025-06-30

## 2025-06-30 ENCOUNTER — OFFICE VISIT (OUTPATIENT)
Dept: NEUROLOGY | Facility: CLINIC | Age: 50
End: 2025-06-30
Payer: COMMERCIAL

## 2025-06-30 VITALS
WEIGHT: 284 LBS | HEIGHT: 66 IN | SYSTOLIC BLOOD PRESSURE: 130 MMHG | HEART RATE: 130 BPM | DIASTOLIC BLOOD PRESSURE: 62 MMHG | OXYGEN SATURATION: 98 % | BODY MASS INDEX: 45.64 KG/M2

## 2025-06-30 DIAGNOSIS — R44.1 VISUAL HALLUCINATIONS: ICD-10-CM

## 2025-06-30 DIAGNOSIS — F41.9 ANXIETY: ICD-10-CM

## 2025-06-30 DIAGNOSIS — Z86.73 HISTORY OF STROKE: Primary | ICD-10-CM

## 2025-06-30 DIAGNOSIS — G47.00 INSOMNIA, UNSPECIFIED TYPE: ICD-10-CM

## 2025-06-30 PROBLEM — C44.91 BASAL CELL CARCINOMA (BCC): Status: ACTIVE | Noted: 2025-06-30

## 2025-06-30 RX ORDER — ATORVASTATIN CALCIUM 40 MG/1
40 TABLET, FILM COATED ORAL DAILY
Qty: 30 TABLET | Refills: 11 | Status: SHIPPED | OUTPATIENT
Start: 2025-06-30 | End: 2026-06-30

## 2025-06-30 RX ORDER — CHLORTHALIDONE 25 MG/1
1 TABLET ORAL DAILY
COMMUNITY
Start: 2025-04-10

## 2025-06-30 RX ORDER — ASPIRIN 81 MG/1
81 TABLET ORAL DAILY
Qty: 30 TABLET | Refills: 0 | Status: SHIPPED | OUTPATIENT
Start: 2025-06-30

## 2025-06-30 RX ORDER — SERTRALINE HYDROCHLORIDE 25 MG/1
25 TABLET, FILM COATED ORAL DAILY
Qty: 30 TABLET | Refills: 2 | Status: SHIPPED | OUTPATIENT
Start: 2025-06-30 | End: 2026-06-30

## 2025-06-30 RX ORDER — QUETIAPINE FUMARATE 25 MG/1
25 TABLET, FILM COATED ORAL NIGHTLY
Qty: 30 TABLET | Refills: 6 | Status: SHIPPED | OUTPATIENT
Start: 2025-06-30

## 2025-06-30 RX ORDER — FOLIC ACID 1 MG/1
1 TABLET ORAL DAILY
COMMUNITY
Start: 2025-04-03

## 2025-06-30 NOTE — PROGRESS NOTES
Neuro Office Visit      Encounter Date: 2025   Patient Name: Barbara Phillips  : 1975   MRN: [id number]   PCP:  Matilda Almendarez MD     Chief Complaint:    Chief Complaint   Patient presents with    Stroke       History of Present Illness: Barbara Phillips is a 49 y.o. female who is here today in Neurology for stroke.    Hospitalized at [facility] from  - 2025 with vomiting, shaking, leg pain.  4 days prior to admission developed visual hallucinations in which she was seeing colors, people that were not there, and bugs crawling on her hands.    MRI of the brain with and without contrast on 2025 showed a diffusion restriction in the left frontotemporal parietal white matter which appears to be a stroke.  New    During hospitalization concern was given to the possibility of a demyelinating disease such as multiple sclerosis.  She underwent lumbar puncture which showed 0 oligoclonal bands.  Dr. Ortiz and I reviewed MRI of the brain and he does not feel that this is MS.    Accompanied by family members who assist with history.  History of Present Illness  She experienced a panic attack last night, which was accompanied by vomiting.     There is a fear of having another stroke if vomiting occurs again.     She reports a sensation of her heart beating irregularly and feels as though it is about to burst out of her chest.     Additionally, her face has turned red, which is unusual for her.     Memory issues are present, particularly with texting, where words often appear jumbled.     Significant weakness on the right side and changes in her walking pattern have been noted.     She has been undergoing physical therapy and can lift 15 pounds with her left arm and 5 pounds with her right.     Physical therapy is attended 3 days a week, with significant improvement observed.     She used to be unable to walk prior to starting physical therapy.     Post-therapy, she tries to get into  the pool to walk around in the water.     Muscle pain and arm discomfort are reported, which she attributes to Lipitor.     Reports a history of seizures and a past suspicion of leukemia due to abnormal blood work results. She has always been prone to illness. No history of diabetes is reported.    Hallucinations continue, believed to occur during sleep.     A recent episode involved screaming and required physical shaking to wake up.     Hallucinations have intensified, with one recent episode involving a sensation of a hand on her shoulder, followed by recognition of a face and a room full of people, leading to screaming and shaking.     These episodes can also occur during naps.    No medication has been taken for anxiety or depression. She is unsure if she is depressed but acknowledges feeling anxious. Sleep is currently disrupted.    Dizziness is experienced upon standing up.    SOCIAL HISTORY:    Alcohol: No alcohol consumption    Tobacco: Never smoked cigarettes    Recreational Drugs: No drug use    FAMILY HISTORY  - Father: Alzheimer's and dementia  - Mother: Stroke  - Sister: Mini strokes  - Negative for diabetes    MEDICATIONS  CURRENT MEDS:  Hydralazine Every 8 hours as needed  Lipitor  Aspirin  Atorvastatin  CT Angiogram Neck (2025 15:24)  CT Angiogram Head (2025 15:24)  IR LUMBAR PUNCTURE DIAGNOSTIC (2025 14:55)  MRI Cervical Spine With & Without Contrast (2025 00:58)  MRI Brain With & Without Contrast (2025 00:58)  MRI Brain Without Contrast (2025 21:12)    Subjective      Past Medical History:   Past Medical History:   Diagnosis Date    Hypertension     Hypothyroidism     Rheumatoid arthritis     Stroke        Past Surgical History:   Past Surgical History:   Procedure Laterality Date    APPENDECTOMY       SECTION      CHOLECYSTECTOMY      TONSILLECTOMY         Family History:   Family History   Problem Relation Age of Onset    Stroke Mother     Alzheimer's  disease Father     Transient ischemic attack Sister     Breast cancer Neg Hx        Social History:   Social History     Socioeconomic History    Marital status: Single   Tobacco Use    Smoking status: Never     Passive exposure: Never    Smokeless tobacco: Never   Vaping Use    Vaping status: Never Used   Substance and Sexual Activity    Alcohol use: No    Drug use: Never    Sexual activity: Yes       Medications:     Current Outpatient Medications:     amLODIPine (NORVASC) 10 MG tablet, Take 1 tablet by mouth Daily., Disp: 30 tablet, Rfl: 0    aspirin 81 MG EC tablet, Take 1 tablet by mouth Daily., Disp: 30 tablet, Rfl: 0    chlorthalidone (HYGROTON) 25 MG tablet, Take 1 tablet by mouth Daily., Disp: , Rfl:     folic acid (FOLVITE) 1 MG tablet, Take 1 tablet by mouth Daily., Disp: , Rfl:     hydrALAZINE (APRESOLINE) 25 MG tablet, Take 1 tablet by mouth Every 8 (Eight) Hours., Disp: 90 tablet, Rfl: 0    levothyroxine (Synthroid) 75 MCG tablet, Take 1 tablet by mouth Every Morning for 30 days., Disp: 30 tablet, Rfl: 2    valsartan (DIOVAN) 320 MG tablet, Take 1 tablet by mouth Daily., Disp: 30 tablet, Rfl: 0    vitamin D (ERGOCALCIFEROL) 1.25 MG (86699 UT) capsule capsule, Take 1 capsule by mouth 1 (One) Time Per Week., Disp: , Rfl:     atorvastatin (Lipitor) 40 MG tablet, Take 1 tablet by mouth Daily., Disp: 30 tablet, Rfl: 11    methotrexate 2.5 MG tablet, Take 1 tablet by mouth 1 (One) Time Per Week. 6 tabs every Friday (Patient not taking: Reported on 6/30/2025), Disp: , Rfl:     QUEtiapine (SEROquel) 25 MG tablet, Take 1 tablet by mouth Every Night., Disp: 30 tablet, Rfl: 6    sertraline (Zoloft) 25 MG tablet, Take 1 tablet by mouth Daily., Disp: 30 tablet, Rfl: 2    Allergies:   Allergies   Allergen Reactions    Morphine And Codeine        PHQ-9 Total Score:     STEADI Fall Risk Assessment has not been completed.    Objective     Physical Exam:     Neurological Exam  Mental Status  Awake, alert and oriented to  person, place and time. Recent and remote memory are intact. Speech is normal. Language is fluent with no aphasia. Attention and concentration are normal. Fund of knowledge is appropriate for level of education.    Cranial Nerves  CN II: Visual acuity is normal.  CN III, IV, VI: Extraocular movements intact bilaterally. Pupils equal round and reactive to light bilaterally.  CN V: Facial sensation is normal.  CN VII: Full and symmetric facial movement.  CN IX, X: Palate elevates symmetrically  CN XI: Shoulder shrug strength is normal.  CN XII: Tongue midline without atrophy or fasciculations.    Motor  Normal muscle bulk throughout. No fasciculations present. Normal muscle tone. Strength is 5/5 in all four extremities except as noted.                                             Right                     Left  Rhomboids                            3                           Infraspinatus                          3                           Supraspinatus                       3                           Deltoid                                   3                            Biceps                                   3                           Brachioradialis                      3                            Triceps                                  3                            Pronator                                3                            Supinator                              3                             Wrist flexor                            3                            Wrist extensor                       3                            Finger extensor                     3                            Interossei                              3                           Hip abductor                         3                           Hip adductor                         3                            Iliopsoas                               3                            Quadriceps                           3               "              Hamstring                             3                            Gastrocnemius                     3                             Anterior tibialis                      3                            Posterior tibialis                    3                            Peroneal                               3                           Ankle dorsiflexor                   3                           Ankle plantar flexor              3                            Extensor hallucis longus      3                              Sensory  Sensation is intact to light touch, pinprick, vibration and proprioception in all four extremities.    Reflexes                                            Right                      Left  Brachioradialis                    2+                         2+  Biceps                                 2+                         2+  Triceps                                2+                         2+  Finger flex                           2+                         2+  Hamstring                            2+                         2+  Patellar                                2+                         2+  Achilles                                2+                         2+    Coordination    Finger-to-nose, rapid alternating movements and heel-to-shin normal bilaterally without dysmetria.    Gait  Normal casual, toe, heel and tandem gait.        Vital Signs:   Vitals:    06/30/25 0828   BP: 130/62   Pulse: (!) 130   SpO2: 98%   Weight: 129 kg (284 lb)   Height: 167.6 cm (65.98\")     Body mass index is 45.87 kg/m².     Results:   Results  Labs   - Lumbar puncture: No oligoclonal bands    Imaging   - MRI of the brain: White matter lesions indicative of a small stroke   - Echocardiogram: No abnormalities     Imaging:   CT Angiogram Chest  Result Date: 6/13/2025  1.No acute abnormality is identified within the thorax. Specifically, there is no evidence of acute pulmonary embolism. 2.Nonemergent " findings as detailed above. Electronically Signed: Dilshad North MD  6/13/2025 11:20 AM EDT  Workstation ID: [id number]    Adult Transthoracic Echo Complete W/ Cont if Necessary Per Protocol (With Agitated Saline)  Addendum Date: 6/13/2025    Left ventricular ejection fraction appears to be 56 - 60%.   The left atrial cavity is mildly dilated.   Trace mitral regurgitation.   Saline test results are negative.     CT Angiogram Head  Result Date: 6/11/2025  1.No acute abnormality is identified within the large arteries of the head or neck. 2.No significant stenosis of the bilateral internal carotid arteries. Electronically Signed: Dilshad North MD  6/11/2025 4:30 PM EDT  Workstation ID: [id number]    CT Angiogram Neck  Result Date: 6/11/2025  1.No acute abnormality is identified within the large arteries of the head or neck. 2.No significant stenosis of the bilateral internal carotid arteries. Electronically Signed: Dilshad North MD  6/11/2025 4:30 PM EDT  Workstation ID: [id number]    IR LUMBAR PUNCTURE DIAGNOSTIC  Result Date: 6/11/2025  Impression: Technically successful fluoroscopic-guided lumbar puncture, as above. Electronically Signed: Deep Tiwari MD  6/11/2025 3:59 PM EDT  Workstation ID: [id number]    MRI Cervical Spine With & Without Contrast  Result Date: 6/11/2025  Impression: 1.No evidence of demyelinating disease in the cervical spine. 2.Mild left-sided facet arthritis. Electronically Signed: Tutu Phillips MD  6/11/2025 3:38 AM EDT  Workstation ID: [id number]    MRI Brain With & Without Contrast  Result Date: 6/11/2025  Impression: 1.Small focus of relative diffusion restriction in the left frontoparietal white matter with subtle enhancement. This could relate to a focus of demyelination or subacute lacunar infarct. 2.Numerous scattered FLAIR hyperintensities in the cerebral white matter. This could be from demyelinating disease or chronic small vessel ischemic change. 3.Severe left and mild right  maxillary sinus mucosal disease. Electronically Signed: Tutu Phillips MD  6/11/2025 3:21 AM EDT  Workstation ID: [id number]    MRI Brain Without Contrast  Result Date: 6/9/2025  Impression: White matter changes likely demyelinating disease with differential considerations including sequela prior inflammation or infection, vasculitis, or chronic small vessel/microangiopathic ischemic changes Electronically Signed: Kyle Hurt MD  6/9/2025 9:20 PM EDT  Workstation ID: [id number]    CT Angiogram Chest  Result Date: 6/9/2025  1.Suboptimal contrast bolus timing limits evaluation of the small peripheral pulmonary arteries. No large central pulmonary embolism is seen. 2.No acute abnormality is identified within the chest, abdomen, or pelvis. 3.Enlarged, fatty liver. 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 5.Probable 5 cm fibroid within the right uterus. 6.Additional findings as detailed above. Electronically Signed: Dilshad North MD  6/9/2025 11:36 AM EDT  Workstation ID: [id number]    CT Abdomen Pelvis With Contrast  Result Date: 6/9/2025  1.Suboptimal contrast bolus timing limits evaluation of the small peripheral pulmonary arteries. No large central pulmonary embolism is seen. 2.No acute abnormality is identified within the chest, abdomen, or pelvis. 3.Enlarged, fatty liver. 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 5.Probable 5 cm fibroid within the right uterus. 6.Additional findings as detailed above. Electronically Signed: Dilshad North MD  6/9/2025 11:36 AM EDT  Workstation ID: [id number]    XR Chest 1 View  Result Date: 6/9/2025  Impression: No acute cardiopulmonary process. Electronically Signed: Dale Combs MD  6/9/2025 9:41 AM EDT  Workstation ID: [id number]    CT Head Without Contrast  Result Date: 6/9/2025  Impression: 1.No acute intracranial process identified. 2.Moderate  "frothy paranasal sinus mucosal disease. Correlate for acute sinusitis. Electronically Signed: Sohail Reyes MD  6/9/2025 9:06 AM EDT  Workstation ID: [id number]       Labs:   No results found for: \"CMP\", \"PROTEIN\", \"ANTIMOGAB\", \"[id number]\", \"JCVRESULT\", \"QUANTTBGOLD\", \"CBCDIF\", \"IGGALBSER\"     Assessment / Plan      Assessment/Plan:   Diagnoses and all orders for this visit:    1. History of stroke (Primary)  Comments:  Continue aspirin  Decrease atorvastatin to 40 mg daily  Continue PT/OT/SLP  Repeat MRI of the brain with and without contrast  Orders:  -     Ambulatory Referral to Occupational Therapy for Evaluation & Treatment  -     Ambulatory Referral to Speech Therapy for Evaluation & Treatment    2. Visual hallucinations  Comments:  Start Seroquel at night    3. Anxiety  Comments:  Start sertraline    4. Insomnia, unspecified type    Other orders  -     atorvastatin (Lipitor) 40 MG tablet; Take 1 tablet by mouth Daily.  Dispense: 30 tablet; Refill: 11  -     sertraline (Zoloft) 25 MG tablet; Take 1 tablet by mouth Daily.  Dispense: 30 tablet; Refill: 2  -     QUEtiapine (SEROquel) 25 MG tablet; Take 1 tablet by mouth Every Night.  Dispense: 30 tablet; Refill: 6  -     aspirin 81 MG EC tablet; Take 1 tablet by mouth Daily.  Dispense: 30 tablet; Refill: 0         Assessment & Plan  1. Stroke.  The patient does not have multiple sclerosis (MS) as confirmed by the absence of oligoclonal bands in the lumbar puncture. The MRI revealed a minor stroke, likely due to elevated blood pressure. There are no significant residual effects from the stroke. The echocardiogram did not indicate a patent foramen ovale (PFO). Memory loss could be attributed to anxiety, depression, and stress. The patient is currently on atorvastatin and aspirin, which will be continued. The dosage of atorvastatin will be reduced to 40 mg to alleviate muscle pain. Refills for aspirin have been provided. Occupational and speech therapy will be " initiated to address word jumbling and other issues.    2. Hallucinations.  The hallucinations may be a result of sleep deprivation and stress. A prescription for Seroquel 25 mg at bedtime has been provided to manage hallucinations. If there is no improvement after a week, a referral to psychiatry will be considered.    3. Anxiety.  The patient will start Zoloft after a week of Seroquel if there are no adverse effects. Zoloft may take 3 to 5 weeks to show full effect.      Follow-up  A follow-up video visit is scheduled in 2 weeks.    Patient Education:     Reviewed medications, potential side effects and signs and symptoms to report. Discussed risk versus benefits of treatment plan with patient and/or family-including medications, labs and radiology that may be ordered. Addressed questions and concerns during visit. Patient and/or family verbalized understanding and agree with plan. Instructed to call the office with any questions and report to ER with any life-threatening symptoms.     Follow Up:   Return in about 2 weeks (around 7/14/2025).    I spent 60 minutes caring for Barbara on this date of service. This time includes time spent by me in the following activities: preparing for the visit, reviewing tests, performing a medically appropriate examination and/or evaluation, counseling and educating the patient/family/caregiver, documenting information in the medical record, independently interpreting results and communicating that information with the patient/family/caregiver, ordering medications, and ordering test(s).        During this visit the following were done:  Labs Reviewed [x]    Labs Ordered []    Radiology Reports Reviewed [x]    Radiology Ordered [x]    PCP Records Reviewed []    Referring Provider Records Reviewed []    ER Records Reviewed []    Hospital Records Reviewed [x]    History Obtained From Family [x]    Radiology Images Reviewed [x]    Other Reviewed []    Records Requested []      Patient  or patient representative verbalized consent for the use of Ambient Listening during the visit with  MINDI Burris for chart documentation. 6/30/2025  16:56 EDT    MINDI Burris   [facility] [place] [facility]  [address] [place] [id number] [phone]

## 2025-06-30 NOTE — TELEPHONE ENCOUNTER
Provider: JARVIS CAMERON    Caller: JANNIE WITH [facility]    Phone Number: [phone]    Reason for Call: STATES THEY GOT REFERRALS FOR SPEECH & OCCUPATIONAL THERAPY WHICH THEY DO NOT PROVIDE. WANTED TO SEE IF PATIENT NEEDS PHYSICAL THERAPY. PLEASE ADVISE, THANK YOU.

## 2025-07-01 DIAGNOSIS — Z86.73 HISTORY OF STROKE: Primary | ICD-10-CM

## 2025-07-03 ENCOUNTER — TELEPHONE (OUTPATIENT)
Dept: NEUROLOGY | Facility: CLINIC | Age: 50
End: 2025-07-03

## 2025-07-03 DIAGNOSIS — Z86.73 HISTORY OF STROKE: Primary | ICD-10-CM

## 2025-07-03 NOTE — TELEPHONE ENCOUNTER
Provider: RAKESH    Caller: Barbara Phillips    Relationship to Patient: Self    Phone Number: [phone]    Reason for Call: PATIENT CALLING TO ADVISE, QUEtiapine (SEROquel) 25 MG tablet IS NOT WORKING AT ALL.  SHE SAYS SHE IS SLEEPING MAYBE 2 HOURS.    PLEASE ADVISE PATIENT    ALSO, PATIENT SAYS THAT HER CURRENT PT OFFICE CAN WORK WITH HER ON SPEECH AND OCC IF A REFERRAL IS SENT TO THEM:    [facility]  [place], [place]  PH: [phone]    THANK YOU

## 2025-07-03 NOTE — TELEPHONE ENCOUNTER
Have her increase Seroquel to 2 tablets at night and see if that will help.  If she is not sleeping well after 3 days on the increased dose have her increase to 4 tablets at night.  I will place the order for speech and occupational therapy to [facility].

## 2025-07-14 RX ORDER — ASPIRIN 81 MG/1
81 TABLET ORAL DAILY
Qty: 30 TABLET | Refills: 6 | Status: SHIPPED | OUTPATIENT
Start: 2025-07-14

## 2025-07-14 NOTE — TELEPHONE ENCOUNTER
Rx Refill Note  Requested Prescriptions     Pending Prescriptions Disp Refills    aspirin 81 MG EC tablet [Pharmacy Med Name: ASPIRIN 81MG EC LOW DOSE D/F TABS] 30 tablet 0     Sig: TAKE 1 TABLET BY MOUTH EVERY DAY      Last filled: 6/30/25, 30 with 0 refills  Last office visit with prescribing clinician: 6/30/2025      Next office visit with prescribing clinician: 7/16/2025     Naheed Mendiola MA  07/14/25, 10:32 EDT

## 2025-07-16 ENCOUNTER — TELEMEDICINE (OUTPATIENT)
Dept: NEUROLOGY | Facility: CLINIC | Age: 50
End: 2025-07-16
Payer: COMMERCIAL

## 2025-07-16 ENCOUNTER — TELEPHONE (OUTPATIENT)
Dept: NEUROLOGY | Facility: CLINIC | Age: 50
End: 2025-07-16

## 2025-07-16 DIAGNOSIS — G47.00 INSOMNIA, UNSPECIFIED TYPE: ICD-10-CM

## 2025-07-16 DIAGNOSIS — F41.9 ANXIETY: ICD-10-CM

## 2025-07-16 DIAGNOSIS — R44.1 VISUAL HALLUCINATIONS: ICD-10-CM

## 2025-07-16 DIAGNOSIS — Z86.73 HISTORY OF STROKE: Primary | ICD-10-CM

## 2025-07-16 RX ORDER — QUETIAPINE FUMARATE 100 MG/1
100 TABLET, FILM COATED ORAL NIGHTLY
Qty: 30 TABLET | Refills: 6 | Status: SHIPPED | OUTPATIENT
Start: 2025-07-16

## 2025-07-16 RX ORDER — LEVOTHYROXINE SODIUM 125 UG/1
1 TABLET ORAL DAILY
COMMUNITY
Start: 2025-07-05

## 2025-07-16 RX ORDER — ROSUVASTATIN CALCIUM 10 MG/1
10 TABLET, COATED ORAL NIGHTLY
Qty: 30 TABLET | Refills: 11 | Status: SHIPPED | OUTPATIENT
Start: 2025-07-16 | End: 2026-07-16

## 2025-07-16 RX ORDER — UBIDECARENONE 30 MG
100 CAPSULE ORAL DAILY
COMMUNITY
Start: 2025-07-16

## 2025-07-16 RX ORDER — SEMAGLUTIDE 0.25 MG/.5ML
0.25 INJECTION, SOLUTION SUBCUTANEOUS WEEKLY
COMMUNITY
Start: 2025-07-15

## 2025-07-16 NOTE — PROGRESS NOTES
Neuro Office Visit      Encounter Date: 2025   Patient Name: Barbara hPillips  : 1975   MRN: [id number]   PCP:  Matilda Almendarez MD     Chief Complaint:    Chief Complaint   Patient presents with    History of stroke       History of Present Illness: Barbara Phillips is a 50 y.o. female who is here today in Neurology for stroke follow-up.    Last visit with me on 2025, referral to PT/OT/SLP, repeat MRI of the brain, continue aspirin, decrease atorvastatin due to muscle aching, start Seroquel and Zoloft.  History of Present Illness  She has been experiencing headaches this week and is currently using hydrocodone as needed for pain management, initially prescribed for her knee condition.     Seroquel 25 mg did not alleviate her symptoms, so we increased the dose to 4 tablets nightly, which provided about 3 hours of sleep. However, it effectively managed her hallucinations.     She has been off Seroquel for a few days and has experienced some minor hallucinations, but they are not as severe as before.     She is considering increasing the Seroquel dose to 100 mg to see if it improves her sleep duration.    Her anxiety is improving, and she believes the sertraline is helping. She does not feel the need for counseling at this time.    She is starting occupational therapy tomorrow and plans to resume physical therapy in two days.     She reports feeling fatigued but notes an improvement in her weakness.     Her balance has improved, and she is able to walk without difficulty.     She is considering returning to work part-time for a few weeks.    She has discontinued Celebrex as it was not providing significant relief and is managing her flare-ups with methotrexate and dietary changes, including reducing red meat intake.    She has noticed significant muscle aches on atorvastatin 40 mg.  She would like to see if we could change to Crestor.      Subjective      Past Medical History:   Past Medical  History:   Diagnosis Date    Hypertension     Hypothyroidism     Rheumatoid arthritis     Stroke        Past Surgical History:   Past Surgical History:   Procedure Laterality Date    APPENDECTOMY       SECTION      CHOLECYSTECTOMY      TONSILLECTOMY         Family History:   Family History   Problem Relation Age of Onset    Stroke Mother     Alzheimer's disease Father     Transient ischemic attack Sister     Breast cancer Neg Hx        Social History:   Social History     Socioeconomic History    Marital status: Single   Tobacco Use    Smoking status: Never     Passive exposure: Never    Smokeless tobacco: Never   Vaping Use    Vaping status: Never Used   Substance and Sexual Activity    Alcohol use: No    Drug use: Never    Sexual activity: Yes       Medications:     Current Outpatient Medications:     amLODIPine (NORVASC) 10 MG tablet, Take 1 tablet by mouth Daily., Disp: 30 tablet, Rfl: 0    aspirin 81 MG EC tablet, TAKE 1 TABLET BY MOUTH EVERY DAY, Disp: 30 tablet, Rfl: 6    Coenzyme Q-10 100 MG capsule, Take 1 capsule by mouth Daily., Disp: , Rfl:     folic acid (FOLVITE) 1 MG tablet, Take 1 tablet by mouth Daily., Disp: , Rfl:     hydrALAZINE (APRESOLINE) 25 MG tablet, Take 1 tablet by mouth Every 8 (Eight) Hours., Disp: 90 tablet, Rfl: 0    levothyroxine (SYNTHROID, LEVOTHROID) 125 MCG tablet, Take 1 tablet by mouth Daily., Disp: , Rfl:     methotrexate 2.5 MG tablet, Take 1 tablet by mouth 1 (One) Time Per Week. 6 tabs every Friday, Disp: , Rfl:     sertraline (Zoloft) 25 MG tablet, Take 1 tablet by mouth Daily., Disp: 30 tablet, Rfl: 2    tiZANidine (ZANAFLEX) 4 MG tablet, Take 1 tablet by mouth Every 12 (Twelve) Hours., Disp: , Rfl:     valsartan (DIOVAN) 320 MG tablet, Take 1 tablet by mouth Daily., Disp: 30 tablet, Rfl: 0    vitamin D (ERGOCALCIFEROL) 1.25 MG (24270 UT) capsule capsule, Take 1 capsule by mouth 1 (One) Time Per Week., Disp: , Rfl:     Wegovy 0.25 MG/0.5ML solution auto-injector,  Inject 0.5 mL under the skin into the appropriate area as directed 1 (One) Time Per Week., Disp: , Rfl:     chlorthalidone (HYGROTON) 25 MG tablet, Take 1 tablet by mouth Daily. (Patient not taking: Reported on 7/16/2025), Disp: , Rfl:     levothyroxine (Synthroid) 75 MCG tablet, Take 1 tablet by mouth Every Morning for 30 days., Disp: 30 tablet, Rfl: 2    QUEtiapine (SEROquel) 100 MG tablet, Take 1 tablet by mouth Every Night., Disp: 30 tablet, Rfl: 6    rosuvastatin (Crestor) 10 MG tablet, Take 1 tablet by mouth Every Night., Disp: 30 tablet, Rfl: 11    Allergies:   Allergies   Allergen Reactions    Morphine And Codeine        PHQ-9 Total Score:     STEADI Fall Risk Assessment has not been completed.    Objective     Physical Exam:     Neurological Exam  Mental Status  Awake, alert and oriented to person, place and time.        Vital Signs: There were no vitals filed for this visit.  There is no height or weight on file to calculate BMI.     Results:   Results       Imaging:   CT Angiogram Chest  Result Date: 6/13/2025  1.No acute abnormality is identified within the thorax. Specifically, there is no evidence of acute pulmonary embolism. 2.Nonemergent findings as detailed above. Electronically Signed: Dilshad North MD  6/13/2025 11:20 AM EDT  Workstation ID: [id number]    Adult Transthoracic Echo Complete W/ Cont if Necessary Per Protocol (With Agitated Saline)  Addendum Date: 6/13/2025    Left ventricular ejection fraction appears to be 56 - 60%.   The left atrial cavity is mildly dilated.   Trace mitral regurgitation.   Saline test results are negative.     CT Angiogram Head  Result Date: 6/11/2025  1.No acute abnormality is identified within the large arteries of the head or neck. 2.No significant stenosis of the bilateral internal carotid arteries. Electronically Signed: Dilshad North MD  6/11/2025 4:30 PM EDT  Workstation ID: [id number]    CT Angiogram Neck  Result Date: 6/11/2025  1.No acute abnormality is  identified within the large arteries of the head or neck. 2.No significant stenosis of the bilateral internal carotid arteries. Electronically Signed: Dilshad North MD  6/11/2025 4:30 PM EDT  Workstation ID: [id number]    IR LUMBAR PUNCTURE DIAGNOSTIC  Result Date: 6/11/2025  Impression: Technically successful fluoroscopic-guided lumbar puncture, as above. Electronically Signed: Deep Tiwari MD  6/11/2025 3:59 PM EDT  Workstation ID: [id number]    MRI Cervical Spine With & Without Contrast  Result Date: 6/11/2025  Impression: 1.No evidence of demyelinating disease in the cervical spine. 2.Mild left-sided facet arthritis. Electronically Signed: Tutu Phillips MD  6/11/2025 3:38 AM EDT  Workstation ID: [id number]    MRI Brain With & Without Contrast  Result Date: 6/11/2025  Impression: 1.Small focus of relative diffusion restriction in the left frontoparietal white matter with subtle enhancement. This could relate to a focus of demyelination or subacute lacunar infarct. 2.Numerous scattered FLAIR hyperintensities in the cerebral white matter. This could be from demyelinating disease or chronic small vessel ischemic change. 3.Severe left and mild right maxillary sinus mucosal disease. Electronically Signed: Tutu Phillips MD  6/11/2025 3:21 AM EDT  Workstation ID: [id number]    MRI Brain Without Contrast  Result Date: 6/9/2025  Impression: White matter changes likely demyelinating disease with differential considerations including sequela prior inflammation or infection, vasculitis, or chronic small vessel/microangiopathic ischemic changes Electronically Signed: Kyle Hurt MD  6/9/2025 9:20 PM EDT  Workstation ID: [id number]    CT Angiogram Chest  Result Date: 6/9/2025  1.Suboptimal contrast bolus timing limits evaluation of the small peripheral pulmonary arteries. No large central pulmonary embolism is seen. 2.No acute abnormality is identified within the chest, abdomen, or pelvis. 3.Enlarged, fatty liver. 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 5.Probable 5 cm fibroid within the right uterus. 6.Additional findings as detailed above. Electronically Signed: Dilshad North MD  6/9/2025 11:36 AM EDT  Workstation ID: [id number]    CT Abdomen Pelvis With Contrast  Result Date: 6/9/2025  1.Suboptimal contrast bolus timing limits evaluation of the small peripheral pulmonary arteries. No large central pulmonary embolism is seen. 2.No acute abnormality is identified within the chest, abdomen, or pelvis. 3.Enlarged, fatty liver. 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 5.Probable 5 cm fibroid within the right uterus. 6.Additional findings as detailed above. Electronically Signed: Dilshad North MD  6/9/2025 11:36 AM EDT  Workstation ID: [id number]    XR Chest 1 View  Result Date: 6/9/2025  Impression: No acute cardiopulmonary process. Electronically Signed: Dale Combs MD  6/9/2025 9:41 AM EDT  Workstation ID: [id number]    CT Head Without Contrast  Result Date: 6/9/2025  Impression: 1.No acute intracranial process identified. 2.Moderate frothy paranasal sinus mucosal disease. Correlate for acute sinusitis. Electronically Signed: Sohail Reyes MD  6/9/2025 9:06 AM EDT  Workstation ID: [id number]       Labs:   No results found for: \"CMP\", \"PROTEIN\", \"ANTIMOGAB\", \"YCBDXC0GESU\", \"JCVRESULT\", \"QUANTTBGOLD\", \"CBCDIF\", \"IGGALBSER\"     Assessment / Plan      Assessment/Plan:   Diagnoses and all orders for this visit:    1. History of stroke (Primary)  Comments:  Continue aspirin  Change atorvastatin to Crestor due to muscle aching    2. Visual hallucinations  Comments:  Improved on Seroquel, will continue at increased dose of 100 mg nightly    3. Anxiety  Comments:  Improved on sertraline,  continue current dose    4. Insomnia, unspecified type  Comments:  Slight improvement with Seroquel, continue 100 mg " nightly    Other orders  -     QUEtiapine (SEROquel) 100 MG tablet; Take 1 tablet by mouth Every Night.  Dispense: 30 tablet; Refill: 6  -     rosuvastatin (Crestor) 10 MG tablet; Take 1 tablet by mouth Every Night.  Dispense: 30 tablet; Refill: 11         Assessment & Plan  1. History of stroke.  She will continue with her current regimen of aspirin 81 mg daily. The atorvastatin will be discontinued and replaced with rosuvastatin (Crestor) 10 mg daily. A lipid panel will be repeated in approximately 6 months. She is advised to maintain her physical therapy (PT) and occupational therapy (OT) sessions. If she observes any changes in her urine color or consistency, she should inform us immediately for further evaluation and potential lab work.    2. Hallucinations.  The dosage of Seroquel will be increased to 100 mg nightly. A prescription for a 30-day supply with 6 refills has been sent to her [facility] pharmacy in [facility]. If she experiences any issues, she should contact us via [employer].    3. Anxiety.  She reports that her anxiety is getting better with the current medication regimen. She will continue taking sertraline 25 mg daily. If she feels the need for additional support or an increase in her medication dosage, she will inform us.    4. Medication management.  She is currently taking multiple medications including amlodipine 10 mg daily, hydralazine 25 mg every 8 hours, Synthroid 75 mcg every morning, methotrexate 6 tablets every Friday, sertraline 25 mg daily, valsartan 320 mg once a day, and vitamin D once a week. She will continue with these medications as prescribed.    5. Pain management.  She uses hydrocodone as needed for pain, particularly for her knee. She is considering getting injections to reduce the need for hydrocodone.    6. Weakness.  She reports that her weakness has improved. She will continue with physical therapy and occupational therapy to further improve her strength and balance.    7.  Celebrex discontinuation.  She has discontinued Celebrex as it was not providing significant relief and is managing her flare-ups with methotrexate and dietary changes, including reducing red meat intake.    Follow-up  A follow-up appointment will be scheduled in 1 to 1.5 months.    Patient Education:     Reviewed medications, potential side effects and signs and symptoms to report. Discussed risk versus benefits of treatment plan with patient and/or family-including medications, labs and radiology that may be ordered. Addressed questions and concerns during visit. Patient and/or family verbalized understanding and agree with plan. Instructed to call the office with any questions and report to ER with any life-threatening symptoms.     Follow Up:   No follow-ups on file.    I spent 40 minutes caring for Barbara on this date of service. This time includes time spent by me in the following activities: preparing for the visit, performing a medically appropriate examination and/or evaluation, counseling and educating the patient/family/caregiver, documenting information in the medical record, and ordering medications.        During this visit the following were done:  Labs Reviewed []    Labs Ordered []    Radiology Reports Reviewed []    Radiology Ordered []    PCP Records Reviewed []    Referring Provider Records Reviewed []    ER Records Reviewed []    Hospital Records Reviewed []    History Obtained From Family []    Radiology Images Reviewed []    Other Reviewed []    Records Requested []      Patient or patient representative verbalized consent for the use of Ambient Listening during the visit with  MINDI Burris for chart documentation. 7/16/2025  16:13 EDT    MINDI Burris   [facility]  [address]  [place] [address]  [phone]

## 2025-07-16 NOTE — TELEPHONE ENCOUNTER
Let the Pt know we had them down for an in person visit.  Appt was moved to same day 1:30 spot and changed to a Video visit.    Pt verbalized understanding and ok the change.

## 2025-08-13 RX ORDER — SERTRALINE HYDROCHLORIDE 25 MG/1
50 TABLET, FILM COATED ORAL DAILY
Qty: 180 TABLET | Refills: 1 | Status: SHIPPED | OUTPATIENT
Start: 2025-08-13 | End: 2026-08-13